# Patient Record
Sex: MALE | Race: WHITE | NOT HISPANIC OR LATINO | Employment: OTHER | URBAN - METROPOLITAN AREA
[De-identification: names, ages, dates, MRNs, and addresses within clinical notes are randomized per-mention and may not be internally consistent; named-entity substitution may affect disease eponyms.]

---

## 2017-09-25 RX ORDER — LOSARTAN POTASSIUM 50 MG/1
TABLET ORAL
Qty: 90 TABLET | Refills: 3 | Status: SHIPPED | OUTPATIENT
Start: 2017-09-25 | End: 2018-10-04 | Stop reason: SDUPTHER

## 2018-07-03 ENCOUNTER — TELEPHONE (OUTPATIENT)
Dept: INTERNAL MEDICINE | Facility: CLINIC | Age: 48
End: 2018-07-03

## 2018-07-03 NOTE — TELEPHONE ENCOUNTER
----- Message from Marianne Cardoza sent at 7/2/2018 11:06 AM CDT -----  Contact: self/[phone]  Pt would like to be reestablished with the doctor, the pt has not been seen by the doctor since 09/03/2015. Pt would like to speak with the nurse in regards to this matter.

## 2018-07-03 NOTE — TELEPHONE ENCOUNTER
----- Message from Asiya Anderson sent at 7/3/2018  3:47 PM CDT -----  Contact: Patient [phone]  Type: Orders Request    What orders/ testing are being requested? EPP    Is there a future appointment scheduled for the patient with PCP? Yes    When? 7/26/18

## 2018-07-03 NOTE — TELEPHONE ENCOUNTER
----- Message from Marianne Cardoza sent at 7/2/2018 11:06 AM CDT -----  Contact: self/[phone]  Pt would like to be reestablished with the doctor, the pt has not been seen by the doctor since 09/03/2015. Pt would like to speak with the nurse in regards to this matter.

## 2018-07-26 ENCOUNTER — OFFICE VISIT (OUTPATIENT)
Dept: INTERNAL MEDICINE | Facility: CLINIC | Age: 48
End: 2018-07-26
Payer: COMMERCIAL

## 2018-07-26 ENCOUNTER — HOSPITAL ENCOUNTER (OUTPATIENT)
Dept: RADIOLOGY | Facility: HOSPITAL | Age: 48
Discharge: HOME | End: 2018-07-26
Attending: INTERNAL MEDICINE
Payer: COMMERCIAL

## 2018-07-26 VITALS
OXYGEN SATURATION: 98 % | SYSTOLIC BLOOD PRESSURE: 126 MMHG | BODY MASS INDEX: 34.03 KG/M2 | DIASTOLIC BLOOD PRESSURE: 80 MMHG | HEIGHT: 74 IN | HEART RATE: 87 BPM | WEIGHT: 265.19 LBS

## 2018-07-26 DIAGNOSIS — D69.6 DECREASED PLATELET COUNT: ICD-10-CM

## 2018-07-26 DIAGNOSIS — I10 ESSENTIAL HYPERTENSION: ICD-10-CM

## 2018-07-26 DIAGNOSIS — Z00.00 PHYSICAL EXAM: Primary | ICD-10-CM

## 2018-07-26 DIAGNOSIS — E78.00 PURE HYPERCHOLESTEROLEMIA: ICD-10-CM

## 2018-07-26 PROCEDURE — 99999 PR PBB SHADOW E&M-EST. PATIENT-LVL IV: CPT | Mod: PBBFAC,,, | Performed by: INTERNAL MEDICINE

## 2018-07-26 PROCEDURE — 3079F DIAST BP 80-89 MM HG: CPT | Mod: CPTII,S$GLB,, | Performed by: INTERNAL MEDICINE

## 2018-07-26 PROCEDURE — 71046 X-RAY EXAM CHEST 2 VIEWS: CPT | Mod: TC

## 2018-07-26 PROCEDURE — 99396 PREV VISIT EST AGE 40-64: CPT | Mod: S$GLB,,, | Performed by: INTERNAL MEDICINE

## 2018-07-26 PROCEDURE — 71046 X-RAY EXAM CHEST 2 VIEWS: CPT | Mod: 26,,, | Performed by: RADIOLOGY

## 2018-07-26 PROCEDURE — 3074F SYST BP LT 130 MM HG: CPT | Mod: CPTII,S$GLB,, | Performed by: INTERNAL MEDICINE

## 2018-07-26 RX ORDER — CELECOXIB 200 MG/1
200 CAPSULE ORAL 2 TIMES DAILY
Status: ON HOLD | COMMUNITY
End: 2020-12-11 | Stop reason: HOSPADM

## 2018-07-26 NOTE — PROGRESS NOTES
"Subjective:      Patient ID: Jim Brown is a 48 y.o. male.    Chief Complaint: Annual Exam    HPI:  HPI   Patient is here for an annual exam: he feels well, he had an episode of fluid on the knee and had a left shoulder problem ( adhesive capsulitis). Dr. Chavez felt he may have had gout.    Rash on lower leg: dermatitis ( prednisone and ketoconazole) ( April to July) Over the body  Bone Spur and on celebrex 200 mg daily    Annual exam: 7/26/2018  Colonoscopy: Father with polyps and Paternal Grandmother with colon cancer.  Optho: Dr. Tatum  Flu: due in the fall  Tetanus: 2014  Shingles:not until age 49  Pneumovax  Prevnar:      Patient Active Problem List   Diagnosis    Hypertension    Foot pain: Dr. Simon    Hyperlipidemia    Decreased platelet count    Screening for colon cancer    BMI 34.0-34.9,adult     No past medical history on file.  Past Surgical History:   Procedure Laterality Date    COLONOSCOPY  8/10/15    TONSILLECTOMY       Family History   Problem Relation Age of Onset    Cancer Paternal Grandmother         colon cancer     Review of Systems   Constitutional: Negative for appetite change, chills, fever and unexpected weight change.   Respiratory: Negative for shortness of breath and wheezing.    Cardiovascular: Negative for chest pain, palpitations and leg swelling.   Gastrointestinal: Negative for abdominal pain, blood in stool, diarrhea, nausea and vomiting.     Objective:     Vitals:    07/26/18 1431   BP: 126/80   Pulse: 87   SpO2: 98%   Weight: 120.3 kg (265 lb 3.4 oz)   Height: 6' 2" (1.88 m)   PainSc: 0-No pain     Body mass index is 34.05 kg/m².  Physical Exam   Constitutional: He is oriented to person, place, and time. He appears well-developed and well-nourished. No distress.   Neck: Carotid bruit is not present. No thyromegaly present.   Cardiovascular: Normal rate, regular rhythm and normal heart sounds.  PMI is not displaced.    Pulmonary/Chest: Effort normal and " breath sounds normal. No respiratory distress.   Abdominal: Soft. Bowel sounds are normal. He exhibits no distension. There is no tenderness.   Musculoskeletal: He exhibits no edema.   Neurological: He is alert and oriented to person, place, and time.     Assessment:     1. Physical exam    2. Essential hypertension    3. Decreased platelet count    4. Pure hypercholesterolemia    5. BMI 34.0-34.9,adult      Plan:   Jim was seen today for annual exam.    Diagnoses and all orders for this visit:    Physical exam: updated and reviewed    Essential hypertension: well controlled on losartan 50  -     CBC auto differential; Future  -     Comprehensive metabolic panel; Future  -     Lipid panel; Future  -     TSH; Future  -     Hemoglobin A1c; Future  -     X-Ray Chest PA And Lateral; Future  -     EKG 12-lead; Future  -     Urinalysis  -     CT Cardiac Scoring; Future    Decreased platelet count: monitor yearly    Pure hypercholesterolemia: monitor    BMI 34.0-34.9,adult: for good health must lost weight      Follow-up in about 1 year (around 7/26/2019) for Annual exam.     Medication List          Accurate as of 7/26/18  3:10 PM. If you have any questions, ask your nurse or doctor.               CONTINUE taking these medications    celecoxib 200 MG capsule  Commonly known as:  CeleBREX     losartan 50 MG tablet  Commonly known as:  COZAAR  TAKE 1 TABLET BY MOUTH EVERY DAY        STOP taking these medications    meloxicam 7.5 MG tablet  Commonly known as:  MOBIC  Stopped by:  Cynthia Myers MD     triamcinolone 55 mcg nasal inhaler  Commonly known as:  NASACORT AQ  Stopped by:  Cynthia Myers MD

## 2018-08-14 ENCOUNTER — HOSPITAL ENCOUNTER (OUTPATIENT)
Dept: RADIOLOGY | Facility: HOSPITAL | Age: 48
Discharge: HOME | End: 2018-08-14
Attending: INTERNAL MEDICINE
Payer: COMMERCIAL

## 2018-08-14 ENCOUNTER — HOSPITAL ENCOUNTER (OUTPATIENT)
Dept: CARDIOLOGY | Facility: CLINIC | Age: 48
Discharge: HOME | End: 2018-08-14
Payer: COMMERCIAL

## 2018-08-14 DIAGNOSIS — I10 ESSENTIAL HYPERTENSION: ICD-10-CM

## 2018-08-14 PROCEDURE — 93000 ELECTROCARDIOGRAM COMPLETE: CPT | Mod: S$GLB,,, | Performed by: INTERNAL MEDICINE

## 2018-08-14 PROCEDURE — 75571 CT HRT W/O DYE W/CA TEST: CPT | Mod: 26,,, | Performed by: RADIOLOGY

## 2018-08-14 PROCEDURE — 75571 CT HRT W/O DYE W/CA TEST: CPT | Mod: TC

## 2018-10-04 RX ORDER — LOSARTAN POTASSIUM 50 MG/1
TABLET ORAL
Qty: 90 TABLET | Refills: 3 | Status: SHIPPED | OUTPATIENT
Start: 2018-10-04 | End: 2019-11-10 | Stop reason: SDUPTHER

## 2019-11-10 RX ORDER — LOSARTAN POTASSIUM 50 MG/1
TABLET ORAL
Qty: 90 TABLET | Refills: 0 | Status: SHIPPED | OUTPATIENT
Start: 2019-11-10 | End: 2020-03-18

## 2020-03-18 RX ORDER — LOSARTAN POTASSIUM 50 MG/1
TABLET ORAL
Qty: 90 TABLET | Refills: 3 | Status: ON HOLD | OUTPATIENT
Start: 2020-03-18 | End: 2020-12-11 | Stop reason: SDUPTHER

## 2020-12-06 ENCOUNTER — NURSE TRIAGE (OUTPATIENT)
Dept: ADMINISTRATIVE | Facility: CLINIC | Age: 50
End: 2020-12-06

## 2020-12-06 ENCOUNTER — PATIENT MESSAGE (OUTPATIENT)
Dept: ADMINISTRATIVE | Facility: OTHER | Age: 50
End: 2020-12-06

## 2020-12-06 ENCOUNTER — OFFICE VISIT (OUTPATIENT)
Dept: URGENT CARE | Facility: CLINIC | Age: 50
End: 2020-12-06
Payer: COMMERCIAL

## 2020-12-06 VITALS
BODY MASS INDEX: 34.01 KG/M2 | HEART RATE: 102 BPM | RESPIRATION RATE: 18 BRPM | TEMPERATURE: 99 F | HEIGHT: 74 IN | SYSTOLIC BLOOD PRESSURE: 143 MMHG | OXYGEN SATURATION: 97 % | WEIGHT: 265 LBS | DIASTOLIC BLOOD PRESSURE: 87 MMHG

## 2020-12-06 DIAGNOSIS — U07.1 COVID-19 VIRUS INFECTION: ICD-10-CM

## 2020-12-06 DIAGNOSIS — R19.7 DIARRHEA, UNSPECIFIED TYPE: ICD-10-CM

## 2020-12-06 DIAGNOSIS — R06.02 SHORTNESS OF BREATH: Primary | ICD-10-CM

## 2020-12-06 DIAGNOSIS — J18.9 PNEUMONIA OF BOTH LUNGS DUE TO INFECTIOUS ORGANISM, UNSPECIFIED PART OF LUNG: ICD-10-CM

## 2020-12-06 DIAGNOSIS — R11.0 NAUSEA: ICD-10-CM

## 2020-12-06 LAB
CTP QC/QA: YES
SARS-COV-2 RDRP RESP QL NAA+PROBE: POSITIVE

## 2020-12-06 PROCEDURE — 99214 PR OFFICE/OUTPT VISIT, EST, LEVL IV, 30-39 MIN: ICD-10-PCS | Mod: 25,S$GLB,, | Performed by: NURSE PRACTITIONER

## 2020-12-06 PROCEDURE — 71046 X-RAY EXAM CHEST 2 VIEWS: CPT | Mod: FY,S$GLB,, | Performed by: RADIOLOGY

## 2020-12-06 PROCEDURE — 71046 XR CHEST PA AND LATERAL: ICD-10-PCS | Mod: FY,S$GLB,, | Performed by: RADIOLOGY

## 2020-12-06 PROCEDURE — 96372 PR INJECTION,THERAP/PROPH/DIAG2ST, IM OR SUBCUT: ICD-10-PCS | Mod: S$GLB,,, | Performed by: NURSE PRACTITIONER

## 2020-12-06 PROCEDURE — 99214 OFFICE O/P EST MOD 30 MIN: CPT | Mod: 25,S$GLB,, | Performed by: NURSE PRACTITIONER

## 2020-12-06 PROCEDURE — 3008F BODY MASS INDEX DOCD: CPT | Mod: CPTII,S$GLB,, | Performed by: NURSE PRACTITIONER

## 2020-12-06 PROCEDURE — U0002 COVID-19 LAB TEST NON-CDC: HCPCS | Mod: QW,S$GLB,, | Performed by: NURSE PRACTITIONER

## 2020-12-06 PROCEDURE — 3008F PR BODY MASS INDEX (BMI) DOCUMENTED: ICD-10-PCS | Mod: CPTII,S$GLB,, | Performed by: NURSE PRACTITIONER

## 2020-12-06 PROCEDURE — 96372 THER/PROPH/DIAG INJ SC/IM: CPT | Mod: S$GLB,,, | Performed by: NURSE PRACTITIONER

## 2020-12-06 PROCEDURE — U0002: ICD-10-PCS | Mod: QW,S$GLB,, | Performed by: NURSE PRACTITIONER

## 2020-12-06 RX ORDER — ALBUTEROL SULFATE 90 UG/1
2 AEROSOL, METERED RESPIRATORY (INHALATION) EVERY 6 HOURS PRN
Qty: 18 G | Refills: 0 | Status: SHIPPED | OUTPATIENT
Start: 2020-12-06 | End: 2022-01-25

## 2020-12-06 RX ORDER — ONDANSETRON 4 MG/1
4 TABLET, FILM COATED ORAL DAILY PRN
Qty: 30 TABLET | Refills: 0 | Status: SHIPPED | OUTPATIENT
Start: 2020-12-06 | End: 2021-12-06

## 2020-12-06 RX ORDER — LIDOCAINE HYDROCHLORIDE 10 MG/ML
1 INJECTION INFILTRATION; PERINEURAL
Status: DISCONTINUED | OUTPATIENT
Start: 2020-12-06 | End: 2020-12-06 | Stop reason: HOSPADM

## 2020-12-06 RX ORDER — DOXYCYCLINE HYCLATE 100 MG
100 TABLET ORAL 2 TIMES DAILY
Qty: 14 TABLET | Refills: 0 | Status: ON HOLD | OUTPATIENT
Start: 2020-12-06 | End: 2020-12-11 | Stop reason: HOSPADM

## 2020-12-06 RX ORDER — CEFTRIAXONE 1 G/1
1 INJECTION, POWDER, FOR SOLUTION INTRAMUSCULAR; INTRAVENOUS
Status: DISCONTINUED | OUTPATIENT
Start: 2020-12-06 | End: 2020-12-06 | Stop reason: HOSPADM

## 2020-12-06 RX ADMIN — CEFTRIAXONE 1 G: 1 INJECTION, POWDER, FOR SOLUTION INTRAMUSCULAR; INTRAVENOUS at 03:12

## 2020-12-06 RX ADMIN — LIDOCAINE HYDROCHLORIDE 1 ML: 10 INJECTION INFILTRATION; PERINEURAL at 04:12

## 2020-12-06 NOTE — PATIENT INSTRUCTIONS
When You Have Pneumonia  You have been diagnosed with pneumonia. This is a serious lung infection. Most cases of pneumonia are caused by bacteria. Pneumonia most often occurs in older adults, young children, and people with chronic health problems.  Home care  · Take your medicine exactly as directed. Dont skip doses. Continue taking your antibiotics as until they are all gone, even if you start to feel better. This will prevent the pneumonia from coming back.  · Drink at least 8 glasses of water daily, unless directed otherwise. This helps to loosen and thin secretions so that you can cough them up.  · Use a cool-mist humidifier in your bedroom. Be sure to clean the humidifier daily.  · Dont use medicines to suppress your cough unless your cough is dry, painful, or interferes with your sleep. Coughing up mucus is normal. You may use an expectorant if your healthcare provider says its okay.  · You can use warm compresses or a heating pad on the lowest setting to relieve chest discomfort. Use several times a day for 15-20 minutes at a time. To prevent injury to your skin, set the temperature to warm, not hot. Dont put the compress or pad directly on your skin. Make certain it has a cover or wrap it in a towel. This is to prevent skin burns.  · Get plenty of rest until your fever, shortness of breath, and chest pain go away.  · Plan to get a flu shot every year. The flu is a common cause of pneumonia. Getting a flu shot every year can help prevent both the flu and pneumonia.  Getting the pneumococcal vaccine  Talk with your healthcare provider about getting the pneumococcalvaccine. Pneumococcal pneumonia is caused by bacteria that spread from person to person. It can cause minor problems, such as ear infections. But it can also turn into life-threatening illnesses of the lungs (pneumonia), the covering of the brain and spinal cord (meningitis), and the blood (bacteremia).  Children under 2 years of age, adults  over age 65, people with certain health conditions, and smokers are at the highest risk of pneumococcal disease. This vaccine can help prevent pneumococcal disease in both adults and children. Some people should not have the vaccine. Make sure to ask your healthcare provider if you should have the vaccine.   Follow-up care  Make a follow-up appointment as directed by our staff.  When to call your healthcare provider  Call your healthcare provider if you have any of the following:  · Fever above 100.4°F (38°C), or as directed by your healthcare provider  · Mucus from the lungs (sputum) thats yellow, green, bloody, or smells bad  · A large amount of sputum  · Vomiting  · Symptoms that get worse  When to call 911  Call 911 right away if you have any of the following:  · Chest pain  · Trouble breathing  · Blue lips or fingernails   Date Last Reviewed: 11/1/2016  © 0405-7097 [employer]. [address] [place] [address]. All rights reserved. This information is not intended as a substitute for professional medical care. Always follow your healthcare professional's instructions.        Your test was POSITIVE for COVID-19 (coronavirus).       Please isolate yourself at home.  You may leave home and/or return to work once the following conditions are met:    If you were not hospitalized and are not severely immunocompromised*:   More than 10 days since symptoms first appeared AND   More than 24 hours fever free without medications AND   Symptoms have improved     If you were hospitalized OR are severely immunocompromised*:   More than 20 days since symptoms first appeared   More than 24 hours fever free without medications   Symptoms have improved    If you had no symptoms but tested positive:   More than 10 days since the date of the first positive test (20 days if severely immunocompromised).   If you develop symptoms, then use the guidelines above.     *Definition of severely  immunocompromised:  - Current chemotherapy for cancer  - Untreated HIV with CD4 count less than 200  - Combined primary immunodeficiency disorder  - Prednisone more than 20 mg per day for more than 14 days  - Post-transplant patients    Additional instructions:   Separate yourself from other people and animals in your home.   Call ahead before visiting your doctor.   Wear a facemask when around others.   Cover your coughs and sneezes.   Wash your hands often with soap and water; hand  can be used, too.   Avoid sharing personal household items.   Wipe down surfaces used daily.   Monitor your symptoms. Seek prompt medical attention if your illness is worsening (e.g., difficulty breathing).    Before seeking care, call your healthcare provider.   If you have a medical emergency and need to call 911, notify the dispatch personnel that you have, or are being evaluated for COVID-19. If possible, put on a facemask before emergency medical services arrive.        Contact Tracing    As one of the next steps, you will receive a call or text from the [place] [facility] ([facility]) COVID-19 Tracing Team. See the contact information below so you know not to ignore the health departments call. It is important that you contact them back immediately so they can help.      Contact Tracer Number:  [phone]  Caller ID for most carriers: [place] Dept [facility]     What is contact tracing?  · Contact tracing is a process that helps identify everyone who has been in close contact with an infected person. Contact tracers let those people know they may have been exposed and guide them on next steps. Confidentiality is important for everyone; no one will be told who may have exposed them to the virus.  · Your involvement is important. The more we know about where and how this virus is spreading, the better chance we have at stopping it from spreading further.  What does exposure mean?  · Exposure means you have been  within 6 feet for more than 15 minutes with a person who has or had COVID-19.  What kind of questions do the contact tracers ask?  · A contact tracer will confirm your basic contact information including name, address, phone number, and next of kin, as well as asking about any symptoms you may have had. Theyll also ask you how you think you may have gotten sick, such as places where you may have been exposed to the virus, and people you were with. Those names will never be shared with anyone outside of that call, and will only be used to help trace and stop the spread of the virus.   I have privacy concerns. How will the state use my information?  · Your privacy about your health is important. All calls are completed using call centers that use the appropriate health privacy protection measures (HIPAA compliance), meaning that your patient information is safe. No one will ever ask you any questions related to immigration status. Your health comes first.   Do I have to participate?  · You do not have to participate, but we strongly encourage you to. Contact tracing can help us catch and control new outbreaks as theyre developing to keep your friends and family safe.   What if I dont hear from anyone?  · If you dont receive a call within 24 hours, you can call the number above right away to inquire about your status. That line is open from 8:00 am - 8:00 p.m., 7 days a week.  Contact tracing saves lives! Together, we have the power to beat this virus and keep our loved ones and neighbors safe.    For more information see CDC link below.      https://www.cdc.gov/coronavirus/2019-ncov/hcp/guidance-prevent-spread.html#precautions        Sources:  [facility], [place] [facility] and \[facility]\""           Sincerely,     Harshad Puckett III, NP

## 2020-12-06 NOTE — PROGRESS NOTES
"Subjective:       Patient ID: Jim Brown is a 50 y.o. male.    Vitals:  height is 6' 2" (1.88 m) and weight is 120.2 kg (265 lb). His oral temperature is 99.2 °F (37.3 °C). His blood pressure is 143/87 (abnormal) and his pulse is 102. His respiration is 18 and oxygen saturation is 97%.     Chief Complaint: URI    This past tested positive for COVID-19.  Patient began having symptoms yesterday and shortness of breath has worsened.  He has taken his pulse ox at home and record results from 89% to 96%.    Cough has been intermittent and dry.    Patient with chills and subjective fever.  No nausea vomiting or diarrhea.  No sudden lack of sense of taste or smell.    URI   This is a new problem. The current episode started in the past 7 days (WEDNESDAY ). The problem has been gradually worsening. The maximum temperature recorded prior to his arrival was 101 - 101.9 F (TODAY ). Associated symptoms include congestion, coughing, diarrhea, headaches, nausea and wheezing. Pertinent negatives include no ear pain, rash, sinus pain, sore throat or vomiting. He has tried acetaminophen (THERAFLU, VITAMIN C, VITAMIN D, AND MULTIIVITAMIN ) for the symptoms. The treatment provided no relief.       Constitution: Positive for chills, sweating, fatigue and fever.   HENT: Positive for congestion. Negative for ear pain, sinus pain, sinus pressure, sore throat and voice change.    Neck: Negative for painful lymph nodes.   Eyes: Negative for eye redness.   Respiratory: Positive for chest tightness, cough, shortness of breath and wheezing. Negative for sputum production, bloody sputum, COPD, stridor and asthma.    Gastrointestinal: Positive for nausea and diarrhea. Negative for vomiting.   Musculoskeletal: Positive for muscle ache.   Skin: Negative for rash.   Allergic/Immunologic: Negative for seasonal allergies and asthma.   Neurological: Positive for headaches.   Hematologic/Lymphatic: Negative for swollen lymph nodes.     "   Objective:      Physical Exam   Constitutional: He is oriented to person, place, and time. He appears well-developed. He is cooperative.  Non-toxic appearance. He appears ill. No distress.   HENT:   Head: Normocephalic and atraumatic.   Ears:   Right Ear: Hearing and external ear normal.   Left Ear: Hearing and external ear normal.   Nose: Nose normal. No mucosal edema, rhinorrhea or nasal deformity. No epistaxis.   Mouth/Throat: Mucous membranes are normal.   Eyes: Conjunctivae and lids are normal. No scleral icterus.   Neck: Trachea normal, full passive range of motion without pain and phonation normal. Neck supple. No neck rigidity. No edema and no erythema present.   Cardiovascular: Normal rate.   Pulmonary/Chest: Effort normal and breath sounds normal. No respiratory distress. He has no decreased breath sounds. He has no wheezes.   Abdominal: Normal appearance.   Musculoskeletal: Normal range of motion.         General: No deformity.   Neurological: He is alert and oriented to person, place, and time. He exhibits normal muscle tone. Coordination normal.   Skin: Skin is warm, dry, intact, not diaphoretic and not pale. Psychiatric: His speech is normal and behavior is normal. Judgment and thought content normal.   Nursing note and vitals reviewed.    X-ray Chest Pa And Lateral    Result Date: 12/6/2020  EXAMINATION: XR CHEST PA AND LATERAL CLINICAL HISTORY: Shortness of breath TECHNIQUE: PA and lateral views of the chest were performed. COMPARISON: 07/26/2018 FINDINGS: Bilateral patchy airspace opacities are seen suspicious for pneumonia.  Follow-up to resolution is recommended.  Cardiomediastinal silhouette and osseous structures are unchanged.     As above Electronically signed by: Santiago Anderson MD Date:    12/06/2020 Time:    15:34    Results for orders placed or performed in visit on 12/06/20   POCT COVID-19 Rapid Screening   Result Value Ref Range    POC Rapid COVID Positive (A) Negative      Acceptable Yes            Assessment:       1. Shortness of breath    2. COVID-19 virus infection    3. Diarrhea, unspecified type    4. Nausea    5. Pneumonia of both lungs due to infectious organism, unspecified part of lung        Plan:       Labs and Xrays ordered at this visit reviewed.     Shortness of breath  -     POCT COVID-19 Rapid Screening  -     X-Ray Chest PA And Lateral; Future; Expected date: 12/06/2020  -     COVID-19 Surveillance Program  -     pulse oximeter (PULSE OXIMETER) device; by Apply Externally route 2 (two) times a day. Use twice daily at 8 AM and 3 PM and record the value in MyChart as directed.  Dispense: 1 each; Refill: 0  -     NURSING COMMUNICATION: Create [facility]  -     albuterol (VENTOLIN HFA) 90 mcg/actuation inhaler; Inhale 2 puffs into the lungs every 6 (six) hours as needed for Wheezing. Rescue  Dispense: 18 g; Refill: 0    COVID-19 virus infection  -     COVID-19 Surveillance Program  -     pulse oximeter (PULSE OXIMETER) device; by Apply Externally route 2 (two) times a day. Use twice daily at 8 AM and 3 PM and record the value in MyChart as directed.  Dispense: 1 each; Refill: 0  -     NURSING COMMUNICATION: Create [facility]  -     albuterol (VENTOLIN HFA) 90 mcg/actuation inhaler; Inhale 2 puffs into the lungs every 6 (six) hours as needed for Wheezing. Rescue  Dispense: 18 g; Refill: 0    Diarrhea, unspecified type  -     X-Ray Chest PA And Lateral; Future; Expected date: 12/06/2020    Nausea  -     ondansetron (ZOFRAN) 4 MG tablet; Take 1 tablet (4 mg total) by mouth daily as needed for Nausea.  Dispense: 30 tablet; Refill: 0    Pneumonia of both lungs due to infectious organism, unspecified part of lung    Other orders  -     cefTRIAXone injection 1 g  -     lidocaine HCL 10 mg/ml (1%) injection 1 mL  -     doxycycline (VIBRA-TABS) 100 MG tablet; Take 1 tablet (100 mg total) by mouth 2 (two) times daily. for 7 days  Dispense: 14 tablet; Refill:  0      Patient Instructions       When You Have Pneumonia  You have been diagnosed with pneumonia. This is a serious lung infection. Most cases of pneumonia are caused by bacteria. Pneumonia most often occurs in older adults, young children, and people with chronic health problems.  Home care  · Take your medicine exactly as directed. Dont skip doses. Continue taking your antibiotics as until they are all gone, even if you start to feel better. This will prevent the pneumonia from coming back.  · Drink at least 8 glasses of water daily, unless directed otherwise. This helps to loosen and thin secretions so that you can cough them up.  · Use a cool-mist humidifier in your bedroom. Be sure to clean the humidifier daily.  · Dont use medicines to suppress your cough unless your cough is dry, painful, or interferes with your sleep. Coughing up mucus is normal. You may use an expectorant if your healthcare provider says its okay.  · You can use warm compresses or a heating pad on the lowest setting to relieve chest discomfort. Use several times a day for 15-20 minutes at a time. To prevent injury to your skin, set the temperature to warm, not hot. Dont put the compress or pad directly on your skin. Make certain it has a cover or wrap it in a towel. This is to prevent skin burns.  · Get plenty of rest until your fever, shortness of breath, and chest pain go away.  · Plan to get a flu shot every year. The flu is a common cause of pneumonia. Getting a flu shot every year can help prevent both the flu and pneumonia.  Getting the pneumococcal vaccine  Talk with your healthcare provider about getting the pneumococcalvaccine. Pneumococcal pneumonia is caused by bacteria that spread from person to person. It can cause minor problems, such as ear infections. But it can also turn into life-threatening illnesses of the lungs (pneumonia), the covering of the brain and spinal cord (meningitis), and the blood  (bacteremia).  Children under 2 years of age, adults over age 65, people with certain health conditions, and smokers are at the highest risk of pneumococcal disease. This vaccine can help prevent pneumococcal disease in both adults and children. Some people should not have the vaccine. Make sure to ask your healthcare provider if you should have the vaccine.   Follow-up care  Make a follow-up appointment as directed by our staff.  When to call your healthcare provider  Call your healthcare provider if you have any of the following:  · Fever above 100.4°F (38°C), or as directed by your healthcare provider  · Mucus from the lungs (sputum) thats yellow, green, bloody, or smells bad  · A large amount of sputum  · Vomiting  · Symptoms that get worse  When to call 911  Call 911 right away if you have any of the following:  · Chest pain  · Trouble breathing  · Blue lips or fingernails   Date Last Reviewed: 11/1/2016 © 2000-2017 [employer] [address] [place] [address]. All rights reserved. This information is not intended as a substitute for professional medical care. Always follow your healthcare professional's instructions.        Your test was POSITIVE for COVID-19 (coronavirus).       Please isolate yourself at home.  You may leave home and/or return to work once the following conditions are met:    If you were not hospitalized and are not severely immunocompromised*:   More than 10 days since symptoms first appeared AND   More than 24 hours fever free without medications AND   Symptoms have improved     If you were hospitalized OR are severely immunocompromised*:   More than 20 days since symptoms first appeared   More than 24 hours fever free without medications   Symptoms have improved    If you had no symptoms but tested positive:   More than 10 days since the date of the first positive test (20 days if severely immunocompromised).   If you develop symptoms, then use the  guidelines above.     *Definition of severely immunocompromised:  - Current chemotherapy for cancer  - Untreated HIV with CD4 count less than 200  - Combined primary immunodeficiency disorder  - Prednisone more than 20 mg per day for more than 14 days  - Post-transplant patients    Additional instructions:   Separate yourself from other people and animals in your home.   Call ahead before visiting your doctor.   Wear a facemask when around others.   Cover your coughs and sneezes.   Wash your hands often with soap and water; hand  can be used, too.   Avoid sharing personal household items.   Wipe down surfaces used daily.   Monitor your symptoms. Seek prompt medical attention if your illness is worsening (e.g., difficulty breathing).    Before seeking care, call your healthcare provider.   If you have a medical emergency and need to call 911, notify the dispatch personnel that you have, or are being evaluated for COVID-19. If possible, put on a facemask before emergency medical services arrive.        Contact Tracing    As one of the next steps, you will receive a call or text from the [place] [facility] ([facility]) COVID-19 Tracing Team. See the contact information below so you know not to ignore the health departments call. It is important that you contact them back immediately so they can help.      Contact Tracer Number:  [phone]  Caller ID for most carriers: [place] Dept [facility]     What is contact tracing?  · Contact tracing is a process that helps identify everyone who has been in close contact with an infected person. Contact tracers let those people know they may have been exposed and guide them on next steps. Confidentiality is important for everyone; no one will be told who may have exposed them to the virus.  · Your involvement is important. The more we know about where and how this virus is spreading, the better chance we have at stopping it from spreading further.  What does  exposure mean?  · Exposure means you have been within 6 feet for more than 15 minutes with a person who has or had COVID-19.  What kind of questions do the contact tracers ask?  · A contact tracer will confirm your basic contact information including name, address, phone number, and next of kin, as well as asking about any symptoms you may have had. Theyll also ask you how you think you may have gotten sick, such as places where you may have been exposed to the virus, and people you were with. Those names will never be shared with anyone outside of that call, and will only be used to help trace and stop the spread of the virus.   I have privacy concerns. How will the state use my information?  · Your privacy about your health is important. All calls are completed using call centers that use the appropriate health privacy protection measures (HIPAA compliance), meaning that your patient information is safe. No one will ever ask you any questions related to immigration status. Your health comes first.   Do I have to participate?  · You do not have to participate, but we strongly encourage you to. Contact tracing can help us catch and control new outbreaks as theyre developing to keep your friends and family safe.   What if I dont hear from anyone?  · If you dont receive a call within 24 hours, you can call the number above right away to inquire about your status. That line is open from 8:00 am - 8:00 p.m., 7 days a week.  Contact tracing saves lives! Together, we have the power to beat this virus and keep our loved ones and neighbors safe.    For more information see CDC link below.      https://www.cdc.gov/coronavirus/2019-ncov/hcp/guidance-prevent-spread.html#precautions        Sources:  [facility], [place] [facility] and [facility]           Sincerely,     Harshad Puckett III, NP

## 2020-12-07 ENCOUNTER — NURSE TRIAGE (OUTPATIENT)
Dept: ADMINISTRATIVE | Facility: CLINIC | Age: 50
End: 2020-12-07

## 2020-12-07 ENCOUNTER — HOSPITAL ENCOUNTER (INPATIENT)
Facility: HOSPITAL | Age: 50
LOS: 4 days | Discharge: HOME | DRG: 177 | End: 2020-12-11
Attending: EMERGENCY MEDICINE | Admitting: INTERNAL MEDICINE
Payer: COMMERCIAL

## 2020-12-07 DIAGNOSIS — U07.1 PNEUMONIA DUE TO COVID-19 VIRUS: Primary | ICD-10-CM

## 2020-12-07 DIAGNOSIS — R09.02 HYPOXIA: ICD-10-CM

## 2020-12-07 DIAGNOSIS — Z20.822 SUSPECTED COVID-19 VIRUS INFECTION: ICD-10-CM

## 2020-12-07 DIAGNOSIS — J12.82 PNEUMONIA DUE TO COVID-19 VIRUS: Primary | ICD-10-CM

## 2020-12-07 LAB
25(OH)D3+25(OH)D2 SERPL-MCNC: 49 NG/ML (ref 30–96)
ALBUMIN SERPL BCP-MCNC: 3.4 G/DL (ref 3.5–5.2)
ALP SERPL-CCNC: 38 U/L (ref 55–135)
ALT SERPL W/O P-5'-P-CCNC: 38 U/L (ref 10–44)
ANION GAP SERPL CALC-SCNC: 12 MMOL/L (ref 8–16)
AST SERPL-CCNC: 37 U/L (ref 10–40)
BASOPHILS # BLD AUTO: 0.01 K/UL (ref 0–0.2)
BASOPHILS NFR BLD: 0.2 % (ref 0–1.9)
BILIRUB SERPL-MCNC: 0.6 MG/DL (ref 0.1–1)
BNP SERPL-MCNC: 14 PG/ML (ref 0–99)
BNP SERPL-MCNC: 14 PG/ML (ref 0–99)
BUN SERPL-MCNC: 11 MG/DL (ref 6–20)
CALCIUM SERPL-MCNC: 8.5 MG/DL (ref 8.7–10.5)
CHLORIDE SERPL-SCNC: 97 MMOL/L (ref 95–110)
CK SERPL-CCNC: 573 U/L (ref 20–200)
CO2 SERPL-SCNC: 25 MMOL/L (ref 23–29)
CREAT SERPL-MCNC: 1 MG/DL (ref 0.5–1.4)
CRP SERPL-MCNC: 141.6 MG/L (ref 0–8.2)
D DIMER PPP IA.FEU-MCNC: 0.26 MG/L FEU
D DIMER PPP IA.FEU-MCNC: 0.26 MG/L FEU
DIFFERENTIAL METHOD: ABNORMAL
EOSINOPHIL # BLD AUTO: 0 K/UL (ref 0–0.5)
EOSINOPHIL NFR BLD: 0 % (ref 0–8)
ERYTHROCYTE [DISTWIDTH] IN BLOOD BY AUTOMATED COUNT: 12 % (ref 11.5–14.5)
ERYTHROCYTE [SEDIMENTATION RATE] IN BLOOD BY WESTERGREN METHOD: 40 MM/HR (ref 0–23)
EST. GFR  (AFRICAN AMERICAN): >60 ML/MIN/1.73 M^2
EST. GFR  (NON AFRICAN AMERICAN): >60 ML/MIN/1.73 M^2
FERRITIN SERPL-MCNC: 1667 NG/ML (ref 20–300)
GLUCOSE SERPL-MCNC: 120 MG/DL (ref 70–110)
HCT VFR BLD AUTO: 39.4 % (ref 40–54)
HGB BLD-MCNC: 12.8 G/DL (ref 14–18)
IMM GRANULOCYTES # BLD AUTO: 0.01 K/UL (ref 0–0.04)
IMM GRANULOCYTES NFR BLD AUTO: 0.2 % (ref 0–0.5)
LACTATE SERPL-SCNC: 1.4 MMOL/L (ref 0.5–2.2)
LDH SERPL L TO P-CCNC: 417 U/L (ref 110–260)
LYMPHOCYTES # BLD AUTO: 0.5 K/UL (ref 1–4.8)
LYMPHOCYTES NFR BLD: 12.2 % (ref 18–48)
MCH RBC QN AUTO: 30.7 PG (ref 27–31)
MCHC RBC AUTO-ENTMCNC: 32.5 G/DL (ref 32–36)
MCV RBC AUTO: 95 FL (ref 82–98)
MONOCYTES # BLD AUTO: 0.2 K/UL (ref 0.3–1)
MONOCYTES NFR BLD: 4.4 % (ref 4–15)
NEUTROPHILS # BLD AUTO: 3.4 K/UL (ref 1.8–7.7)
NEUTROPHILS NFR BLD: 83 % (ref 38–73)
NRBC BLD-RTO: 0 /100 WBC
PLATELET # BLD AUTO: 158 K/UL (ref 150–350)
PMV BLD AUTO: 10.2 FL (ref 9.2–12.9)
POTASSIUM SERPL-SCNC: 3.7 MMOL/L (ref 3.5–5.1)
PROCALCITONIN SERPL IA-MCNC: 0.09 NG/ML
PROCALCITONIN SERPL IA-MCNC: 0.09 NG/ML
PROT SERPL-MCNC: 7.2 G/DL (ref 6–8.4)
RBC # BLD AUTO: 4.17 M/UL (ref 4.6–6.2)
SODIUM SERPL-SCNC: 134 MMOL/L (ref 136–145)
TROPONIN I SERPL DL<=0.01 NG/ML-MCNC: <0.006 NG/ML (ref 0–0.03)
WBC # BLD AUTO: 4.11 K/UL (ref 3.9–12.7)

## 2020-12-07 PROCEDURE — 83036 HEMOGLOBIN GLYCOSYLATED A1C: CPT

## 2020-12-07 PROCEDURE — 80053 COMPREHEN METABOLIC PANEL: CPT

## 2020-12-07 PROCEDURE — 85379 FIBRIN DEGRADATION QUANT: CPT

## 2020-12-07 PROCEDURE — 93005 ELECTROCARDIOGRAM TRACING: CPT

## 2020-12-07 PROCEDURE — 84484 ASSAY OF TROPONIN QUANT: CPT

## 2020-12-07 PROCEDURE — 99285 EMERGENCY DEPT VISIT HI MDM: CPT | Mod: 25

## 2020-12-07 PROCEDURE — 85652 RBC SED RATE AUTOMATED: CPT

## 2020-12-07 PROCEDURE — 99223 PR INITIAL HOSPITAL CARE,LEVL III: ICD-10-PCS | Mod: ,,, | Performed by: INTERNAL MEDICINE

## 2020-12-07 PROCEDURE — 87040 BLOOD CULTURE FOR BACTERIA: CPT

## 2020-12-07 PROCEDURE — 94640 AIRWAY INHALATION TREATMENT: CPT

## 2020-12-07 PROCEDURE — 83615 LACTATE (LD) (LDH) ENZYME: CPT

## 2020-12-07 PROCEDURE — 25000003 PHARM REV CODE 250: Performed by: INTERNAL MEDICINE

## 2020-12-07 PROCEDURE — 93010 ELECTROCARDIOGRAM REPORT: CPT | Mod: ,,, | Performed by: INTERNAL MEDICINE

## 2020-12-07 PROCEDURE — 11000001 HC ACUTE MED/SURG PRIVATE ROOM

## 2020-12-07 PROCEDURE — 63600175 PHARM REV CODE 636 W HCPCS: Performed by: INTERNAL MEDICINE

## 2020-12-07 PROCEDURE — 93010 EKG 12-LEAD: ICD-10-PCS | Mod: ,,, | Performed by: INTERNAL MEDICINE

## 2020-12-07 PROCEDURE — 99285 PR EMERGENCY DEPT VISIT,LEVEL V: ICD-10-PCS | Mod: ,,, | Performed by: EMERGENCY MEDICINE

## 2020-12-07 PROCEDURE — 99285 EMERGENCY DEPT VISIT HI MDM: CPT | Mod: ,,, | Performed by: EMERGENCY MEDICINE

## 2020-12-07 PROCEDURE — 82728 ASSAY OF FERRITIN: CPT

## 2020-12-07 PROCEDURE — 82550 ASSAY OF CK (CPK): CPT

## 2020-12-07 PROCEDURE — 83880 ASSAY OF NATRIURETIC PEPTIDE: CPT

## 2020-12-07 PROCEDURE — 25000242 PHARM REV CODE 250 ALT 637 W/ HCPCS: Performed by: INTERNAL MEDICINE

## 2020-12-07 PROCEDURE — 94761 N-INVAS EAR/PLS OXIMETRY MLT: CPT

## 2020-12-07 PROCEDURE — 84145 PROCALCITONIN (PCT): CPT

## 2020-12-07 PROCEDURE — 83605 ASSAY OF LACTIC ACID: CPT

## 2020-12-07 PROCEDURE — 63600175 PHARM REV CODE 636 W HCPCS: Performed by: EMERGENCY MEDICINE

## 2020-12-07 PROCEDURE — 99223 1ST HOSP IP/OBS HIGH 75: CPT | Mod: ,,, | Performed by: INTERNAL MEDICINE

## 2020-12-07 PROCEDURE — 86140 C-REACTIVE PROTEIN: CPT

## 2020-12-07 PROCEDURE — 63700000 PHARM REV CODE 250 ALT 637 W/O HCPCS: Performed by: INTERNAL MEDICINE

## 2020-12-07 PROCEDURE — 82306 VITAMIN D 25 HYDROXY: CPT

## 2020-12-07 PROCEDURE — 85025 COMPLETE CBC W/AUTO DIFF WBC: CPT

## 2020-12-07 PROCEDURE — 96374 THER/PROPH/DIAG INJ IV PUSH: CPT

## 2020-12-07 RX ORDER — IBUPROFEN 200 MG
24 TABLET ORAL
Status: DISCONTINUED | OUTPATIENT
Start: 2020-12-07 | End: 2020-12-11 | Stop reason: HOSPADM

## 2020-12-07 RX ORDER — ALBUTEROL SULFATE 90 UG/1
2 AEROSOL, METERED RESPIRATORY (INHALATION) EVERY 6 HOURS
Status: DISCONTINUED | OUTPATIENT
Start: 2020-12-07 | End: 2020-12-11 | Stop reason: HOSPADM

## 2020-12-07 RX ORDER — DEXAMETHASONE SODIUM PHOSPHATE 4 MG/ML
6 INJECTION, SOLUTION INTRA-ARTICULAR; INTRALESIONAL; INTRAMUSCULAR; INTRAVENOUS; SOFT TISSUE
Status: COMPLETED | OUTPATIENT
Start: 2020-12-07 | End: 2020-12-07

## 2020-12-07 RX ORDER — BENZONATATE 100 MG/1
200 CAPSULE ORAL 3 TIMES DAILY PRN
Status: DISCONTINUED | OUTPATIENT
Start: 2020-12-07 | End: 2020-12-11 | Stop reason: HOSPADM

## 2020-12-07 RX ORDER — MUPIROCIN 20 MG/G
OINTMENT TOPICAL 2 TIMES DAILY
Status: DISCONTINUED | OUTPATIENT
Start: 2020-12-07 | End: 2020-12-11 | Stop reason: HOSPADM

## 2020-12-07 RX ORDER — CHOLECALCIFEROL (VITAMIN D3) 25 MCG
1000 TABLET ORAL DAILY
Status: DISCONTINUED | OUTPATIENT
Start: 2020-12-08 | End: 2020-12-07

## 2020-12-07 RX ORDER — ASCORBIC ACID 500 MG
500 TABLET ORAL 2 TIMES DAILY
Status: DISCONTINUED | OUTPATIENT
Start: 2020-12-07 | End: 2020-12-11 | Stop reason: HOSPADM

## 2020-12-07 RX ORDER — TALC
6 POWDER (GRAM) TOPICAL NIGHTLY PRN
Status: DISCONTINUED | OUTPATIENT
Start: 2020-12-07 | End: 2020-12-11 | Stop reason: HOSPADM

## 2020-12-07 RX ORDER — ACETAMINOPHEN 325 MG/1
650 TABLET ORAL EVERY 6 HOURS PRN
Status: DISCONTINUED | OUTPATIENT
Start: 2020-12-07 | End: 2020-12-11 | Stop reason: HOSPADM

## 2020-12-07 RX ORDER — GUAIFENESIN/DEXTROMETHORPHAN 100-10MG/5
10 SYRUP ORAL EVERY 4 HOURS PRN
Status: DISCONTINUED | OUTPATIENT
Start: 2020-12-07 | End: 2020-12-11 | Stop reason: HOSPADM

## 2020-12-07 RX ORDER — CEFTRIAXONE 1 G/1
1 INJECTION, POWDER, FOR SOLUTION INTRAMUSCULAR; INTRAVENOUS
Status: COMPLETED | OUTPATIENT
Start: 2020-12-07 | End: 2020-12-10

## 2020-12-07 RX ORDER — ONDANSETRON 8 MG/1
8 TABLET, ORALLY DISINTEGRATING ORAL EVERY 8 HOURS PRN
Status: DISCONTINUED | OUTPATIENT
Start: 2020-12-07 | End: 2020-12-11 | Stop reason: HOSPADM

## 2020-12-07 RX ORDER — LOPERAMIDE HYDROCHLORIDE 2 MG/1
2 CAPSULE ORAL EVERY 6 HOURS PRN
Status: DISCONTINUED | OUTPATIENT
Start: 2020-12-07 | End: 2020-12-11 | Stop reason: HOSPADM

## 2020-12-07 RX ORDER — SODIUM CHLORIDE 0.9 % (FLUSH) 0.9 %
10 SYRINGE (ML) INJECTION
Status: DISCONTINUED | OUTPATIENT
Start: 2020-12-07 | End: 2020-12-11 | Stop reason: HOSPADM

## 2020-12-07 RX ORDER — GLUCAGON 1 MG
1 KIT INJECTION
Status: DISCONTINUED | OUTPATIENT
Start: 2020-12-07 | End: 2020-12-11 | Stop reason: HOSPADM

## 2020-12-07 RX ORDER — ENOXAPARIN SODIUM 100 MG/ML
40 INJECTION SUBCUTANEOUS EVERY 24 HOURS
Status: DISCONTINUED | OUTPATIENT
Start: 2020-12-07 | End: 2020-12-11 | Stop reason: HOSPADM

## 2020-12-07 RX ORDER — IBUPROFEN 200 MG
16 TABLET ORAL
Status: DISCONTINUED | OUTPATIENT
Start: 2020-12-07 | End: 2020-12-11 | Stop reason: HOSPADM

## 2020-12-07 RX ORDER — AZITHROMYCIN 250 MG/1
500 TABLET, FILM COATED ORAL
Status: COMPLETED | OUTPATIENT
Start: 2020-12-07 | End: 2020-12-09

## 2020-12-07 RX ORDER — PROMETHAZINE HYDROCHLORIDE 12.5 MG/1
25 TABLET ORAL EVERY 6 HOURS PRN
Status: DISCONTINUED | OUTPATIENT
Start: 2020-12-07 | End: 2020-12-11 | Stop reason: HOSPADM

## 2020-12-07 RX ADMIN — MUPIROCIN: 20 OINTMENT TOPICAL at 08:12

## 2020-12-07 RX ADMIN — AZITHROMYCIN MONOHYDRATE 500 MG: 250 TABLET ORAL at 04:12

## 2020-12-07 RX ADMIN — OXYCODONE HYDROCHLORIDE AND ACETAMINOPHEN 500 MG: 500 TABLET ORAL at 08:12

## 2020-12-07 RX ADMIN — REMDESIVIR 200 MG: 100 INJECTION, POWDER, LYOPHILIZED, FOR SOLUTION INTRAVENOUS at 06:12

## 2020-12-07 RX ADMIN — DEXAMETHASONE SODIUM PHOSPHATE 6 MG: 4 INJECTION INTRA-ARTICULAR; INTRALESIONAL; INTRAMUSCULAR; INTRAVENOUS; SOFT TISSUE at 03:12

## 2020-12-07 RX ADMIN — ENOXAPARIN SODIUM 40 MG: 40 INJECTION SUBCUTANEOUS at 05:12

## 2020-12-07 RX ADMIN — ACETAMINOPHEN 650 MG: 325 TABLET ORAL at 05:12

## 2020-12-07 RX ADMIN — CEFTRIAXONE 1 G: 1 INJECTION, POWDER, FOR SOLUTION INTRAMUSCULAR; INTRAVENOUS at 04:12

## 2020-12-07 RX ADMIN — ALBUTEROL SULFATE 2 PUFF: 90 AEROSOL, METERED RESPIRATORY (INHALATION) at 07:12

## 2020-12-07 NOTE — TELEPHONE ENCOUNTER
Patient escalated for no response. Pt is currently in ED, no contact made.     Reason for Disposition   Caller has cancelled the call before the first contact    Additional Information   Negative: Caller is angry or rude (e.g., hangs up, verbally abusive, yelling)   Negative: Caller hangs up   Negative: Caller has already spoken with the PCP and has no further questions.   Negative: Caller has already spoken with another triager and has no further questions.   Negative: Caller has already spoken with another triager or PCP AND has further questions AND triager able to answer questions.   Negative: Busy signal.  First attempt to contact caller.  Follow-up call scheduled within 15 minutes.   Negative: No answer.  First attempt to contact caller.  Follow-up call scheduled within 15 minutes.   Negative: Message left on identified voice mail   Negative: Message left on unidentified voice mail.  Phone number verified.   Negative: Message left with person in household.   Negative: Wrong number reached.  Phone number verified.   Negative: Second attempt to contact family AND no contact made.  Phone number verified.   Negative: Cell phone out of range.  Phone number verified.   Negative: Pager number given.  Answering service notified.   Negative: Patient already left for the hospital/clinic.   Negative: Unable to complete triage due to phone connection issues   Negative: NON-URGENT call redirected to PCP's office because it is open    Protocols used: NO CONTACT OR DUPLICATE CONTACT CALL-A-

## 2020-12-07 NOTE — ED TRIAGE NOTES
Jim Brown, a 50 y.o. male presents to the ED w/ complaint of worsening covid19 symptoms including HA, fever of 104 at home, and n/v/d. Denies CP, SOB, numbness, tingling, dizziness.    Triage note:  Chief Complaint   Patient presents with    Covid Positive     Review of patient's allergies indicates:   Allergen Reactions    Lisinopril Other (See Comments)     LOC: The patient is awake, alert, and oriented to place, time, situation. Affect is appropriate.  Speech is appropriate and clear.     APPEARANCE: Patient resting comfortably in no acute distress.  Patient is clean and well groomed.    SKIN: The skin is warm and dry; color consistent with ethnicity.  Patient has normal skin turgor and moist mucus membranes.  Skin intact; no breakdown or bruising noted.     MUSCULOSKELETAL: Patient moving upper and lower extremities without difficulty.  Denies weakness.     RESPIRATORY: Airway is open and patent. Respirations spontaneous, even, easy, and non-labored.  Patient has a normal effort and rate.  No accessory muscle use noted. Denies cough.     CARDIAC:  Normal rhythm and rate noted.  No peripheral edema noted. No complaints of chest pain.      ABDOMEN: Soft and non tender to palpation.  No distention noted.     NEUROLOGIC: Eyes open spontaneously.  Behavior appropriate to situation.  Follows commands; facial expression symmetrical.  Purposeful motor response noted; normal sensation in all extremities.

## 2020-12-07 NOTE — H&P
Hospital Medicine  History and Physical  [facility]      Patient Name: Jim Brown  MRN:  [id number]  Hospital Medicine Team: [facility] MED G Nikunj Walker MD  Date of Admission:  12/7/2020     Length of Stay:  LOS: 0 days     Principal Problem: COVID-19 Virus Infection    Chief complaint    Shortness of breath and fever x5 days    HPI  50-year-old man with past medical history of hypertension in BMI of 30 for  Patient reports to his baseline to approximately 5 days ago.  Reports having fever and worsening shortness of breath.  Reports that shortness of breath has worsened over the past couple of days.  Associated symptoms include decreased appetite, nausea without vomiting cough and body aches.  Reports he has a pulse ox at home and was getting O2 sats in the mid 80s with activity.  Reports that having fever as high it T-max of 104.3    Was seen in urgent care yesterday diagnosed with COVID.  Received 1 dose of Rocephin while he was there.  Was discharged with doxycycline but came to hospital before picking up prescription.  Reports feeling worse today so he came into the emergency room.    In the ER given dexamethasone x1 and supplemental oxygen  Review of Systems    Constitutional: Positive for fever, chills, fatigue, poor appetite   HENT:  Negative for sore throat, negative for trouble swallowing.    Eyes: Negative for photophobia, visual disturbance.   Respiratory: Positive for cough, shortness of breath  Cardiovascular: Negative for chest pain, palpitations, leg swelling.   Gastrointestinal:  Positive for diarrhea. Negative for abdominal pain, constipation, nausea, vomiting.   Endocrine: Negative for cold intolerance, heat intolerance.   Genitourinary: Negative for dysuria, frequency.   Musculoskeletal:  Positive for myalgias  Skin: Negative for rash  Neurological: Negative for dizziness, syncope, light-headedness.   Psychiatric/Behavioral: Negative for confusion, hallucinations,  anxiety    No past medical history on file.  Past Surgical History:   Procedure Laterality Date    COLONOSCOPY  8/10/15    TONSILLECTOMY       Family History   Problem Relation Age of Onset    Cancer Paternal Grandmother         colon cancer     Social History     Socioeconomic History    Marital status:      Spouse name: Not on file    Number of children: Not on file    Years of education: Not on file    Highest education level: Not on file   Occupational History    Not on file   Social Needs    Financial resource strain: Not on file    Food insecurity     Worry: Not on file     Inability: Not on file    Transportation needs     Medical: Not on file     Non-medical: Not on file   Tobacco Use    Smoking status: Never Smoker    Smokeless tobacco: Never Used   Substance and Sexual Activity    Alcohol use: Yes     Comment: socail    Drug use: No    Sexual activity: Not on file   Lifestyle    Physical activity     Days per week: Not on file     Minutes per session: Not on file    Stress: Not on file   Relationships    Social connections     Talks on phone: Not on file     Gets together: Not on file     Attends Latter day service: Not on file     Active member of club or organization: Not on file     Attends meetings of clubs or organizations: Not on file     Relationship status: Not on file   Other Topics Concern    Not on file   Social History Narrative    Not on file       Medications  No current facility-administered medications on file prior to encounter.      Current Outpatient Medications on File Prior to Encounter   Medication Sig Dispense Refill    albuterol (VENTOLIN HFA) 90 mcg/actuation inhaler Inhale 2 puffs into the lungs every 6 (six) hours as needed for Wheezing. Rescue 18 g 0    celecoxib (CELEBREX) 200 MG capsule Take 200 mg by mouth 2 (two) times daily.      doxycycline (VIBRA-TABS) 100 MG tablet Take 1 tablet (100 mg total) by mouth 2 (two) times daily. for 7 days 14  tablet 0    losartan (COZAAR) 50 MG tablet TAKE 1 TABLET BY MOUTH EVERY DAY 90 tablet 3    ondansetron (ZOFRAN) 4 MG tablet Take 1 tablet (4 mg total) by mouth daily as needed for Nausea. 30 tablet 0    pulse oximeter (PULSE OXIMETER) device by Apply Externally route 2 (two) times a day. Use twice daily at 8 AM and 3 PM and record the value in "[employer]hart as directed. 1 each 0       Allergies  Lisinopril    Physical Examination  Temp:  [98.8 °F (37.1 °C)-100 °F (37.8 °C)]   Pulse:  []   Resp:  [18-20]   BP: (124-144)/(66-80)   SpO2:  [93 %-96 %]     Gen:  Ill-appearing, conversant  Head: NC, AT  Eyes: PERRLA, EOMI  Throat: MMM, OP clear  CV: RRR, no M/R/G, no peripheral edema, no JVD  Resp:  No increased work of breathing, clear couple fields crackles in bilateral bases improved with cough, cough nonproductive  GI: Soft, NT, ND, +BS  Ext: MAEW, no c/c/e  Neuro: AAOx3, CN grossly intact, no focal neurologic deficits  Psychiatry: Normal mood, normal affect    Laboratory:  Recent Labs   Lab 12/07/20  1237   WBC 4.11   LYMPH 12.2*  0.5*   HGB 12.8*   HCT 39.4*        Recent Labs   Lab 12/07/20  1237   *   K 3.7   CL 97   CO2 25   BUN 11   CREATININE 1.0   *   CALCIUM 8.5*     Recent Labs   Lab 12/07/20  1237   ALKPHOS 38*   ALT 38   AST 37   ALBUMIN 3.4*   PROT 7.2   BILITOT 0.6      Recent Labs     12/07/20  1237   FERRITIN 1,667*   .6*   *   LACTATE 1.4       All labs within the last 24 hours were reviewed.     Microbiology:  Lab Results   Component Value Date    [id number] Positive (A) 12/06/2020       Microbiology Results (last 7 days)     Procedure Component Value Units Date/Time    Blood culture (site 1) [[id number]]     Order Status: No result Specimen: Blood     Blood culture (site 2) [[id number]]     Order Status: No result Specimen: Blood             Imaging  ECG Results          EKG 12-lead (Final result)  Result time 12/07/20 13:08:21    Final result by Interface, Lab  In seven (12/07/20 13:08:21)                 Narrative:    Test Reason : Z20.828,    Vent. Rate : 103 BPM     Atrial Rate : 103 BPM     P-R Int : 130 ms          QRS Dur : 092 ms      QT Int : 340 ms       P-R-T Axes : 053 028 -04 degrees     QTc Int : 445 ms    Sinus tachycardia  Nonspecific T wave abnormality Now present  Abnormal ECG  When compared with ECG of 14-AUG-2018 08:30,  Vent. rate has increased BY  39 BPM  Confirmed by CHIO MIN MD ([id number]) on 12/7/2020 1:08:07 PM    Referred By: AAAREFERR   SELF           Confirmed By:CHIO MIN MD                              No results found for this or any previous visit.    X-Ray Chest AP Portable  Narrative: EXAMINATION:  XR CHEST AP PORTABLE    CLINICAL HISTORY:  Suspected Covid-19 Virus Infection;    COMPARISON:  Comparison is made to 12/06/2020 at 15:02.  Clinical information of shortness of breath, with known positive COVID-19 status.    FINDINGS:  Heart size and the appearance of the cardiomediastinal silhouette have not changed appreciably since the examination referenced above.  Lung zones continue abnormal, with multifocal pulmonary parenchymal opacities representing patchy airspace consolidation and linear subsegmental atelectasis observed bilaterally, particularly in the peripheral lung zones.  This appearance is highly suggestive of COVID-19 pulmonary involvement.  The lung zones appear essentially stable when compared to the examination referenced above, however, and no significant new areas of airspace consolidation or volume loss have developed since that time.  No pleural fluid.  No pneumothorax.  Impression: Continued abnormal chest radiograph demonstrating findings strongly suggestive of pulmonary involvement with COVID-19, particularly given the patient's known positive status.  However, allowing for differences in projection and a poorer inspiratory depth level on the current examination, there has been no significant detrimental  interval change in the appearance of the chest since 12/06/2020 at 15:02.    Electronically signed by: Jet Raygoza MD  Date:    12/07/2020  Time:    13:21      All imaging within the last 24 hours was reviewed.       Assessment and Plan:    Active Hospital Problems    Diagnosis  POA    *Pneumonia due to COVID-19 virus [U07.1, J12.89]  Yes      Resolved Hospital Problems   No resolved problems to display.     COVID-19 Virus Infection  Viral Pneumonia due to COVID-19  COVID symptoms:  Fever, shortness of breath, cough, body ache and diarrhea  Started ceftriaxone 12/6  Start azithromycin 12/7  Will start remdesivir today  - COVID-19 testing:   - Isolation: Airborne/Droplet. Surgical mask on patient. Notify Infection Control  - Diagnostics: Trend Q48hrs if stable, more frequently if patient decompensating     Laboratory/Study Frequency Result   CBC Admit & Q48h Lymphocyte count 500   CMP Admit & Q48h Na134   Ferritin Admit & Q48h 1667   D-dimer Admit & Q48h Ordered   CRP Admit & Q48h 141.6   LDH Admit & Q48h 417   CPK Admit & Q24h if elevated Five hundred seventy-three   Troponin Admit & Q6h if elevated Orders   BNP Admit Ordered   Vitamin D Admit Ordered   Procalcitonin Admit Order     Lymphopenia, hyponatremia, hyperferritinemia, elevated troponin, elevated d-dimer, age, and medical comorbidities are significant predictors of poor clinical outcome    - [facility]    - Monitoring:   - Telemetry & Continuous Pulse Oximetry if > 3L via NC    - Nutrition:    - Multivitamin PO daily   - Boost supplement   - Vitamin D 1000IU daily if deficient   - Ascorbic acid 500mg PO bid    - Supportive Care:   - acetaminophen 650mg PO Q6hr PRN fever/headache   - loperamide PRN viral diarrhea   - IVF if indicated, restrictive strategy preferred, no maintenance IV if able   - VTE PPx: enoxaparin or heparin SQ unless contraindicated    - Antibiotics:   - ceftriaxone 1g Q24h x 5 days  - azithromycin  500mg IV/PO x3    - Investigational Therapies:  - Remdesivir following discussion of potential risks and benefits?  Started  -    Acute Hypoxemic Respiratory Failure  Start dexamethasone 12/7    - Order RT consult via Respiratory Communication for COVID Protocols  - Order Incentive Spirometer Q4h  - Order Flutter Valve Q4h  - Continuous Pulse Oximetry  - Goal SpO2 92-96%  - Supplemental O2 via LFNC, VentiMask, or HFNC (see Respiratory Support Oxygen Therapies)  - If wheezing, albuterol INH Q6h scheduled & PRN  - Proning Protocol if patient is a candidate (see HM Proning Protocol)   - GCS >13, able to self-prone  - If deterioration, may warrant trial of NIPPV and transfer to [facility] pressure room or immediate ICU consult  - Dexamethasone, will follow glucose t.i.d. with meals a.c. and HS    HTN  Holding home losartan since BP low normal today, may need to restart in AM.    Diet regular  Prophylaxis Lovenox  Cold full    - Full code    VTE High Risk Prophylaxis:   VTE Risk Mitigation (From admission, onward)         Ordered     enoxaparin injection 40 mg  Every 24 hours      12/07/20 1542     IP VTE HIGH RISK PATIENT  Once      12/07/20 1542     Place sequential compression device  Until discontinued      12/07/20 1542                  High Risk Conditions:  Patient has a condition that poses threat to life and bodily function: Severe Respiratory Distress    Nikunj Walker MD  Hospital Medicine  Pager: [phone]  Spectra; [phone]

## 2020-12-07 NOTE — ED PROVIDER NOTES
Encounter Date: 12/7/2020       History     Chief Complaint   Patient presents with    Covid Positive     The patient is a 50-year-old male who was sent by his primary care physician for further workup secondary to COVID-19 infection.  The patient states that he began exhibiting symptoms 5 days ago.  His wife was recently diagnosed with COVID-19.  Since then, he states that his symptoms have been constant and progressively worsening.  He reports a mild headache, fever up to 104, nonproductive cough, and dyspnea on exertion.  He also reports associated malaise and nausea.  He was seen by his primary care physician yesterday.  Overnight, he states that his pulse ox decreased to 89%.  He also had persistent fever.  He called his PCP earlier today and was instructed to come to the emergency department.  Currently, the patient states that his symptoms are mild.        Review of patient's allergies indicates:   Allergen Reactions    Lisinopril Other (See Comments)     No past medical history on file.  Past Surgical History:   Procedure Laterality Date    COLONOSCOPY  8/10/15    TONSILLECTOMY       Family History   Problem Relation Age of Onset    Cancer Paternal Grandmother         colon cancer     Social History     Tobacco Use    Smoking status: Never Smoker    Smokeless tobacco: Never Used   Substance Use Topics    Alcohol use: Yes     Comment: socail    Drug use: No     Review of Systems  General:  Reports fever.  Reports chills.  Reports generalized weakness.  HENT: Denies sore throat.  Denies earache.  Reports rhinorrhea.  Eyes: Denies visual changes.  Denies eye pain.  Denies drainage.  Cardiovascular:  Reports chest pain with coughing.  Reports shortness of breath.  Denies orthopnea.  Denies dyspnea on exertion.  Respiratory:  Reports shortness of breath.  Denies wheezing.  Reports nonproductive coughing.  GI: Denies abdominal pain.  Reports nausea.  Denies vomiting.  Denies diarrhea.  Denies  constipation.  Denies melena.  Denies hematochezia.  : Denies dysuria.  Denies hematuria.  Denies pelvic pain.  Denies swelling.  Skin: Denies rashes.  Denies lesions.  Denies pallor.  Neuro:  Reports mild headache.  Denies head trauma.  Denies numbness.  Denies focal weakness.  Musculoskeletal: Denies neck pain.  Denies back pain.          Physical Exam     Initial Vitals [12/07/20 1119]   BP Pulse Resp Temp SpO2   (!) 144/80 100 18 98.8 °F (37.1 °C) 96 %      MAP       --         Physical Exam  General: No apparent distress.  Well-nourished.  Well-developed.  Alert and oriented x3.  Nontoxic in appearance.  HENT: Moist mucous membranes.  Normocephalic atraumatic.  Oropharynx clear.    Eyes: Pupils equally round and reactive to light.  Extraocular movements intact.  No scleral icterus.  No conjunctival pallor.  Cardiovascular:  Regular rhythm.  Tachycardia noted.  No murmurs, rubs, or gallops.  Brisk capillary fill.  2+ distal pulses.  Respiratory: Clear to auscultation bilaterally.  No wheezes, rales, or rhonchi.  Active coughing noted during my examination.  No respiratory distress.  Abdomen: Soft.  Nontender.  Nondistended.  No guarding.  No rebound.  No masses.  No abdominal bruit auscultated.  Skin: No rashes.  No lesions.  No pallor.  No jaundice.  Neuro: Cranial nerves II through XII grossly intact.  Moving all extremities equally.  No sensory deficits.  Strength 5 out of 5 in all 4 extremities.  Musculoskeletal: Neck supple.        ED Course   Procedures  Labs Reviewed   CBC W/ AUTO DIFFERENTIAL - Abnormal; Notable for the following components:       Result Value    RBC 4.17 (*)     Hemoglobin 12.8 (*)     Hematocrit 39.4 (*)     Lymph # 0.5 (*)     Mono # 0.2 (*)     Gran % 83.0 (*)     Lymph % 12.2 (*)     All other components within normal limits   COMPREHENSIVE METABOLIC PANEL - Abnormal; Notable for the following components:    Sodium 134 (*)     Glucose 120 (*)     Calcium 8.5 (*)     Albumin 3.4  (*)     Alkaline Phosphatase 38 (*)     All other components within normal limits   C-REACTIVE PROTEIN - Abnormal; Notable for the following components:    .6 (*)     All other components within normal limits   FERRITIN - Abnormal; Notable for the following components:    Ferritin 1,667 (*)     All other components within normal limits   LACTATE DEHYDROGENASE - Abnormal; Notable for the following components:     (*)     All other components within normal limits   CK - Abnormal; Notable for the following components:     (*)     All other components within normal limits   LACTIC ACID, PLASMA        ECG Results          EKG 12-lead (Final result)  Result time 12/07/20 13:08:21    Final result by Interface, Lab [facility] (12/07/20 13:08:21)                 Narrative:    Test Reason : Z20.828,    Vent. Rate : 103 BPM     Atrial Rate : 103 BPM     P-R Int : 130 ms          QRS Dur : 092 ms      QT Int : 340 ms       P-R-T Axes : 053 028 -04 degrees     QTc Int : 445 ms    Sinus tachycardia  Nonspecific T wave abnormality Now present  Abnormal ECG  When compared with ECG of 14-AUG-2018 08:30,  Vent. rate has increased BY  39 BPM  Confirmed by CHIO MIN MD ([id number]) on 12/7/2020 1:08:07 PM    Referred By: AAAREFERR   SELF           Confirmed By:CHIO MIN MD                            Imaging Results          X-Ray Chest AP Portable (Final result)  Result time 12/07/20 13:21:32    Final result by Jet Raygoza MD (12/07/20 13:21:32)                 Impression:      Continued abnormal chest radiograph demonstrating findings strongly suggestive of pulmonary involvement with COVID-19, particularly given the patient's known positive status.  However, allowing for differences in projection and a poorer inspiratory depth level on the current examination, there has been no significant detrimental interval change in the appearance of the chest since 12/06/2020 at 15:02.      Electronically signed by: Jet  MD Idalmis  Date:    12/07/2020  Time:    13:21             Narrative:    EXAMINATION:  XR CHEST AP PORTABLE    CLINICAL HISTORY:  Suspected Covid-19 Virus Infection;    COMPARISON:  Comparison is made to 12/06/2020 at 15:02.  Clinical information of shortness of breath, with known positive COVID-19 status.    FINDINGS:  Heart size and the appearance of the cardiomediastinal silhouette have not changed appreciably since the examination referenced above.  Lung zones continue abnormal, with multifocal pulmonary parenchymal opacities representing patchy airspace consolidation and linear subsegmental atelectasis observed bilaterally, particularly in the peripheral lung zones.  This appearance is highly suggestive of COVID-19 pulmonary involvement.  The lung zones appear essentially stable when compared to the examination referenced above, however, and no significant new areas of airspace consolidation or volume loss have developed since that time.  No pleural fluid.  No pneumothorax.                              X-Rays:   Independently Interpreted Readings:   Chest X-Ray: Continued abnormal chest radiograph demonstrating findings strongly suggestive of pulmonary involvement with COVID-19,     Medical Decision Making:   Initial Assessment:   This is an emergent evaluation.  The patient has known COVID-19 pneumonia.  Because of his history of asthma, I believe the patient will meet criteria for bamlanimab.  A workup has been ordered to assess for the need for admission, dexamethasone, and/or remdesevir.  I will reassess.  ED Management:  1:41 p.m.  The patient's laboratory studies and chest x-ray are consistent with COVID.  I will ambulate the patient and repeat vital signs to make a decision on disposition.  If the patient is able to be discharged, he does meet criteria forbamlanivimab secondary to a history of COPD.    2:47 p.m.  The patient's oxygen saturation drops to 88% with very minimal ambulation.  He is also quite  short of breath during that time.  Decadron IV has been ordered.  I will discuss admission with Hospital Medicine.  The patient will be placed on oxygen.    3:25 p.m.  Hospital Medicine has agreed to evaluate and admit this patient.                             Clinical Impression:       ICD-10-CM ICD-9-CM   1. Pneumonia due to COVID-19 virus  U07.1 480.8    J12.89    2. Suspected COVID-19 virus infection  Z20.828 V01.79   3. Hypoxia  R09.02 799.02                      Disposition:   Disposition: Admitted  Condition: Fair     ED Disposition Condition    Admit                             Feliz Reid MD  12/07/20 0569

## 2020-12-08 LAB
APTT BLDCRRT: 31.1 SEC (ref 21–32)
CK SERPL-CCNC: 396 U/L (ref 20–200)
ESTIMATED AVG GLUCOSE: 103 MG/DL (ref 68–131)
HBA1C MFR BLD HPLC: 5.2 % (ref 4–5.6)
INR PPP: 1 (ref 0.8–1.2)
POCT GLUCOSE: 105 MG/DL (ref 70–110)
POCT GLUCOSE: 110 MG/DL (ref 70–110)
POCT GLUCOSE: 130 MG/DL (ref 70–110)
PROTHROMBIN TIME: 11.1 SEC (ref 9–12.5)

## 2020-12-08 PROCEDURE — 63700000 PHARM REV CODE 250 ALT 637 W/O HCPCS: Performed by: INTERNAL MEDICINE

## 2020-12-08 PROCEDURE — 25000242 PHARM REV CODE 250 ALT 637 W/ HCPCS: Performed by: INTERNAL MEDICINE

## 2020-12-08 PROCEDURE — 25000003 PHARM REV CODE 250: Performed by: INTERNAL MEDICINE

## 2020-12-08 PROCEDURE — 27000221 HC OXYGEN, UP TO 24 HOURS

## 2020-12-08 PROCEDURE — 63600175 PHARM REV CODE 636 W HCPCS: Performed by: INTERNAL MEDICINE

## 2020-12-08 PROCEDURE — 94640 AIRWAY INHALATION TREATMENT: CPT

## 2020-12-08 PROCEDURE — 99232 SBSQ HOSP IP/OBS MODERATE 35: CPT | Mod: ,,, | Performed by: INTERNAL MEDICINE

## 2020-12-08 PROCEDURE — 11000001 HC ACUTE MED/SURG PRIVATE ROOM

## 2020-12-08 PROCEDURE — 99232 PR SUBSEQUENT HOSPITAL CARE,LEVL II: ICD-10-PCS | Mod: ,,, | Performed by: INTERNAL MEDICINE

## 2020-12-08 PROCEDURE — 36415 COLL VENOUS BLD VENIPUNCTURE: CPT

## 2020-12-08 PROCEDURE — 94761 N-INVAS EAR/PLS OXIMETRY MLT: CPT

## 2020-12-08 PROCEDURE — 85730 THROMBOPLASTIN TIME PARTIAL: CPT

## 2020-12-08 PROCEDURE — 82550 ASSAY OF CK (CPK): CPT

## 2020-12-08 PROCEDURE — 85610 PROTHROMBIN TIME: CPT

## 2020-12-08 PROCEDURE — 27100098 HC SPACER

## 2020-12-08 RX ORDER — LOSARTAN POTASSIUM 50 MG/1
50 TABLET ORAL DAILY
Status: DISCONTINUED | OUTPATIENT
Start: 2020-12-08 | End: 2020-12-08

## 2020-12-08 RX ORDER — LOSARTAN POTASSIUM 50 MG/1
50 TABLET ORAL DAILY
Status: DISCONTINUED | OUTPATIENT
Start: 2020-12-09 | End: 2020-12-11 | Stop reason: HOSPADM

## 2020-12-08 RX ADMIN — ALBUTEROL SULFATE 2 PUFF: 90 AEROSOL, METERED RESPIRATORY (INHALATION) at 01:12

## 2020-12-08 RX ADMIN — CEFTRIAXONE 1 G: 1 INJECTION, POWDER, FOR SOLUTION INTRAMUSCULAR; INTRAVENOUS at 05:12

## 2020-12-08 RX ADMIN — THERA TABS 1 TABLET: TAB at 08:12

## 2020-12-08 RX ADMIN — IPRATROPIUM BROMIDE 2 PUFF: 17 AEROSOL, METERED RESPIRATORY (INHALATION) at 07:12

## 2020-12-08 RX ADMIN — DEXAMETHASONE 6 MG: 4 TABLET ORAL at 08:12

## 2020-12-08 RX ADMIN — BENZONATATE 200 MG: 100 CAPSULE ORAL at 01:12

## 2020-12-08 RX ADMIN — IPRATROPIUM BROMIDE 2 PUFF: 17 AEROSOL, METERED RESPIRATORY (INHALATION) at 02:12

## 2020-12-08 RX ADMIN — OXYCODONE HYDROCHLORIDE AND ACETAMINOPHEN 500 MG: 500 TABLET ORAL at 08:12

## 2020-12-08 RX ADMIN — MUPIROCIN: 20 OINTMENT TOPICAL at 10:12

## 2020-12-08 RX ADMIN — AZITHROMYCIN MONOHYDRATE 500 MG: 250 TABLET ORAL at 05:12

## 2020-12-08 RX ADMIN — GUAIFENESIN AND DEXTROMETHORPHAN 10 ML: 100; 10 SYRUP ORAL at 10:12

## 2020-12-08 RX ADMIN — ENOXAPARIN SODIUM 40 MG: 40 INJECTION SUBCUTANEOUS at 05:12

## 2020-12-08 RX ADMIN — ALBUTEROL SULFATE 2 PUFF: 90 AEROSOL, METERED RESPIRATORY (INHALATION) at 02:12

## 2020-12-08 RX ADMIN — GUAIFENESIN AND DEXTROMETHORPHAN 10 ML: 100; 10 SYRUP ORAL at 01:12

## 2020-12-08 RX ADMIN — REMDESIVIR 100 MG: 100 INJECTION, POWDER, LYOPHILIZED, FOR SOLUTION INTRAVENOUS at 04:12

## 2020-12-08 RX ADMIN — MUPIROCIN: 20 OINTMENT TOPICAL at 08:12

## 2020-12-08 RX ADMIN — ALBUTEROL SULFATE 2 PUFF: 90 AEROSOL, METERED RESPIRATORY (INHALATION) at 07:12

## 2020-12-08 RX ADMIN — OXYCODONE HYDROCHLORIDE AND ACETAMINOPHEN 500 MG: 500 TABLET ORAL at 10:12

## 2020-12-08 NOTE — TELEPHONE ENCOUNTER
Patient escalated for no response. Patient has been admitted to hospital.    Reason for Disposition   Information only question and nurse able to answer    Additional Information   Negative: Nursing judgment   Negative: Nursing judgment   Negative: Nursing judgment   Negative: Nursing judgment    Protocols used: INFORMATION ONLY CALL - NO TRIAGE-A-OH

## 2020-12-08 NOTE — PLAN OF CARE
Pt remained free from falls or injuries this shift. Pt independent in repositioning. No skin breakdown noticed. Pt rested well through the night.  Had an episode of persistent coughing which dropped O2 sats to 89% so 2L NC applied. Prn cough meds provided relief

## 2020-12-08 NOTE — PROGRESS NOTES
Hospital Medicine  Progress Note  [facility]      Patient Name: Jim Brown  MRN:  [id number]  Hospital Medicine Team: [facility] HOSP MED G Gustavo Griffin MD  Date of Admission:  12/7/2020     Length of Stay:  LOS: 1 day       Principal Problem:  Pneumonia due to COVID-19 virus      HPI:    50-year-old man with past medical history of hypertension in BMI of 30 for  Patient reports to his baseline to approximately 5 days ago.  Reports having fever and worsening shortness of breath.  Reports that shortness of breath has worsened over the past couple of days.  Associated symptoms include decreased appetite, nausea without vomiting cough and body aches.  Reports he has a pulse ox at home and was getting O2 sats in the mid 80s with activity.  Reports that having fever as high it T-max of 104.3     Was seen in urgent care yesterday diagnosed with COVID.  Received 1 dose of Rocephin while he was there.  Was discharged with doxycycline but came to hospital before picking up prescription.  Reports feeling worse today so he came into the emergency room.    Hospital Course:    Jim Brown was admitted to Hospital Medicine for treatment of COVID-19 viral infection and was treated with supportive care following a comprehensive physical, radiographic, and lab evaluation tailored to the current standard of care for COVID19. Please review the admission H&P for details of his initial treatment. Though symptomatically he improved on dexamethasone and remdesivir, he remained dyspneic with exertion as limited as the walk to the bathroom in his room and required oxygen on hospital day 2.    Interval History:       Mr. Brown feels better overall, with more energy than prior, and breathes comfortably at rest, but quickly desaturates with exertion as limited as the walk to the bathroom in his room. He has no new complaints, with no chest pain, and his other symptoms including myalgias and cough have improved.  Fever curve much improved. His diarrhea persists.    Review of Systems:    Respiratory: +cough, dyspnea  Cardiovascular: no chest pain, palpitations, lower extremity edema  GI: no abdominal pain, +diarrhea    Inpatient Medications:    Current Facility-Administered Medications:     acetaminophen tablet 650 mg, 650 mg, Oral, Q6H PRN, Nikunj Walker MD, 650 mg at 12/07/20 1755    albuterol inhaler 2 puff, 2 puff, Inhalation, Q6H, 2 puff at 12/08/20 0700 **AND** MDI Q6H, , , Q6H, Nikunj Walker MD    ascorbic acid (vitamin C) tablet 500 mg, 500 mg, Oral, BID, Nikunj Walker MD, 500 mg at 12/08/20 0828    azithromycin tablet 500 mg, 500 mg, Oral, Q24H, Nikunj Walker MD, 500 mg at 12/07/20 1628    benzonatate capsule 200 mg, 200 mg, Oral, TID PRN, Gustavo Griffin MD, 200 mg at 12/08/20 0150    cefTRIAXone injection 1 g, 1 g, Intravenous, Q24H, Nikunj Walker MD, 1 g at 12/07/20 1638    dexAMETHasone tablet 6 mg, 6 mg, Oral, Daily, Nikunj Walker MD, 6 mg at 12/08/20 0828    dextromethorphan-guaifenesin  mg/5 ml liquid 10 mL, 10 mL, Oral, Q4H PRN, Nikunj Walker MD, 10 mL at 12/08/20 0150    dextrose 50% injection 12.5 g, 12.5 g, Intravenous, PRN, Nikunj Walker MD    dextrose 50% injection 25 g, 25 g, Intravenous, PRN, Nikunj Walker MD    enoxaparin injection 40 mg, 40 mg, Subcutaneous, Q24H, Nikunj Walker MD, 40 mg at 12/07/20 1755    glucagon (human recombinant) injection 1 mg, 1 mg, Intramuscular, PRN, Nikunj Walker MD    glucose chewable tablet 16 g, 16 g, Oral, PRN, Nikunj Walker MD    glucose chewable tablet 24 g, 24 g, Oral, PRN, Nikunj Walker MD    influenza (QUADRIVALENT PF) vaccine 0.5 mL, 0.5 mL, Intramuscular, vaccine x 1 dose, Gustavo Griffin MD    ipratropium inhaler 2 puff, 2 puff, Inhalation, Q6H, 2 puff at 12/08/20 0700 **AND** MDI Q6H, , , Q6H, Nikunj Walker MD    loperamide capsule 2 mg, 2 mg, Oral, Q6H PRN, Nikunj Walker MD     "melatonin tablet 6 mg, 6 mg, Oral, Nightly PRN, Nikunj Walker MD    multivitamin tablet, 1 tablet, Oral, Daily, Nikunj Walker MD, 1 tablet at 12/08/20 0828    mupirocin 2 % ointment, , Nasal, BID, Gustavo Griffin MD    ondansetron disintegrating tablet 8 mg, 8 mg, Oral, Q8H PRN, Nikunj Walker MD    promethazine tablet 25 mg, 25 mg, Oral, Q6H PRN, Nikunj Walker MD    [COMPLETED] remdesivir 200 mg in sodium chloride 0.9% 250 mL infusion, 200 mg, Intravenous, Q24H, 200 mg at 12/07/20 1830 **FOLLOWED BY** remdesivir 100 mg in sodium chloride 0.9% 250 mL infusion, 100 mg, Intravenous, Q24H, Nikunj Walker MD    sodium chloride 0.9% flush 10 mL, 10 mL, Intravenous, PRN, Nikunj Walker MD      Physical Exam:      Intake/Output Summary (Last 24 hours) at 12/8/2020 1357  Last data filed at 12/7/2020 2300  Gross per 24 hour   Intake 360 ml   Output --   Net 360 ml     Wt Readings from Last 3 Encounters:   12/07/20 120.2 kg (264 lb 15.9 oz)   12/06/20 120.2 kg (265 lb)   07/26/18 120.3 kg (265 lb 3.4 oz)       BP (!) 142/84 (BP Location: Right arm, Patient Position: Lying)   Pulse 80   Temp 97.7 °F (36.5 °C) (Oral)   Resp (!) 29   Ht 6' 2" (1.88 m)   Wt 120.2 kg (264 lb 15.9 oz)   SpO2 97%   BMI 34.02 kg/m²     GEN: NAD, conversant  Resp: coarse bilateral breath sounds with bilateral crackles, normal work of breathing on 2L via NC  CV: RRR, no edema  GI: Overweight, soft, NTND  Skin: no rash  Neuro: AAOx3, CN grossly intact, no focal neurologic deficits    Laboratory:  Lab Results   Component Value Date    [id number] Positive (A) 12/06/2020       Recent Labs   Lab 12/07/20  1237   WBC 4.11   LYMPH 12.2*  0.5*   HGB 12.8*   HCT 39.4*        Recent Labs   Lab 12/07/20  1237   *   K 3.7   CL 97   CO2 25   BUN 11   CREATININE 1.0   *   CALCIUM 8.5*     Recent Labs   Lab 12/07/20  1237 12/08/20  0252   ALKPHOS 38*  --    ALT 38  --    AST 37  --    ALBUMIN 3.4*  --    PROT 7.2  " --    BILITOT 0.6  --    INR  --  1.0        Recent Labs     12/07/20  1237 12/07/20  1639 12/08/20  0252   DDIMER  --  0.26  0.26  --    FERRITIN 1,667*  --   --    .6*  --   --    *  --   --    BNP  --  14  14  --    TROPONINI  --  <0.006  --    *  --  396*       All labs within the last 24 hours were reviewed.     Microbiology:  Microbiology Results (last 7 days)     Procedure Component Value Units Date/Time    Blood culture (site 2) [[id number]] Collected: 12/07/20 1638    Order Status: Completed Specimen: Blood from Peripheral, Hand, Right Updated: 12/08/20 0115     Blood Culture, Routine No Growth to date    Narrative:      Site # 2, aerobic only    Blood culture (site 1) [[id number]] Collected: 12/07/20 1638    Order Status: Completed Specimen: Blood from Peripheral, Antecubital, Left Updated: 12/08/20 0115     Blood Culture, Routine No Growth to date    Narrative:      Site # 1, aerobic and anaerobic            Imaging  ECG Results          EKG 12-lead (Final result)  Result time 12/07/20 13:08:21    Final result by Interface, Lab In [facility] (12/07/20 13:08:21)                 Narrative:    Test Reason : Z20.828,    Vent. Rate : 103 BPM     Atrial Rate : 103 BPM     P-R Int : 130 ms          QRS Dur : 092 ms      QT Int : 340 ms       P-R-T Axes : 053 028 -04 degrees     QTc Int : 445 ms    Sinus tachycardia  Nonspecific T wave abnormality Now present  Abnormal ECG  When compared with ECG of 14-AUG-2018 08:30,  Vent. rate has increased BY  39 BPM  Confirmed by CHIO MIN MD ([id number]) on 12/7/2020 1:08:07 PM    Referred By: [email or web address]   SELF           Confirmed By:CHIO MIN MD                              No results found for this or any previous visit.    X-Ray Chest AP Portable  Narrative: EXAMINATION:  XR CHEST AP PORTABLE    CLINICAL HISTORY:  Suspected Covid-19 Virus Infection;    COMPARISON:  Comparison is made to 12/06/2020 at 15:02.  Clinical information of shortness of  breath, with known positive COVID-19 status.    FINDINGS:  Heart size and the appearance of the cardiomediastinal silhouette have not changed appreciably since the examination referenced above.  Lung zones continue abnormal, with multifocal pulmonary parenchymal opacities representing patchy airspace consolidation and linear subsegmental atelectasis observed bilaterally, particularly in the peripheral lung zones.  This appearance is highly suggestive of COVID-19 pulmonary involvement.  The lung zones appear essentially stable when compared to the examination referenced above, however, and no significant new areas of airspace consolidation or volume loss have developed since that time.  No pleural fluid.  No pneumothorax.  Impression: Continued abnormal chest radiograph demonstrating findings strongly suggestive of pulmonary involvement with COVID-19, particularly given the patient's known positive status.  However, allowing for differences in projection and a poorer inspiratory depth level on the current examination, there has been no significant detrimental interval change in the appearance of the chest since 12/06/2020 at 15:02.    Electronically signed by: Jet Raygoza MD  Date:    12/07/2020  Time:    13:21      All imaging within the last 24 hours was reviewed.     Assessment and Plan:    Active Hospital Problems    Diagnosis  POA    *Pneumonia due to COVID-19 virus [U07.1, J12.89]  Yes      Resolved Hospital Problems   No resolved problems to display.       COVID-19 Virus Infection  Viral Pneumonia due to COVID-19    - COVID-19 testing: Positive 12/6  - Isolation: Airborne/Droplet. Surgical mask on patient. Notify Infection Control  - Diagnostics: Trend Q48hrs if stable, more frequently if patient decompensating  - Management: Per [facility] COVID Treatment Protocol    - Telemetry & Pulse Oximetry Q4H    - Nutrition:        - Multivitamin PO daily       - Boost supplement       - Vitamin D level normal       -  Ascorbic acid 500mg PO bid    - Supportive Care:       - acetaminophen 650mg PO Q6hr PRN fever/headache       - loperamide PRN viral diarrhea       - IVF if indicated, restrictive strategy preferred, no maintenance IV if able       - VTE PPx: enoxaparin     - Antibiotics:       - ceftriaxone 1g Q24h x 5 days       - azithromycin 500mg po x3 days     - Remdesivir started 12/7     - Dexamethasone started 12/7     - Convalescent Plasma not indicated    Acute Hypoxemic Respiratory Failure      - Worsened mildly from admission, now on 2L. Suspect he would desaturate further with exertion    - Order RT consult via Respiratory Communication for COVID Protocols    - Incentive Spirometer Q4h, Flutter Valve Q4h    - Continuous Pulse Oximetry, goal SpO2 92-96%    - Supplemental O2 via LFNC, VentiMask, or HFNC (see Respiratory Support Oxygen Therapies)    - If wheezing, albuterol INH Q6h scheduled & PRN    - Proning Protocol if patient is a candidate (see HM Proning Protocol)   - GCS >13, able to self-prone    - If deterioration, may warrant trial of NIPPV in neg pressure room or immediate ICU consult    Elevated CPK    - Improving, no longer trending    Essential hypertension    - BP trending up, resuming home losartan tomorrow    Advance Care Planning  Goals of care, counseling/discussion    - Curative, full code    VTE High Risk Prophylaxis:   VTE Risk Mitigation (From admission, onward)         Ordered     enoxaparin injection 40 mg  Every 24 hours      12/07/20 1542     IP VTE HIGH RISK PATIENT  Once      12/07/20 1542     Place sequential compression device  Until discontinued      12/07/20 1542                Subsequent Inpatient Hospital Care  Level 2 22476 Total visit time was 25 minutes or greater with greater than 50% of time spent in counseling and coordination of care.     High Risk Conditions:  Patient has a condition that poses threat to life and bodily function: COVID19 virus infection with acute hypoxic  respiratory failure    Gustavo Griffin M.D.  Department of Hospital Medicine  [facility]  [phone] (pager)

## 2020-12-08 NOTE — PLAN OF CARE
SW assigned to case today 12/8/20. SW will assist team with DC needs. BRAYDEN in communication with CM.    Kaley Santacruz LMSW   - Case Management

## 2020-12-08 NOTE — PLAN OF CARE
12/7/2020 11:31 AM   Hypoxia [R09.02]  Pneumonia due to COVID-19 virus [U07.1, J12.89]  Suspected COVID-19 virus infection [Z20.828]   Covid positive 12/6/2020    PMHX:  hypertension.      Due to Covid precautions CM contacted patient via his room phone [phone] to complete discharge assessment.     Pt lives with his wife and dependent child in a single story home, no steps.  Equipment Used at Home:  Pulse ox.  Independent functional status, working and still driving.    Not a dialysis patient and is not on COUMADIN.    Contact phone numbers provided.  All questions answered.    When medically stable, anticipate discharge home with family, will need 6 minute walk test at discharge to determine home oxygen needs.    CM to follow for discharge planning needs.  DULCE DickersonN RN  Case Management  [phone]     Reports he has a pulse ox at home    Cynthia Myers MD     Payor: [facility] / Plan: [facility] CHOICE PLUS / Product Type: Commercial /        [id number] - [address] & [address] [address]  Phone: [phone] Fax: [phone]    [facility][id number] [address] & [address] [address]  Phone: [phone] Fax: [phone]       Extended Emergency Contact Information  Primary Emergency Contact: Denice Brown   [facility]  Home Phone: [phone]  Relation: Spouse        12/08/20 1655   Discharge Assessment   Assessment Type Discharge Planning Assessment   Confirmed/corrected address and phone number on facesheet? Yes   Assessment information obtained from? Patient   Expected Length of Stay (days) 1   Communicated expected length of stay with patient/caregiver yes   Prior to hospitilization cognitive status: Alert/Oriented   Prior to hospitalization functional status: Independent    Current cognitive status: Alert/Oriented   Current Functional Status: Independent   Facility Arrived From: NA   Lives With spouse;child(maite), dependent   Able to Return to Prior Arrangements yes   Is patient able to care for self after discharge? Yes   Who are your caregiver(s) and their phone number(s)? Spouse: Denice Brown [phone]   Patient's perception of discharge disposition home or selfcare   Readmission Within the Last 30 Days no previous admission in last 30 days   Patient currently being followed by outpatient case management? No   Patient currently receives any other outside agency services? No   Equipment Currently Used at Home other (see comments)  (Pulse ox)   Do you have any problems affording any of your prescribed medications? TBD   Is the patient taking medications as prescribed? yes   Does the patient have transportation home? Yes   Transportation Anticipated family or friend will provide   Dialysis Name and Scheduled days NA   Does the patient receive services at the Coumadin Clinic? No   Discharge Plan A Home   Discharge Plan B Home;Home with family   DME Needed Upon Discharge  other (see comments)  (TBD)   Patient/Family in Agreement with Plan yes

## 2020-12-08 NOTE — CARE UPDATE
At Mr. Brown's request I called and updated his wife Denice at 1443, going over the care of COVID patients in general and the specific plan for Mr. Brown going forward. All questions answered.    Gustavo Griffin M.D.  Department of Hospital Medicine  [facility]  [phone] (pager)

## 2020-12-08 NOTE — PLAN OF CARE
12/08/20 1702   Post-Acute Status   Post-Acute Authorization Other   Other Status No Post-Acute Service Needs   Discharge Delays None known at this time   Discharge Plan   Discharge Plan A Home   Discharge Plan B Home;Home with family     CM to follow for discharge planning needs.  DULCE DickersonN RN  Case Management  [phone]

## 2020-12-09 LAB
ALBUMIN SERPL BCP-MCNC: 3 G/DL (ref 3.5–5.2)
ALP SERPL-CCNC: 33 U/L (ref 55–135)
ALT SERPL W/O P-5'-P-CCNC: 45 U/L (ref 10–44)
ANION GAP SERPL CALC-SCNC: 9 MMOL/L (ref 8–16)
AST SERPL-CCNC: 37 U/L (ref 10–40)
BASOPHILS # BLD AUTO: 0 K/UL (ref 0–0.2)
BASOPHILS NFR BLD: 0 % (ref 0–1.9)
BILIRUB SERPL-MCNC: 0.3 MG/DL (ref 0.1–1)
BUN SERPL-MCNC: 14 MG/DL (ref 6–20)
CALCIUM SERPL-MCNC: 8.6 MG/DL (ref 8.7–10.5)
CHLORIDE SERPL-SCNC: 103 MMOL/L (ref 95–110)
CO2 SERPL-SCNC: 29 MMOL/L (ref 23–29)
CREAT SERPL-MCNC: 0.8 MG/DL (ref 0.5–1.4)
CRP SERPL-MCNC: 96.9 MG/L (ref 0–8.2)
D DIMER PPP IA.FEU-MCNC: 0.2 MG/L FEU
DIFFERENTIAL METHOD: ABNORMAL
EOSINOPHIL # BLD AUTO: 0 K/UL (ref 0–0.5)
EOSINOPHIL NFR BLD: 0 % (ref 0–8)
ERYTHROCYTE [DISTWIDTH] IN BLOOD BY AUTOMATED COUNT: 11.8 % (ref 11.5–14.5)
EST. GFR  (AFRICAN AMERICAN): >60 ML/MIN/1.73 M^2
EST. GFR  (NON AFRICAN AMERICAN): >60 ML/MIN/1.73 M^2
FERRITIN SERPL-MCNC: 1706 NG/ML (ref 20–300)
GLUCOSE SERPL-MCNC: 109 MG/DL (ref 70–110)
HCT VFR BLD AUTO: 38.2 % (ref 40–54)
HGB BLD-MCNC: 12 G/DL (ref 14–18)
IMM GRANULOCYTES # BLD AUTO: 0.02 K/UL (ref 0–0.04)
IMM GRANULOCYTES NFR BLD AUTO: 0.4 % (ref 0–0.5)
LDH SERPL L TO P-CCNC: 346 U/L (ref 110–260)
LYMPHOCYTES # BLD AUTO: 0.9 K/UL (ref 1–4.8)
LYMPHOCYTES NFR BLD: 17.5 % (ref 18–48)
MCH RBC QN AUTO: 31 PG (ref 27–31)
MCHC RBC AUTO-ENTMCNC: 31.4 G/DL (ref 32–36)
MCV RBC AUTO: 99 FL (ref 82–98)
MONOCYTES # BLD AUTO: 0.5 K/UL (ref 0.3–1)
MONOCYTES NFR BLD: 9.5 % (ref 4–15)
NEUTROPHILS # BLD AUTO: 3.6 K/UL (ref 1.8–7.7)
NEUTROPHILS NFR BLD: 72.6 % (ref 38–73)
NRBC BLD-RTO: 0 /100 WBC
PLATELET # BLD AUTO: 197 K/UL (ref 150–350)
PMV BLD AUTO: 10.5 FL (ref 9.2–12.9)
POCT GLUCOSE: 151 MG/DL (ref 70–110)
POCT GLUCOSE: 162 MG/DL (ref 70–110)
POCT GLUCOSE: 180 MG/DL (ref 70–110)
POCT GLUCOSE: 96 MG/DL (ref 70–110)
POTASSIUM SERPL-SCNC: 4.5 MMOL/L (ref 3.5–5.1)
PROT SERPL-MCNC: 6.6 G/DL (ref 6–8.4)
RBC # BLD AUTO: 3.87 M/UL (ref 4.6–6.2)
SODIUM SERPL-SCNC: 141 MMOL/L (ref 136–145)
WBC # BLD AUTO: 4.97 K/UL (ref 3.9–12.7)

## 2020-12-09 PROCEDURE — 99232 SBSQ HOSP IP/OBS MODERATE 35: CPT | Mod: ,,, | Performed by: INTERNAL MEDICINE

## 2020-12-09 PROCEDURE — 85379 FIBRIN DEGRADATION QUANT: CPT

## 2020-12-09 PROCEDURE — 36415 COLL VENOUS BLD VENIPUNCTURE: CPT

## 2020-12-09 PROCEDURE — 82728 ASSAY OF FERRITIN: CPT

## 2020-12-09 PROCEDURE — 94664 DEMO&/EVAL PT USE INHALER: CPT

## 2020-12-09 PROCEDURE — 85025 COMPLETE CBC W/AUTO DIFF WBC: CPT

## 2020-12-09 PROCEDURE — 27000221 HC OXYGEN, UP TO 24 HOURS

## 2020-12-09 PROCEDURE — 63600175 PHARM REV CODE 636 W HCPCS: Performed by: INTERNAL MEDICINE

## 2020-12-09 PROCEDURE — 25000003 PHARM REV CODE 250: Performed by: INTERNAL MEDICINE

## 2020-12-09 PROCEDURE — 94640 AIRWAY INHALATION TREATMENT: CPT

## 2020-12-09 PROCEDURE — 63700000 PHARM REV CODE 250 ALT 637 W/O HCPCS: Performed by: INTERNAL MEDICINE

## 2020-12-09 PROCEDURE — 99900035 HC TECH TIME PER 15 MIN (STAT)

## 2020-12-09 PROCEDURE — 80053 COMPREHEN METABOLIC PANEL: CPT

## 2020-12-09 PROCEDURE — 86140 C-REACTIVE PROTEIN: CPT

## 2020-12-09 PROCEDURE — 11000001 HC ACUTE MED/SURG PRIVATE ROOM

## 2020-12-09 PROCEDURE — 83615 LACTATE (LD) (LDH) ENZYME: CPT

## 2020-12-09 PROCEDURE — 94761 N-INVAS EAR/PLS OXIMETRY MLT: CPT

## 2020-12-09 PROCEDURE — 99232 PR SUBSEQUENT HOSPITAL CARE,LEVL II: ICD-10-PCS | Mod: ,,, | Performed by: INTERNAL MEDICINE

## 2020-12-09 RX ADMIN — IPRATROPIUM BROMIDE 2 PUFF: 17 AEROSOL, METERED RESPIRATORY (INHALATION) at 07:12

## 2020-12-09 RX ADMIN — ALBUTEROL SULFATE 2 PUFF: 90 AEROSOL, METERED RESPIRATORY (INHALATION) at 07:12

## 2020-12-09 RX ADMIN — ENOXAPARIN SODIUM 40 MG: 40 INJECTION SUBCUTANEOUS at 05:12

## 2020-12-09 RX ADMIN — IPRATROPIUM BROMIDE 2 PUFF: 17 AEROSOL, METERED RESPIRATORY (INHALATION) at 02:12

## 2020-12-09 RX ADMIN — MUPIROCIN: 20 OINTMENT TOPICAL at 08:12

## 2020-12-09 RX ADMIN — LOPERAMIDE HYDROCHLORIDE 2 MG: 2 CAPSULE ORAL at 08:12

## 2020-12-09 RX ADMIN — BENZONATATE 200 MG: 100 CAPSULE ORAL at 08:12

## 2020-12-09 RX ADMIN — BENZONATATE 200 MG: 100 CAPSULE ORAL at 11:12

## 2020-12-09 RX ADMIN — ALBUTEROL SULFATE 2 PUFF: 90 AEROSOL, METERED RESPIRATORY (INHALATION) at 02:12

## 2020-12-09 RX ADMIN — OXYCODONE HYDROCHLORIDE AND ACETAMINOPHEN 500 MG: 500 TABLET ORAL at 08:12

## 2020-12-09 RX ADMIN — IPRATROPIUM BROMIDE 2 PUFF: 17 AEROSOL, METERED RESPIRATORY (INHALATION) at 08:12

## 2020-12-09 RX ADMIN — CEFTRIAXONE 1 G: 1 INJECTION, POWDER, FOR SOLUTION INTRAMUSCULAR; INTRAVENOUS at 05:12

## 2020-12-09 RX ADMIN — THERA TABS 1 TABLET: TAB at 08:12

## 2020-12-09 RX ADMIN — DEXAMETHASONE 6 MG: 4 TABLET ORAL at 08:12

## 2020-12-09 RX ADMIN — ALBUTEROL SULFATE 2 PUFF: 90 AEROSOL, METERED RESPIRATORY (INHALATION) at 08:12

## 2020-12-09 RX ADMIN — REMDESIVIR 100 MG: 100 INJECTION, POWDER, LYOPHILIZED, FOR SOLUTION INTRAVENOUS at 05:12

## 2020-12-09 RX ADMIN — AZITHROMYCIN MONOHYDRATE 500 MG: 250 TABLET ORAL at 05:12

## 2020-12-09 RX ADMIN — LOSARTAN POTASSIUM 50 MG: 50 TABLET, FILM COATED ORAL at 08:12

## 2020-12-09 RX ADMIN — GUAIFENESIN AND DEXTROMETHORPHAN 10 ML: 100; 10 SYRUP ORAL at 09:12

## 2020-12-09 NOTE — RESEARCH
Date Consent signed: 12/8/2020  Sponsor:  [facility]    Study Title/IRB Number: [id number]    Principle Investigator: Dr Derrick Clement    Present for Discussion: Mr Brown, CRC Mirta An, CRC Fiona Wolf (witness)     Is LAR Consenting for Subject: no    Prior to the Informed Consent (IC) being signed, or any study protocol required data collection, testing, procedure, or intervention being performed, the following was done and/or discussed:   Patient was given a copy of the IC for review    Purpose of the study and qualifications to participate    Study design, Follow up schedule, and tests or procedures done at each visit   Confidentiality and HIPAA Authorization for Release of Medical Records for the research trial/ subject's rights/research related injury   Risk, Benefits, Alternative Treatments, Compensation and Costs   Participation in the research trial is voluntary and patient may withdraw at anytime   Contact information for study related questions    Patient verbalizes understanding of the above: Yes  Contact information for CRC and PI given to patient: Yes  Patient able to adequately summarize: the purpose of the study, the risks associated with the study, and all procedures, testing, and follow-ups associated with the study: Yes    Mr Brown signed the informed consent form for the [facility] research study.  Each page of the consent form was reviewed with him and all questions answered satisfactorily. He signed the consent form and received a copy of same. The original consent was scanned into electronic medical records (EPIC) and filed into the subject's research study binder.     This study randomizes patients between therapeutic dose heparin (enoxaparin preferred) and usual care (including prophylactic doses).  Dr Griffin, the attending, agrees that the patient is a good candidate for the study. He was randomized to usual care, so his enoxaparin dose doesn't need to change.      Please contact [phone] Mirta An with questions. Thanks!

## 2020-12-09 NOTE — PLAN OF CARE
SW assigned to case today 12/9/20. SW will assist team with DC needs. BRAYDEN in communication with CM.    Kaley Santacruz LMSW   - Case Management

## 2020-12-09 NOTE — PROGRESS NOTES
Hospital Medicine  Progress Note  [facility]      Patient Name: Jim Brown  MRN:  [id number]  Hospital Medicine Team: [facility] [place] HOSP MED G Gustavo Griffin MD  Date of Admission:  12/7/2020     Length of Stay:  LOS: 2 days       Principal Problem:  Pneumonia due to COVID-19 virus      HPI:    50-year-old man with past medical history of hypertension in BMI of 30 for  Patient reports to his baseline to approximately 5 days ago.  Reports having fever and worsening shortness of breath.  Reports that shortness of breath has worsened over the past couple of days.  Associated symptoms include decreased appetite, nausea without vomiting cough and body aches.  Reports he has a pulse ox at home and was getting O2 sats in the mid 80s with activity.  Reports that having fever as high it T-max of 104.3     Was seen in urgent care yesterday diagnosed with COVID.  Received 1 dose of Rocephin while he was there.  Was discharged with doxycycline but came to hospital before picking up prescription.  Reports feeling worse today so he came into the emergency room.    Hospital Course:    Jim Brown was admitted to Hospital Medicine for treatment of COVID-19 viral infection and was treated with supportive care following a comprehensive physical, radiographic, and lab evaluation tailored to the current standard of care for COVID19. Please review the admission H&P for details of his initial treatment. Though symptomatically he improved on dexamethasone and remdesivir, he remained dyspneic with exertion as limited as the walk to the bathroom in his room and required oxygen on hospital day 2. This was maintained as his exertional capacity slowly increased    Interval History:       Mr. Brown continues to improve in general, sitting in his recliner this morning. He has no new complaints, just cough which has improved and exertional dyspnea which persists. Diarrhea improved. No chest pain or edema. COVID-related  inflammatory markers mixed but generally trending down.    Review of Systems:    Respiratory: +cough, dyspnea  Cardiovascular: no chest pain, palpitations, lower extremity edema  GI: no abdominal pain, +diarrhea (improved)    Inpatient Medications:    Current Facility-Administered Medications:     acetaminophen tablet 650 mg, 650 mg, Oral, Q6H PRN, Nikunj Walker MD, 650 mg at 12/07/20 1755    albuterol inhaler 2 puff, 2 puff, Inhalation, Q6H, 2 puff at 12/09/20 0854 **AND** MDI Q6H, , , Q6H, Nikunj Walker MD    ascorbic acid (vitamin C) tablet 500 mg, 500 mg, Oral, BID, Nikunj Walker MD, 500 mg at 12/09/20 0822    azithromycin tablet 500 mg, 500 mg, Oral, Q24H, Nikunj Walker MD, 500 mg at 12/08/20 1719    benzonatate capsule 200 mg, 200 mg, Oral, TID PRN, Gustavo Griffin MD, 200 mg at 12/09/20 0823    cefTRIAXone injection 1 g, 1 g, Intravenous, Q24H, Nikunj Walker MD, 1 g at 12/08/20 1720    dexAMETHasone tablet 6 mg, 6 mg, Oral, Daily, Nikunj Walker MD, 6 mg at 12/09/20 0822    dextromethorphan-guaifenesin  mg/5 ml liquid 10 mL, 10 mL, Oral, Q4H PRN, Nikunj Walker MD, 10 mL at 12/09/20 0901    dextrose 50% injection 12.5 g, 12.5 g, Intravenous, PRN, Nikunj Walker MD    dextrose 50% injection 25 g, 25 g, Intravenous, PRN, Nikunj Walker MD    enoxaparin injection 40 mg, 40 mg, Subcutaneous, Q24H, Nikunj Walker MD, 40 mg at 12/08/20 1719    glucagon (human recombinant) injection 1 mg, 1 mg, Intramuscular, PRN, Nikunj Walker MD    glucose chewable tablet 16 g, 16 g, Oral, PRN, Nikunj Walker MD    glucose chewable tablet 24 g, 24 g, Oral, PRN, Nikunj Walker MD    influenza (QUADRIVALENT PF) vaccine 0.5 mL, 0.5 mL, Intramuscular, vaccine x 1 dose, Gutsavo Griffin MD    ipratropium inhaler 2 puff, 2 puff, Inhalation, Q6H, 2 puff at 12/09/20 0854 **AND** MDI Q6H, , , Q6H, Nikunj Walker MD    loperamide capsule 2 mg, 2 mg, Oral, Q6H PRN, Nikunj CELIS  "MD Denise, 2 mg at 12/09/20 0822    losartan tablet 50 mg, 50 mg, Oral, Daily, Gustavo Griffin MD, 50 mg at 12/09/20 0829    melatonin tablet 6 mg, 6 mg, Oral, Nightly PRN, Nikunj Walker MD    multivitamin tablet, 1 tablet, Oral, Daily, Nikunj Walker MD, 1 tablet at 12/09/20 0822    mupirocin 2 % ointment, , Nasal, BID, Gustavo Griffin MD    ondansetron disintegrating tablet 8 mg, 8 mg, Oral, Q8H PRN, Nikunj Walker MD    promethazine tablet 25 mg, 25 mg, Oral, Q6H PRN, Nikunj Walker MD    [COMPLETED] remdesivir 200 mg in sodium chloride 0.9% 250 mL infusion, 200 mg, Intravenous, Q24H, 200 mg at 12/07/20 1830 **FOLLOWED BY** remdesivir 100 mg in sodium chloride 0.9% 250 mL infusion, 100 mg, Intravenous, Q24H, Nikunj Walker MD, Last Rate: 250 mL/hr at 12/08/20 1600, 100 mg at 12/08/20 1600    sodium chloride 0.9% flush 10 mL, 10 mL, Intravenous, PRN, Nikunj Wakler MD      Physical Exam:      Intake/Output Summary (Last 24 hours) at 12/9/2020 1241  Last data filed at 12/9/2020 0745  Gross per 24 hour   Intake 950 ml   Output --   Net 950 ml     Wt Readings from Last 3 Encounters:   12/07/20 120.2 kg (264 lb 15.9 oz)   12/06/20 120.2 kg (265 lb)   07/26/18 120.3 kg (265 lb 3.4 oz)       /73 (BP Location: Right arm, Patient Position: Sitting)   Pulse 82   Temp 98 °F (36.7 °C) (Oral)   Resp (!) 23   Ht 6' 2" (1.88 m)   Wt 120.2 kg (264 lb 15.9 oz)   SpO2 (!) 93%   BMI 34.02 kg/m²     GEN: NAD, conversant  Resp: coarse bilateral breath sounds with bilateral crackles, normal work of breathing on 2L via NC  CV: RRR, no edema  GI: Overweight, soft, NTND  Neuro: AAOx3, CN grossly intact, no focal neurologic deficits    Laboratory:  Lab Results   Component Value Date    [id number] Positive (A) 12/06/2020       Recent Labs   Lab 12/07/20  1237 12/09/20  0303   WBC 4.11 4.97   LYMPH 12.2*  0.5* 17.5*  0.9*   HGB 12.8* 12.0*   HCT 39.4* 38.2*    197     Recent Labs   Lab " 12/07/20  1237 12/09/20  0303   * 141   K 3.7 4.5   CL 97 103   CO2 25 29   BUN 11 14   CREATININE 1.0 0.8   * 109   CALCIUM 8.5* 8.6*     Recent Labs   Lab 12/07/20  1237 12/08/20  0252 12/09/20  0303   ALKPHOS 38*  --  33*   ALT 38  --  45*   AST 37  --  37   ALBUMIN 3.4*  --  3.0*   PROT 7.2  --  6.6   BILITOT 0.6  --  0.3   INR  --  1.0  --         Recent Labs     12/07/20  1237 12/07/20  1639 12/08/20  0252 12/09/20  0303   DDIMER  --  0.26  0.26  --  0.20   FERRITIN 1,667*  --   --  1,706*   .6*  --   --  96.9*   *  --   --  346*   BNP  --  14  14  --   --    TROPONINI  --  <0.006  --   --    *  --  396*  --        All labs within the last 24 hours were reviewed.     Microbiology:  Microbiology Results (last 7 days)     Procedure Component Value Units Date/Time    Blood culture (site 1) [[id number]] Collected: 12/07/20 1638    Order Status: Completed Specimen: Blood from Peripheral, Antecubital, Left Updated: 12/08/20 1812     Blood Culture, Routine No Growth to date      No Growth to date    Narrative:      Site # 1, aerobic and anaerobic    Blood culture (site 2) [[id number]] Collected: 12/07/20 1638    Order Status: Completed Specimen: Blood from Peripheral, Hand, Right Updated: 12/08/20 1812     Blood Culture, Routine No Growth to date      No Growth to date    Narrative:      Site # 2, aerobic only            Imaging  ECG Results          EKG 12-lead (Final result)  Result time 12/07/20 13:08:21    Final result by Interface, Lab In [facility] (12/07/20 13:08:21)                 Narrative:    Test Reason : Z20.828,    Vent. Rate : 103 BPM     Atrial Rate : 103 BPM     P-R Int : 130 ms          QRS Dur : 092 ms      QT Int : 340 ms       P-R-T Axes : 053 028 -04 degrees     QTc Int : 445 ms    Sinus tachycardia  Nonspecific T wave abnormality Now present  Abnormal ECG  When compared with ECG of 14-AUG-2018 08:30,  Vent. rate has increased BY  39 BPM  Confirmed by PAKO MARISCAL,  CHIO ([id number]) on 12/7/2020 1:08:07 PM    Referred By: MALKA   SELF           Confirmed By:CHIO MIN MD                              No results found for this or any previous visit.    X-Ray Chest AP Portable  Narrative: EXAMINATION:  XR CHEST AP PORTABLE    CLINICAL HISTORY:  Suspected Covid-19 Virus Infection;    COMPARISON:  Comparison is made to 12/06/2020 at 15:02.  Clinical information of shortness of breath, with known positive COVID-19 status.    FINDINGS:  Heart size and the appearance of the cardiomediastinal silhouette have not changed appreciably since the examination referenced above.  Lung zones continue abnormal, with multifocal pulmonary parenchymal opacities representing patchy airspace consolidation and linear subsegmental atelectasis observed bilaterally, particularly in the peripheral lung zones.  This appearance is highly suggestive of COVID-19 pulmonary involvement.  The lung zones appear essentially stable when compared to the examination referenced above, however, and no significant new areas of airspace consolidation or volume loss have developed since that time.  No pleural fluid.  No pneumothorax.  Impression: Continued abnormal chest radiograph demonstrating findings strongly suggestive of pulmonary involvement with COVID-19, particularly given the patient's known positive status.  However, allowing for differences in projection and a poorer inspiratory depth level on the current examination, there has been no significant detrimental interval change in the appearance of the chest since 12/06/2020 at 15:02.    Electronically signed by: Jet Raygoza MD  Date:    12/07/2020  Time:    13:21      All imaging within the last 24 hours was reviewed.     Assessment and Plan:    Active Hospital Problems    Diagnosis  POA    *Pneumonia due to COVID-19 virus [U07.1, J12.89]  Yes      Resolved Hospital Problems   No resolved problems to display.       COVID-19 Virus Infection  Viral Pneumonia due  to COVID-19    - COVID-19 testing: Positive 12/6  - Isolation: Airborne/Droplet. Surgical mask on patient. Notify Infection Control  - Diagnostics: Trend Q48hrs if stable, more frequently if patient decompensating  - Management: Per [facility] COVID Treatment Protocol    - Telemetry & Pulse Oximetry Q4H    - Nutrition:        - Multivitamin PO daily       - Boost supplement       - Vitamin D level normal       - Ascorbic acid 500mg PO bid    - Supportive Care:       - acetaminophen 650mg PO Q6hr PRN fever/headache       - loperamide PRN viral diarrhea       - IVF if indicated, restrictive strategy preferred, no maintenance IV if able       - VTE PPx: enoxaparin     - Antibiotics:       - ceftriaxone 1g Q24h x 5 days       - azithromycin 500mg po x3 days (finishes today)     - Remdesivir started 12/7     - Dexamethasone started 12/7     - Convalescent Plasma not indicated    Acute Hypoxemic Respiratory Failure      - Stable    - Order RT consult via Respiratory Communication for COVID Protocols    - Incentive Spirometer Q4h, Flutter Valve Q4h    - Continuous Pulse Oximetry, goal SpO2 92-96%    - Supplemental O2 via LFNC, VentiMask, or HFNC (see Respiratory Support Oxygen Therapies)    - If wheezing, albuterol INH Q6h scheduled & PRN    - Proning Protocol if patient is a candidate (see  Proning Protocol)   - GCS >13, able to self-prone    - If deterioration, may warrant trial of NIPPV in neg pressure room or immediate ICU consult    Elevated CPK    - Improving, no longer trending    Essential hypertension    - Continue home losartan    Advance Care Planning  Goals of care, counseling/discussion    - Curative, full code    VTE High Risk Prophylaxis:   VTE Risk Mitigation (From admission, onward)         Ordered     enoxaparin injection 40 mg  Every 24 hours      12/07/20 1542     IP VTE HIGH RISK PATIENT  Once      12/07/20 1542     Place sequential compression device  Until discontinued      12/07/20 1542                 Subsequent Inpatient Hospital Care  Level 2 [id number] Total visit time was 25 minutes or greater with greater than 50% of time spent in counseling and coordination of care.     High Risk Conditions:  Patient has a condition that poses threat to life and bodily function: COVID19 virus infection with acute hypoxic respiratory failure    Gustavo Griffin M.D.  Department of Hospital Medicine  [facility]  [phone] (pager)

## 2020-12-10 LAB
ALBUMIN SERPL BCP-MCNC: 2.9 G/DL (ref 3.5–5.2)
ALP SERPL-CCNC: 34 U/L (ref 55–135)
ALT SERPL W/O P-5'-P-CCNC: 60 U/L (ref 10–44)
ANION GAP SERPL CALC-SCNC: 11 MMOL/L (ref 8–16)
AST SERPL-CCNC: 39 U/L (ref 10–40)
BILIRUB SERPL-MCNC: 0.4 MG/DL (ref 0.1–1)
BUN SERPL-MCNC: 15 MG/DL (ref 6–20)
CALCIUM SERPL-MCNC: 8.4 MG/DL (ref 8.7–10.5)
CHLORIDE SERPL-SCNC: 104 MMOL/L (ref 95–110)
CO2 SERPL-SCNC: 27 MMOL/L (ref 23–29)
CREAT SERPL-MCNC: 0.8 MG/DL (ref 0.5–1.4)
EST. GFR  (AFRICAN AMERICAN): >60 ML/MIN/1.73 M^2
EST. GFR  (NON AFRICAN AMERICAN): >60 ML/MIN/1.73 M^2
GLUCOSE SERPL-MCNC: 107 MG/DL (ref 70–110)
POCT GLUCOSE: 103 MG/DL (ref 70–110)
POCT GLUCOSE: 113 MG/DL (ref 70–110)
POCT GLUCOSE: 135 MG/DL (ref 70–110)
POCT GLUCOSE: 143 MG/DL (ref 70–110)
POCT GLUCOSE: 81 MG/DL (ref 70–110)
POTASSIUM SERPL-SCNC: 4.5 MMOL/L (ref 3.5–5.1)
PROT SERPL-MCNC: 6.4 G/DL (ref 6–8.4)
SODIUM SERPL-SCNC: 142 MMOL/L (ref 136–145)

## 2020-12-10 PROCEDURE — 80053 COMPREHEN METABOLIC PANEL: CPT

## 2020-12-10 PROCEDURE — 94640 AIRWAY INHALATION TREATMENT: CPT

## 2020-12-10 PROCEDURE — 11000001 HC ACUTE MED/SURG PRIVATE ROOM

## 2020-12-10 PROCEDURE — 63600175 PHARM REV CODE 636 W HCPCS: Performed by: INTERNAL MEDICINE

## 2020-12-10 PROCEDURE — 99232 SBSQ HOSP IP/OBS MODERATE 35: CPT | Mod: ,,, | Performed by: HOSPITALIST

## 2020-12-10 PROCEDURE — 36415 COLL VENOUS BLD VENIPUNCTURE: CPT

## 2020-12-10 PROCEDURE — 99900035 HC TECH TIME PER 15 MIN (STAT)

## 2020-12-10 PROCEDURE — 94761 N-INVAS EAR/PLS OXIMETRY MLT: CPT

## 2020-12-10 PROCEDURE — 99232 PR SUBSEQUENT HOSPITAL CARE,LEVL II: ICD-10-PCS | Mod: ,,, | Performed by: HOSPITALIST

## 2020-12-10 PROCEDURE — 27000221 HC OXYGEN, UP TO 24 HOURS

## 2020-12-10 PROCEDURE — 25000003 PHARM REV CODE 250: Performed by: INTERNAL MEDICINE

## 2020-12-10 RX ADMIN — DEXAMETHASONE 6 MG: 4 TABLET ORAL at 09:12

## 2020-12-10 RX ADMIN — OXYCODONE HYDROCHLORIDE AND ACETAMINOPHEN 500 MG: 500 TABLET ORAL at 09:12

## 2020-12-10 RX ADMIN — IPRATROPIUM BROMIDE 2 PUFF: 17 AEROSOL, METERED RESPIRATORY (INHALATION) at 09:12

## 2020-12-10 RX ADMIN — ENOXAPARIN SODIUM 40 MG: 40 INJECTION SUBCUTANEOUS at 05:12

## 2020-12-10 RX ADMIN — ALBUTEROL SULFATE 2 PUFF: 90 AEROSOL, METERED RESPIRATORY (INHALATION) at 09:12

## 2020-12-10 RX ADMIN — OXYCODONE HYDROCHLORIDE AND ACETAMINOPHEN 500 MG: 500 TABLET ORAL at 08:12

## 2020-12-10 RX ADMIN — REMDESIVIR 100 MG: 100 INJECTION, POWDER, LYOPHILIZED, FOR SOLUTION INTRAVENOUS at 05:12

## 2020-12-10 RX ADMIN — ALBUTEROL SULFATE 2 PUFF: 90 AEROSOL, METERED RESPIRATORY (INHALATION) at 01:12

## 2020-12-10 RX ADMIN — IPRATROPIUM BROMIDE 2 PUFF: 17 AEROSOL, METERED RESPIRATORY (INHALATION) at 07:12

## 2020-12-10 RX ADMIN — MUPIROCIN: 20 OINTMENT TOPICAL at 09:12

## 2020-12-10 RX ADMIN — BENZONATATE 200 MG: 100 CAPSULE ORAL at 09:12

## 2020-12-10 RX ADMIN — IPRATROPIUM BROMIDE 2 PUFF: 17 AEROSOL, METERED RESPIRATORY (INHALATION) at 01:12

## 2020-12-10 RX ADMIN — MUPIROCIN: 20 OINTMENT TOPICAL at 08:12

## 2020-12-10 RX ADMIN — ALBUTEROL SULFATE 2 PUFF: 90 AEROSOL, METERED RESPIRATORY (INHALATION) at 07:12

## 2020-12-10 RX ADMIN — THERA TABS 1 TABLET: TAB at 09:12

## 2020-12-10 RX ADMIN — LOSARTAN POTASSIUM 50 MG: 50 TABLET, FILM COATED ORAL at 09:12

## 2020-12-10 RX ADMIN — CEFTRIAXONE 1 G: 1 INJECTION, POWDER, FOR SOLUTION INTRAMUSCULAR; INTRAVENOUS at 05:12

## 2020-12-10 NOTE — CONSULTS
[facility] consulted for  Jim Davilaoskar to be followed through telemedicine modalities.    The patient is currently unable to be transferred due to census constraints.    Please re-consult.    Vida Spicer

## 2020-12-10 NOTE — PLAN OF CARE
Pt aaox4. Initially on 2L O2 satting 90% at rest. Now satting 95% on room air. Pt up in chair for meals. Consistently using IS with frequent position changes. Endorses SOB on ambulation. Denies CP, dizziness, fever, chills. BS WNL. Will continue to monitor.     Problem: Fall Injury Risk  Goal: Absence of Fall and Fall-Related Injury  Outcome: Ongoing, Progressing     Problem: Adult Inpatient Plan of Care  Goal: Plan of Care Review  Outcome: Ongoing, Progressing  Goal: Patient-Specific Goal (Individualization)  Outcome: Ongoing, Progressing  Goal: Absence of Hospital-Acquired Illness or Injury  Outcome: Ongoing, Progressing  Goal: Optimal Comfort and Wellbeing  Outcome: Ongoing, Progressing  Goal: Readiness for Transition of Care  Outcome: Ongoing, Progressing  Goal: Rounds/Family Conference  Outcome: Ongoing, Progressing     Problem: Fluid Imbalance (Pneumonia)  Goal: Fluid Balance  Outcome: Ongoing, Progressing     Problem: Infection (Pneumonia)  Goal: Resolution of Infection Signs/Symptoms  Outcome: Ongoing, Progressing     Problem: Respiratory Compromise (Pneumonia)  Goal: Effective Oxygenation and Ventilation  Outcome: Ongoing, Progressing

## 2020-12-10 NOTE — PLAN OF CARE
SW assigned to case today 12/10/20. SW will assist team with DC needs. BRAYDEN in communication with CM.    Kaley Santacruz LMSW   - Case Management

## 2020-12-10 NOTE — PLAN OF CARE
Pt aaox4. Currently on 2L NC. Attempted to wean pt but he drops to 87% when sitting in chair on room air. Pt continues to endorse SOB. Frequent IS use, frequent position changes. VSS. Will continue to monitor and reassess weaning off O2.     Problem: Fall Injury Risk  Goal: Absence of Fall and Fall-Related Injury  Outcome: Ongoing, Progressing     Problem: Adult Inpatient Plan of Care  Goal: Plan of Care Review  Outcome: Ongoing, Progressing  Goal: Patient-Specific Goal (Individualization)  Outcome: Ongoing, Progressing  Goal: Absence of Hospital-Acquired Illness or Injury  Outcome: Ongoing, Progressing  Goal: Optimal Comfort and Wellbeing  Outcome: Ongoing, Progressing  Goal: Readiness for Transition of Care  Outcome: Ongoing, Progressing  Goal: Rounds/Family Conference  Outcome: Ongoing, Progressing     Problem: Fluid Imbalance (Pneumonia)  Goal: Fluid Balance  Outcome: Ongoing, Progressing     Problem: Infection (Pneumonia)  Goal: Resolution of Infection Signs/Symptoms  Outcome: Ongoing, Progressing     Problem: Respiratory Compromise (Pneumonia)  Goal: Effective Oxygenation and Ventilation  Outcome: Ongoing, Progressing

## 2020-12-10 NOTE — PROGRESS NOTES
Hospital Medicine  Progress Note  [facility]      Patient Name: Jim Brown  MRN:  [id number]  Hospital Medicine [place]: [facility] AKANKSHA Tam MD  Date of Admission:  12/7/2020     Length of Stay:  LOS: 3 days       Principal Problem:  Pneumonia due to COVID-19 virus      HPI:    50-year-old man with past medical history of hypertension in BMI of 30 for  Patient reports to his baseline to approximately 5 days ago.  Reports having fever and worsening shortness of breath.  Reports that shortness of breath has worsened over the past couple of days.  Associated symptoms include decreased appetite, nausea without vomiting cough and body aches.  Reports he has a pulse ox at home and was getting O2 sats in the mid 80s with activity.  Reports that having fever as high it T-max of 104.3     Was seen in urgent care yesterday diagnosed with COVID.  Received 1 dose of Rocephin while he was there.  Was discharged with doxycycline but came to hospital before picking up prescription.  Reports feeling worse today so he came into the emergency room.    Hospital Course:    Jim Brown was admitted to Hospital Medicine for treatment of COVID-19 viral infection and was treated with supportive care following a comprehensive physical, radiographic, and lab evaluation tailored to the current standard of care for COVID19. Please review the admission H&P for details of his initial treatment. Though symptomatically he improved on dexamethasone and remdesivir, he remained dyspneic with exertion as limited as the walk to the bathroom in his room and required oxygen on hospital day 2. This was maintained as his exertional capacity slowly increased    Interval History:       Reports stable dyspnea associated with nonproductive cough worse with use of incentive spirometer and ambulation. Stable on 2 L NC, desats to 70% when ambulating to the bathroom.     Review of Systems:    Respiratory: +cough,  dyspnea  Cardiovascular: no chest pain, palpitations, lower extremity edema  GI: no abdominal pain, +diarrhea (improved)    Inpatient Medications:    Current Facility-Administered Medications:     acetaminophen tablet 650 mg, 650 mg, Oral, Q6H PRN, Nikunj Walker MD, 650 mg at 12/07/20 1755    albuterol inhaler 2 puff, 2 puff, Inhalation, Q6H, 2 puff at 12/10/20 1324 **AND** MDI Q6H, , , Q6H, Nikunj Walker MD    ascorbic acid (vitamin C) tablet 500 mg, 500 mg, Oral, BID, Nikunj Walker MD, 500 mg at 12/10/20 0911    benzonatate capsule 200 mg, 200 mg, Oral, TID PRN, Gustavo Griffin MD, 200 mg at 12/10/20 0911    cefTRIAXone injection 1 g, 1 g, Intravenous, Q24H, Nikunj Walker MD, 1 g at 12/09/20 1713    dexAMETHasone tablet 6 mg, 6 mg, Oral, Daily, Nikunj Walker MD, 6 mg at 12/10/20 0925    dextromethorphan-guaifenesin  mg/5 ml liquid 10 mL, 10 mL, Oral, Q4H PRN, Nikunj Walker MD, 10 mL at 12/09/20 0901    dextrose 50% injection 12.5 g, 12.5 g, Intravenous, PRN, Nikunj Walker MD    dextrose 50% injection 25 g, 25 g, Intravenous, PRN, Nikunj Walker MD    enoxaparin injection 40 mg, 40 mg, Subcutaneous, Q24H, Nikunj Walker MD, 40 mg at 12/09/20 1711    glucagon (human recombinant) injection 1 mg, 1 mg, Intramuscular, PRN, Nikunj Walker MD    glucose chewable tablet 16 g, 16 g, Oral, PRN, Nikunj Walker MD    glucose chewable tablet 24 g, 24 g, Oral, PRN, Nikunj Walker MD    influenza (QUADRIVALENT PF) vaccine 0.5 mL, 0.5 mL, Intramuscular, vaccine x 1 dose, Gustavo Griffin MD    ipratropium inhaler 2 puff, 2 puff, Inhalation, Q6H, 2 puff at 12/10/20 1324 **AND** MDI Q6H, , , Q6H, Nikunj Walker MD    loperamide capsule 2 mg, 2 mg, Oral, Q6H PRN, Nikunj Walker MD, Stopped at 12/10/20 0911    losartan tablet 50 mg, 50 mg, Oral, Daily, Gustavo Griffin MD, 50 mg at 12/10/20 0912    melatonin tablet 6 mg, 6 mg, Oral, Nightly PRN, Nikunj Walker MD     "multivitamin tablet, 1 tablet, Oral, Daily, Nikunj Walker MD, 1 tablet at 12/10/20 0911    mupirocin 2 % ointment, , Nasal, BID, Gustavo Griffin MD    ondansetron disintegrating tablet 8 mg, 8 mg, Oral, Q8H PRN, Nikunj Walker MD    promethazine tablet 25 mg, 25 mg, Oral, Q6H PRN, Nikunj Walker MD    [COMPLETED] remdesivir 200 mg in sodium chloride 0.9% 250 mL infusion, 200 mg, Intravenous, Q24H, 200 mg at 12/07/20 1830 **FOLLOWED BY** remdesivir 100 mg in sodium chloride 0.9% 250 mL infusion, 100 mg, Intravenous, Q24H, Nikunj Walker MD, Last Rate: 250 mL/hr at 12/09/20 1745, 100 mg at 12/09/20 1745    sodium chloride 0.9% flush 10 mL, 10 mL, Intravenous, PRN, Nikunj Walker MD      Physical Exam:      Intake/Output Summary (Last 24 hours) at 12/10/2020 1438  Last data filed at 12/10/2020 1200  Gross per 24 hour   Intake 425 ml   Output --   Net 425 ml     Wt Readings from Last 3 Encounters:   12/07/20 120.2 kg (264 lb 15.9 oz)   12/06/20 120.2 kg (265 lb)   07/26/18 120.3 kg (265 lb 3.4 oz)       /72 (BP Location: Right arm, Patient Position: Sitting)   Pulse 85   Temp 98.5 °F (36.9 °C) (Oral)   Resp (!) 24   Ht 6' 2" (1.88 m)   Wt 120.2 kg (264 lb 15.9 oz)   SpO2 (!) 92%   BMI 34.02 kg/m²     GEN: NAD, conversant  Resp: coarse bilateral breath sounds with bilateral crackles, normal work of breathing on 2L via NC  CV: RRR, no edema  GI: Overweight, soft, NTND  Neuro: AAOx3, CN grossly intact, no focal neurologic deficits    Laboratory:  Lab Results   Component Value Date    [id number] Positive (A) 12/06/2020       Recent Labs   Lab 12/07/20  1237 12/09/20  0303   WBC 4.11 4.97   LYMPH 12.2*  0.5* 17.5*  0.9*   HGB 12.8* 12.0*   HCT 39.4* 38.2*    197     Recent Labs   Lab 12/07/20  1237 12/09/20  0303 12/10/20  0319   * 141 142   K 3.7 4.5 4.5   CL 97 103 104   CO2 25 29 27   BUN 11 14 15   CREATININE 1.0 0.8 0.8   * 109 107   CALCIUM 8.5* 8.6* 8.4* "     Recent Labs   Lab 12/07/20  1237 12/08/20  0252 12/09/20  0303 12/10/20  0319   ALKPHOS 38*  --  33* 34*   ALT 38  --  45* 60*   AST 37  --  37 39   ALBUMIN 3.4*  --  3.0* 2.9*   PROT 7.2  --  6.6 6.4   BILITOT 0.6  --  0.3 0.4   INR  --  1.0  --   --         Recent Labs     12/07/20  1639 12/08/20  0252 12/09/20  0303   DDIMER 0.26  0.26  --  0.20   FERRITIN  --   --  1,706*   CRP  --   --  96.9*   LDH  --   --  346*   BNP 14  14  --   --    TROPONINI <0.006  --   --    CPK  --  396*  --        All labs within the last 24 hours were reviewed.     Microbiology:  Microbiology Results (last 7 days)     Procedure Component Value Units Date/Time    Blood culture (site 1) [[id number]] Collected: 12/07/20 1638    Order Status: Completed Specimen: Blood from Peripheral, Antecubital, Left Updated: 12/09/20 1812     Blood Culture, Routine No Growth to date      No Growth to date      No Growth to date    Narrative:      Site # 1, aerobic and anaerobic    Blood culture (site 2) [[id number]] Collected: 12/07/20 1638    Order Status: Completed Specimen: Blood from Peripheral, Hand, Right Updated: 12/09/20 1812     Blood Culture, Routine No Growth to date      No Growth to date      No Growth to date    Narrative:      Site # 2, aerobic only            Imaging  ECG Results          EKG 12-lead (Final result)  Result time 12/07/20 13:08:21    Final result by Interface, Lab In [facility] (12/07/20 13:08:21)                 Narrative:    Test Reason : Z20.828,    Vent. Rate : 103 BPM     Atrial Rate : 103 BPM     P-R Int : 130 ms          QRS Dur : 092 ms      QT Int : 340 ms       P-R-T Axes : 053 028 -04 degrees     QTc Int : 445 ms    Sinus tachycardia  Nonspecific T wave abnormality Now present  Abnormal ECG  When compared with ECG of 14-AUG-2018 08:30,  Vent. rate has increased BY  39 BPM  Confirmed by CHIO MIN MD ([id number]) on 12/7/2020 1:08:07 PM    Referred By: MALKA   SELF           Confirmed By:CHIO MIN MD                               No results found for this or any previous visit.    X-Ray Chest AP Portable  Narrative: EXAMINATION:  XR CHEST AP PORTABLE    CLINICAL HISTORY:  Suspected Covid-19 Virus Infection;    COMPARISON:  Comparison is made to 12/06/2020 at 15:02.  Clinical information of shortness of breath, with known positive COVID-19 status.    FINDINGS:  Heart size and the appearance of the cardiomediastinal silhouette have not changed appreciably since the examination referenced above.  Lung zones continue abnormal, with multifocal pulmonary parenchymal opacities representing patchy airspace consolidation and linear subsegmental atelectasis observed bilaterally, particularly in the peripheral lung zones.  This appearance is highly suggestive of COVID-19 pulmonary involvement.  The lung zones appear essentially stable when compared to the examination referenced above, however, and no significant new areas of airspace consolidation or volume loss have developed since that time.  No pleural fluid.  No pneumothorax.  Impression: Continued abnormal chest radiograph demonstrating findings strongly suggestive of pulmonary involvement with COVID-19, particularly given the patient's known positive status.  However, allowing for differences in projection and a poorer inspiratory depth level on the current examination, there has been no significant detrimental interval change in the appearance of the chest since 12/06/2020 at 15:02.    Electronically signed by: Jet Raygoza MD  Date:    12/07/2020  Time:    13:21      All imaging within the last 24 hours was reviewed.     Assessment and Plan:    Active Hospital Problems    Diagnosis  POA    *Pneumonia due to COVID-19 virus [U07.1, J12.89]  Yes      Resolved Hospital Problems   No resolved problems to display.       COVID-19 Virus Infection  Viral Pneumonia due to COVID-19    - COVID-19 testing: Positive 12/6  - Isolation: Airborne/Droplet. Surgical mask on patient.  Notify Infection Control  - Diagnostics: Trend Q48hrs if stable, more frequently if patient decompensating  - Management: Per [facility] COVID Treatment Protocol    - Telemetry & Pulse Oximetry Q4H    - Nutrition:        - Multivitamin PO daily       - Boost supplement       - Vitamin D level normal       - Ascorbic acid 500mg PO bid    - Supportive Care:       - acetaminophen 650mg PO Q6hr PRN fever/headache       - loperamide PRN viral diarrhea       - IVF if indicated, restrictive strategy preferred, no maintenance IV if able       - VTE PPx: enoxaparin     - Antibiotics:       - ceftriaxone 1g Q24h x 5 days       - azithromycin 500mg po x3 days (finishes today)     - Remdesivir started 12/7     - Dexamethasone started 12/7     - Convalescent Plasma not indicated  - ferritin stable, crp downtrending, ddimer wnl    Acute Hypoxemic Respiratory Failure      - Stable    - Order RT consult via Respiratory Communication for COVID Protocols    - Incentive Spirometer Q4h, Flutter Valve Q4h    - Continuous Pulse Oximetry, goal SpO2 92-96%    - Supplemental O2 via LFNC, VentiMask, or HFNC (see Respiratory Support Oxygen Therapies)    - If wheezing, albuterol INH Q6h scheduled & PRN    - Proning Protocol if patient is a candidate (see  Proning Protocol)   - GCS >13, able to self-prone    - If deterioration, may warrant trial of NIPPV in neg pressure room or immediate ICU consult    Elevated CPK    - Improving, no longer trending    Essential hypertension    - Continue home losartan    Advance Care Planning  Goals of care, counseling/discussion    - Curative, full code    VTE High Risk Prophylaxis:   VTE Risk Mitigation (From admission, onward)         Ordered     enoxaparin injection 40 mg  Every 24 hours      12/07/20 1542     IP VTE HIGH RISK PATIENT  Once      12/07/20 1542     Place sequential compression device  Until discontinued      12/07/20 1542                Subsequent Inpatient Hospital Care  Level 2 [id number] Total  visit time was 25 minutes or greater with greater than 50% of time spent in counseling and coordination of care.     High Risk Conditions:  Patient has a condition that poses threat to life and bodily function: COVID19 virus infection with acute hypoxic respiratory failure      Roberta Tam MD  [facility] [place] Medicine Staff  [facility]

## 2020-12-11 ENCOUNTER — TELEPHONE (OUTPATIENT)
Dept: INTERNAL MEDICINE | Facility: CLINIC | Age: 50
End: 2020-12-11

## 2020-12-11 VITALS
RESPIRATION RATE: 21 BRPM | HEART RATE: 77 BPM | HEIGHT: 74 IN | OXYGEN SATURATION: 94 % | DIASTOLIC BLOOD PRESSURE: 90 MMHG | WEIGHT: 265 LBS | TEMPERATURE: 98 F | SYSTOLIC BLOOD PRESSURE: 149 MMHG | BODY MASS INDEX: 34.01 KG/M2

## 2020-12-11 LAB
ALBUMIN SERPL BCP-MCNC: 3 G/DL (ref 3.5–5.2)
ALP SERPL-CCNC: 36 U/L (ref 55–135)
ALT SERPL W/O P-5'-P-CCNC: 60 U/L (ref 10–44)
ANION GAP SERPL CALC-SCNC: 14 MMOL/L (ref 8–16)
AST SERPL-CCNC: 33 U/L (ref 10–40)
BASOPHILS # BLD AUTO: 0 K/UL (ref 0–0.2)
BASOPHILS NFR BLD: 0 % (ref 0–1.9)
BILIRUB SERPL-MCNC: 0.4 MG/DL (ref 0.1–1)
BUN SERPL-MCNC: 15 MG/DL (ref 6–20)
CALCIUM SERPL-MCNC: 8.6 MG/DL (ref 8.7–10.5)
CHLORIDE SERPL-SCNC: 104 MMOL/L (ref 95–110)
CO2 SERPL-SCNC: 22 MMOL/L (ref 23–29)
CREAT SERPL-MCNC: 0.8 MG/DL (ref 0.5–1.4)
CRP SERPL-MCNC: 41.9 MG/L (ref 0–8.2)
D DIMER PPP IA.FEU-MCNC: <0.19 MG/L FEU
DIFFERENTIAL METHOD: ABNORMAL
EOSINOPHIL # BLD AUTO: 0 K/UL (ref 0–0.5)
EOSINOPHIL NFR BLD: 0 % (ref 0–8)
ERYTHROCYTE [DISTWIDTH] IN BLOOD BY AUTOMATED COUNT: 11.7 % (ref 11.5–14.5)
EST. GFR  (AFRICAN AMERICAN): >60 ML/MIN/1.73 M^2
EST. GFR  (NON AFRICAN AMERICAN): >60 ML/MIN/1.73 M^2
FERRITIN SERPL-MCNC: 1139 NG/ML (ref 20–300)
GLUCOSE SERPL-MCNC: 113 MG/DL (ref 70–110)
HCT VFR BLD AUTO: 38.3 % (ref 40–54)
HGB BLD-MCNC: 12.1 G/DL (ref 14–18)
IMM GRANULOCYTES # BLD AUTO: 0.07 K/UL (ref 0–0.04)
IMM GRANULOCYTES NFR BLD AUTO: 1.2 % (ref 0–0.5)
LDH SERPL L TO P-CCNC: 384 U/L (ref 110–260)
LYMPHOCYTES # BLD AUTO: 1.1 K/UL (ref 1–4.8)
LYMPHOCYTES NFR BLD: 19.2 % (ref 18–48)
MCH RBC QN AUTO: 30.6 PG (ref 27–31)
MCHC RBC AUTO-ENTMCNC: 31.6 G/DL (ref 32–36)
MCV RBC AUTO: 97 FL (ref 82–98)
MONOCYTES # BLD AUTO: 0.6 K/UL (ref 0.3–1)
MONOCYTES NFR BLD: 9.6 % (ref 4–15)
NEUTROPHILS # BLD AUTO: 4 K/UL (ref 1.8–7.7)
NEUTROPHILS NFR BLD: 70 % (ref 38–73)
NRBC BLD-RTO: 0 /100 WBC
PLATELET # BLD AUTO: 233 K/UL (ref 150–350)
PMV BLD AUTO: 9.7 FL (ref 9.2–12.9)
POCT GLUCOSE: 97 MG/DL (ref 70–110)
POTASSIUM SERPL-SCNC: 4.8 MMOL/L (ref 3.5–5.1)
PROT SERPL-MCNC: 6.5 G/DL (ref 6–8.4)
RBC # BLD AUTO: 3.96 M/UL (ref 4.6–6.2)
SODIUM SERPL-SCNC: 140 MMOL/L (ref 136–145)
WBC # BLD AUTO: 5.72 K/UL (ref 3.9–12.7)

## 2020-12-11 PROCEDURE — 25000003 PHARM REV CODE 250: Performed by: INTERNAL MEDICINE

## 2020-12-11 PROCEDURE — 36415 COLL VENOUS BLD VENIPUNCTURE: CPT

## 2020-12-11 PROCEDURE — 86140 C-REACTIVE PROTEIN: CPT

## 2020-12-11 PROCEDURE — U0003 INFECTIOUS AGENT DETECTION BY NUCLEIC ACID (DNA OR RNA); SEVERE ACUTE RESPIRATORY SYNDROME CORONAVIRUS 2 (SARS-COV-2) (CORONAVIRUS DISEASE [COVID-19]), AMPLIFIED PROBE TECHNIQUE, MAKING USE OF HIGH THROUGHPUT TECHNOLOGIES AS DESCRIBED BY CMS-2020-01-R: HCPCS

## 2020-12-11 PROCEDURE — 82728 ASSAY OF FERRITIN: CPT

## 2020-12-11 PROCEDURE — 99239 HOSP IP/OBS DSCHRG MGMT >30: CPT | Mod: ,,, | Performed by: HOSPITALIST

## 2020-12-11 PROCEDURE — 83615 LACTATE (LD) (LDH) ENZYME: CPT

## 2020-12-11 PROCEDURE — 85025 COMPLETE CBC W/AUTO DIFF WBC: CPT

## 2020-12-11 PROCEDURE — 63600175 PHARM REV CODE 636 W HCPCS: Performed by: INTERNAL MEDICINE

## 2020-12-11 PROCEDURE — 94640 AIRWAY INHALATION TREATMENT: CPT

## 2020-12-11 PROCEDURE — 99900035 HC TECH TIME PER 15 MIN (STAT)

## 2020-12-11 PROCEDURE — 85379 FIBRIN DEGRADATION QUANT: CPT

## 2020-12-11 PROCEDURE — 99239 PR HOSPITAL DISCHARGE DAY,>30 MIN: ICD-10-PCS | Mod: ,,, | Performed by: HOSPITALIST

## 2020-12-11 PROCEDURE — 80053 COMPREHEN METABOLIC PANEL: CPT

## 2020-12-11 PROCEDURE — 94761 N-INVAS EAR/PLS OXIMETRY MLT: CPT

## 2020-12-11 RX ORDER — GUAIFENESIN/DEXTROMETHORPHAN 100-10MG/5
10 SYRUP ORAL EVERY 4 HOURS PRN
Refills: 0 | COMMUNITY
Start: 2020-12-11 | End: 2020-12-21

## 2020-12-11 RX ORDER — LOSARTAN POTASSIUM 50 MG/1
50 TABLET ORAL DAILY
Qty: 90 TABLET | Refills: 3
Start: 2020-12-11 | End: 2021-01-25 | Stop reason: SDUPTHER

## 2020-12-11 RX ORDER — BENZONATATE 200 MG/1
200 CAPSULE ORAL 3 TIMES DAILY PRN
Qty: 30 CAPSULE | Refills: 0 | Status: SHIPPED | OUTPATIENT
Start: 2020-12-11 | End: 2020-12-21

## 2020-12-11 RX ORDER — ASCORBIC ACID 500 MG
500 TABLET ORAL 2 TIMES DAILY
COMMUNITY
Start: 2020-12-11

## 2020-12-11 RX ADMIN — REMDESIVIR 100 MG: 100 INJECTION, POWDER, LYOPHILIZED, FOR SOLUTION INTRAVENOUS at 04:12

## 2020-12-11 RX ADMIN — MUPIROCIN: 20 OINTMENT TOPICAL at 08:12

## 2020-12-11 RX ADMIN — ALBUTEROL SULFATE 2 PUFF: 90 AEROSOL, METERED RESPIRATORY (INHALATION) at 07:12

## 2020-12-11 RX ADMIN — ALBUTEROL SULFATE 2 PUFF: 90 AEROSOL, METERED RESPIRATORY (INHALATION) at 02:12

## 2020-12-11 RX ADMIN — OXYCODONE HYDROCHLORIDE AND ACETAMINOPHEN 500 MG: 500 TABLET ORAL at 08:12

## 2020-12-11 RX ADMIN — LOSARTAN POTASSIUM 50 MG: 50 TABLET, FILM COATED ORAL at 08:12

## 2020-12-11 RX ADMIN — ALBUTEROL SULFATE 2 PUFF: 90 AEROSOL, METERED RESPIRATORY (INHALATION) at 01:12

## 2020-12-11 RX ADMIN — IPRATROPIUM BROMIDE 2 PUFF: 17 AEROSOL, METERED RESPIRATORY (INHALATION) at 01:12

## 2020-12-11 RX ADMIN — IPRATROPIUM BROMIDE 2 PUFF: 17 AEROSOL, METERED RESPIRATORY (INHALATION) at 02:12

## 2020-12-11 RX ADMIN — IPRATROPIUM BROMIDE 2 PUFF: 17 AEROSOL, METERED RESPIRATORY (INHALATION) at 07:12

## 2020-12-11 RX ADMIN — THERA TABS 1 TABLET: TAB at 08:12

## 2020-12-11 RX ADMIN — DEXAMETHASONE 6 MG: 4 TABLET ORAL at 08:12

## 2020-12-11 NOTE — PLAN OF CARE
No Social Service needs identified at this time.  SW will continue to follow case status.     12/11/20 1129   Post-Acute Status   Post-Acute Authorization Other   Other Status No Post-Acute Service Needs     Claudia Rocha LMSW  PRN-  [facility]  Ext. [phone]

## 2020-12-11 NOTE — DISCHARGE INSTRUCTIONS
Please isolate yourself at home.  You may leave home and/or return to work once the following conditions are met:    If you were not hospitalized and are not severely immunocompromised*:   More than 10 days since symptoms first appeared AND   More than 24 hours fever free without medications AND   Symptoms have improved     If you were hospitalized OR are severely immunocompromised*:   More than 20 days since symptoms first appeared   More than 24 hours fever free without medications   Symptoms have improved    If you had no symptoms but tested positive:   More than 10 days since the date of the first positive test (20 days if severely immunocompromised).   If you develop symptoms, then use the guidelines above.     *Definition of severely immunocompromised:  - Current chemotherapy for cancer  - Untreated HIV with CD4 count less than 200  - Combined primary immunodeficiency disorder  - Prednisone more than 20 mg per day for more than 14 days  - Post-transplant patients    Additional instructions:   Separate yourself from other people and animals in your home.   Call ahead before visiting your doctor.   Wear a facemask when around others.   Cover your coughs and sneezes.   Wash your hands often with soap and water; hand  can be used, too.   Avoid sharing personal household items.   Wipe down surfaces used daily.   Monitor your symptoms. Seek prompt medical attention if your illness is worsening (e.g., difficulty breathing).    Before seeking care, call your healthcare provider.   If you have a medical emergency and need to call 911, notify the dispatch personnel that you have, or are being evaluated for COVID-19. If possible, put on a facemask before emergency medical services arrive.    [facility]  Preventing the Spread of Coronavirus Disease 2019 in Homes and Residential Communities      Prevention steps for people with confirmed or suspected COVID-19  (including persons under investigation) who do not need to be hospitalized and people with confirmed COVID-19 who were hospitalized and determined to be medically stable to go home.    Your healthcare provider and public health staff will evaluate whether you can be cared for at home.   Stay home except to get medical care.   Separate yourself from other people and animals in your home   Call ahead before visiting your doctor.   Wear a facemask.   Cover your coughs and sneezes.   Clean your hands often.   Avoid sharing personal household items.   Clean all high-touch surfaces every day.   Monitor your symptoms. Seek prompt medical attention if your illness is worsening (e.g., difficulty breathing). Before seeking care, call your healthcare provider.   If you have a medical emergency and need to call 911, notify the dispatch personnel that you   have, or are being evaluated for COVID-19. If possible, put on a facemask before emergency   medical services arrive.   Discontinuing home isolation. Call your provider about guidance to discontinue home isolation.    Recommended precautions for household members, intimate partners, and caregivers in a nonhealthcare setting of a patient with symptomatic laboratory-confirmed COVID-19 or a patient under investigation.  Household members, intimate partners, and caregivers in a nonhealthcare setting may have close  contact with a person with symptomatic, laboratory-confirmed COVID-19 or a person under  investigation. Close contacts should monitor their health; they should call their healthcare provider right  away if they develop symptoms suggestive of COVID-19 (e.g., fever, cough, shortness of breath).    Close contacts should also follow these recommendations:   Make sure that you understand and can help the patient follow their healthcare provider's instructions for medication(s) and care. You should help the patient with basic needs in the home and provide  support for getting groceries, prescriptions, and other personal needs.   Monitor the patient's symptoms. If the patient is getting sicker, call his or her healthcare provider and tell them that the patient has laboratory-confirmed COVID-19. This will help the healthcare provider's office take steps to keep other people in the office or waiting room from getting infected. Ask the healthcare provider to call the local or [facility] health department for additional guidance. If the patient has a medical emergency and you need to call 911, notify the dispatch personnel that the patient has, or is being evaluated for COVID-19.   Household members should stay in another room or be  from the patient as much as possible. Household members should use a separate bedroom and bathroom, if available.   Prohibit visitors who do not have an essential need to be in the home.   Household members should care for any pets in the home. Do not handle pets or other animals while sick.   Make sure that shared spaces in the home have good air flow, such as by an air conditioner or an opened window, weather permitting.   Perform hand hygiene frequently. Wash your hands often with soap and water for at least 20 seconds or use an alcohol-based hand  that contains 60 to 95% alcohol, covering all surfaces of your hands and rubbing them together until they feel dry. Soap and water should be used preferentially if hands are visibly dirty.   Avoid touching your eyes, nose, and mouth with unwashed hands.   The patient should wear a facemask when you are around other people. If the patient is not able to wear a facemask (for example, because it causes trouble breathing), you, as the caregiver should wear a mask when you are in the same room as the patient.   Wear a disposable facemask and gloves when you touch or have contact with the patient's blood, stool, or body fluids, such as saliva, sputum, nasal mucus, vomit,  urine.  o Throw out disposable facemasks and gloves after using them. Do not reuse.  o When removing personal protective equipment, first remove and dispose of gloves. Then, immediately clean your hands with soap and water or alcohol-based hand . Next, remove and dispose of facemask, and immediately clean your hands again with soap and water or alcohol-based hand .   Avoid sharing household items with the patient. You should not share dishes, drinking glasses, cups, eating utensils, towels, bedding, or other items. After the patient uses these items, you should wash them thoroughly (see below Wash laundry thoroughly).   Clean all high-touch surfaces, such as counters, tabletops, doorknobs, bathroom fixtures, toilets, phones, keyboards, tablets, and bedside tables, every day. Also, clean any surfaces that may have blood, stool, or body fluids on them.   Use a household cleaning spray or wipe, according to the label instructions. Labels contain instructions for safe and effective use of the cleaning product including precautions you should take when applying the product, such as wearing gloves and making sure you have good ventilation during use of the product.   Wash laundry thoroughly.  o Immediately remove and wash clothes or bedding that have blood, stool, or body fluids on them.  o Wear disposable gloves while handling soiled items and keep soiled items away from your body. Clean your hands (with soap and water or an alcohol-based hand ) immediately after removing your gloves.  o Read and follow directions on labels of laundry or clothing items and detergent. In general, using a normal laundry detergent according to washing machine instructions and dry thoroughly using the warmest temperatures recommended on the clothing label.   Place all used disposable gloves, facemasks, and other contaminated items in a lined container before disposing of them with other household waste.  Clean your hands (with soap and water or an alcohol-based hand ) immediately after handling these items. Soap and water should be used preferentially if hands are visibly dirty.   Discuss any additional questions with your state or local health department or healthcare provider. Check available hours when contacting your local health department.      -------------------------------------------------------------------------------------------------------------------------------------------        Instructions for Home Care of Patients and Caretakers with Coronavirus Disease 2019     Patients will be given one mask to take home with them if having symptoms of fever, cough, shortness of breath, and within 6 feet of others.   Limit visitors to the home.  Older persons and those that have chronic medical conditions such as diabetes, lung and heart disease are at increased risk for illness.    If possible, patients should use a separate bedroom while recovering. Caregivers and household members should avoid prolonged contact with the patient which means to stay 6 feet away and avoid contact with cough droplets.  When close contact is necessary, wash your hands before and immediately after contact.    Perform hand hygiene frequently. Wash your hands often with soap and water for at least 20 seconds or use an alcohol-based hand , covering all surfaces of your hands and rubbing them together until they feel dry.    Avoid touching your eyes, nose, and mouth with unwashed hands.   Avoid sharing household items with the patient. You should not share dishes, drinking glasses, cups, eating utensils, towels, bedding, or other items. After the patient uses these items, you should wash them thoroughly.   Wash laundry thoroughly.   o Immediately remove and wash clothes or bedding that have blood, stool, or body fluids on them.   Clean all high-touch surfaces, such as counters, tabletops, doorknobs,  bathroom fixtures, toilets, phones, keyboards, tablets, and bedside tables, every day.   o Use a household cleaning spray or wipe, according to the label instructions. Labels contain instructions for safe and effective use of the cleaning product including precautions you should take when applying the product, such as wearing gloves and making sure you have good ventilation during use of the product.    For more information see CDC link below.      https://www.cdc.gov/coronavirus/2019-ncov/hcp/guidance-prevent-spread.html#precautions

## 2020-12-11 NOTE — PROGRESS NOTES
Home Oxygen Evaluation    Date Performed: 2020    1) Patient's Home O2 Sat on room air, while at rest: 93%        If O2 sats on room air at rest are 88% or below, patient qualifies. No additional testing needed. Document N/A in steps 2 and 3. If 89% or above, complete steps 2.      2) Patient's O2 Sat on room air while exercisin%        If O2 sats on room air while exercising remain 89% or above patient does not qualify, no further testing needed Document N/A in step 3. If O2 sats on room air while exercising are 88% or below, continue to step 3.      3) Patient's O2 Sat while exercising on O2: 90% at 1 LPM         (Must show improvement from #2 for patients to qualify)    If O2 sats improve on oxygen, patient qualifies for portable oxygen. If not, the patient does not qualify.

## 2020-12-11 NOTE — PLAN OF CARE
POC reviewed with pt who verbalized understanding. AAOx4. VSS on 1L NC. Remains free of falls and injury. Up independently to bathroom. BG monitored at bedtime with no coverage needed. No complaints overnight. Resting between care. No acute events. No distress noted. Will continue to monitor.

## 2020-12-11 NOTE — DISCHARGE SUMMARY
Discharge Summary  Hospital Medicine  [facility]      Attending Physician on Discharge: Roberta Tam MD  [place] Medicine [place]: [facility]  Date of Admission:  12/7/2020     Date of Discharge:      Active Hospital Problems    Diagnosis  POA    *Pneumonia due to COVID-19 virus [U07.1, J12.89]  Yes      Resolved Hospital Problems   No resolved problems to display.          History of Present Illness:  50-year-old man with past medical history of hypertension in BMI of 30 for  Patient reports to his baseline to approximately 5 days ago.  Reports having fever and worsening shortness of breath.  Reports that shortness of breath has worsened over the past couple of days.  Associated symptoms include decreased appetite, nausea without vomiting cough and body aches.  Reports he has a pulse ox at home and was getting O2 sats in the mid 80s with activity.  Reports that having fever as high it T-max of 104.3     Was seen in urgent care yesterday diagnosed with COVID.  Received 1 dose of Rocephin while he was there.  Was discharged with doxycycline but came to hospital before picking up prescription.  Reports feeling worse today so he came into the emergency room.    Hospital Course:     Jim Brown was admitted to [facility] for treatment of suspected COVID-19 viral infection and was treated with supportive care following a comprehensive physical, radiographic, and lab evaluation tailored to the current standard of care for COVID19. Please review the admission H&P and the studies listed below for details. He improved with supportive care, dexamethasone, and remdesivir and was found to be suitable for discharge home with oxygen (1 L with exertion only). Inflammatory markers are improving. He was advised to monitor his blood pressure daily and to hold his home losartan if SBP < 120.     On the day of discharge, isolation precautions were reviewed at length verbally. The patient was  also provided with written isolation guidelines modified from the [place] Department [facility] and [facility]als as well as the CDC, as part of discharge paperwork. An updated phone number was obtained, which will be used to contact the patient when results are available. He was enrolled in the COVID surveillance program.     Laboratory Values:  Lab Results   Component Value Date    [id number] Positive (A) 12/06/2020       Recent Labs   Lab 12/07/20  1237 12/09/20  0303 12/11/20 0256   WBC 4.11 4.97 5.72   LYMPH 12.2*  0.5* 17.5*  0.9* 19.2  1.1   HGB 12.8* 12.0* 12.1*   HCT 39.4* 38.2* 38.3*    197 233     Recent Labs   Lab 12/09/20  0303 12/10/20  0319 12/11/20  0256    142 140   K 4.5 4.5 4.8    104 104   CO2 29 27 22*   BUN 14 15 15   CREATININE 0.8 0.8 0.8    107 113*   CALCIUM 8.6* 8.4* 8.6*     Recent Labs   Lab 12/08/20  0252 12/09/20  0303 12/10/20  0319 12/11/20  0256   ALKPHOS  --  33* 34* 36*   ALT  --  45* 60* 60*   AST  --  37 39 33   ALBUMIN  --  3.0* 2.9* 3.0*   PROT  --  6.6 6.4 6.5   BILITOT  --  0.3 0.4 0.4   INR 1.0  --   --   --         Recent Labs     12/09/20 0303 12/11/20 0256   DDIMER 0.20 <0.19   FERRITIN 1,706* 1,139*   CRP 96.9* 41.9*   * 384*         Microbiology:  Microbiology Results (last 7 days)     Procedure Component Value Units Date/Time    Blood culture (site 2) [[id number]] Collected: 12/07/20 1638    Order Status: Completed Specimen: Blood from Peripheral, Hand, Right Updated: 12/10/20 1812     Blood Culture, Routine No Growth to date      No Growth to date      No Growth to date      No Growth to date    Narrative:      Site # 2, aerobic only    Blood culture (site 1) [[id number]] Collected: 12/07/20 1638    Order Status: Completed Specimen: Blood from Peripheral, Antecubital, Left Updated: 12/10/20 1812     Blood Culture, Routine No Growth to date      No Growth to date      No Growth to date      No Growth to date    Narrative:      Site  # 1, aerobic and anaerobic          Imaging:  Imaging Results          X-Ray Chest AP Portable (Final result)  Result time 12/07/20 13:21:32    Final result by Jet Raygoza MD (12/07/20 13:21:32)                 Impression:      Continued abnormal chest radiograph demonstrating findings strongly suggestive of pulmonary involvement with COVID-19, particularly given the patient's known positive status.  However, allowing for differences in projection and a poorer inspiratory depth level on the current examination, there has been no significant detrimental interval change in the appearance of the chest since 12/06/2020 at 15:02.      Electronically signed by: Jet Raygoza MD  Date:    12/07/2020  Time:    13:21             Narrative:    EXAMINATION:  XR CHEST AP PORTABLE    CLINICAL HISTORY:  Suspected Covid-19 Virus Infection;    COMPARISON:  Comparison is made to 12/06/2020 at 15:02.  Clinical information of shortness of breath, with known positive COVID-19 status.    FINDINGS:  Heart size and the appearance of the cardiomediastinal silhouette have not changed appreciably since the examination referenced above.  Lung zones continue abnormal, with multifocal pulmonary parenchymal opacities representing patchy airspace consolidation and linear subsegmental atelectasis observed bilaterally, particularly in the peripheral lung zones.  This appearance is highly suggestive of COVID-19 pulmonary involvement.  The lung zones appear essentially stable when compared to the examination referenced above, however, and no significant new areas of airspace consolidation or volume loss have developed since that time.  No pleural fluid.  No pneumothorax.                                Cardiac:  ECG Results          EKG 12-lead (Final result)  Result time 12/07/20 13:08:21    Final result by Interface, Lab In [facility] (12/07/20 13:08:21)                 Narrative:    Test Reason : Z20.828,    Vent. Rate : 103 BPM     Atrial Rate : 103  BPM     P-R Int : 130 ms          QRS Dur : 092 ms      QT Int : 340 ms       P-R-T Axes : 053 028 -04 degrees     QTc Int : 445 ms    Sinus tachycardia  Nonspecific T wave abnormality Now present  Abnormal ECG  When compared with ECG of 14-AUG-2018 08:30,  Vent. rate has increased BY  39 BPM  Confirmed by CHIO MIN MD ([id number]) on 12/7/2020 1:08:07 PM    Referred By: [email or web address]   SELF           Confirmed By:CHIO MIN MD                              No results found for this or any previous visit.      Procedures:   * No surgery found *        Current Discharge Medication List      START taking these medications    Details   ascorbic acid, vitamin C, (VITAMIN C) 500 MG tablet Take 1 tablet (500 mg total) by mouth 2 (two) times daily.  Qty:        benzonatate (TESSALON) 200 MG capsule Take 1 capsule (200 mg total) by mouth 3 (three) times daily as needed for Cough.  Qty: 30 capsule, Refills: 0      dextromethorphan-guaifenesin  mg/5 ml (ROBITUSSIN-DM)  mg/5 mL liquid Take 10 mLs by mouth every 4 (four) hours as needed.  Qty:  , Refills: 0      multivitamin Tab Take 1 tablet by mouth once daily.  Qty: 30 tablet, Refills: 11         CONTINUE these medications which have CHANGED    Details   losartan (COZAAR) 50 MG tablet Take 1 tablet (50 mg total) by mouth once daily. HOLD FOR BLOOD PRESSURE LESS THAN 120  Qty: 90 tablet, Refills: 3    Comments: .         CONTINUE these medications which have NOT CHANGED    Details   albuterol (VENTOLIN HFA) 90 mcg/actuation inhaler Inhale 2 puffs into the lungs every 6 (six) hours as needed for Wheezing. Rescue  Qty: 18 g, Refills: 0    Associated Diagnoses: Shortness of breath; COVID-19 virus infection      pulse oximeter (PULSE OXIMETER) device by Apply Externally route 2 (two) times a day. Use twice daily at 8 AM and 3 PM and record the value in [employer] as directed.  Qty: 1 each, Refills: 0    Comments: This is a NO CHARGE item.  Please override price to zero.   DO NOT PRINT.  NORMAL MODE e-PRESCRIBE ONLY.  Associated Diagnoses: Shortness of breath; COVID-19 virus infection      ondansetron (ZOFRAN) 4 MG tablet Take 1 tablet (4 mg total) by mouth daily as needed for Nausea.  Qty: 30 tablet, Refills: 0    Associated Diagnoses: Nausea         STOP taking these medications       celecoxib (CELEBREX) 200 MG capsule Comments:   Reason for Stopping:         doxycycline (VIBRA-TABS) 100 MG tablet Comments:   Reason for Stopping:                 Discharge Diet:cardiac diet with Normal Fluid intake of 1500 - 2000 mL per day    Activity: activity as tolerated    Discharge Condition: Good    Disposition: Home or Self Care    Follow up:    Follow-up Information     Cynthia Myers MD. Schedule an appointment as soon as possible for a visit on 12/16/2020.    Specialty: Internal Medicine  Why: Hospital Follow Up - appointmnet Wed Dec 16 at 10:00am  Contact information:  [address]  [facility] [address]  [phone]                   Tests pending at the time of discharge:  Repeat COVID test, per request of patient's wife.        Time spent  on the discharge of the patient including review of hospital course with the patient. reviewing discharge medications and arranging follow-up care: 32 mins    Discharge examination: Patient was seen and examined on the date of discharge and determined to be suitable for discharge.        Roberta Tam MD  Hospital Medicine Staff  [facility]

## 2020-12-12 ENCOUNTER — PATIENT MESSAGE (OUTPATIENT)
Dept: ADMINISTRATIVE | Facility: OTHER | Age: 50
End: 2020-12-12

## 2020-12-12 ENCOUNTER — NURSE TRIAGE (OUTPATIENT)
Dept: ADMINISTRATIVE | Facility: CLINIC | Age: 50
End: 2020-12-12

## 2020-12-12 ENCOUNTER — TELEPHONE (OUTPATIENT)
Dept: INTERNAL MEDICINE | Facility: CLINIC | Age: 50
End: 2020-12-12

## 2020-12-12 LAB
BACTERIA BLD CULT: NORMAL
BACTERIA BLD CULT: NORMAL

## 2020-12-12 NOTE — TELEPHONE ENCOUNTER
Please change his December 16th appt to virtual  Make sure he checks in 24 hours ahead in case of any problems.  Please make sure he is checking his BP, temp and pulse ox daily.  Schedule an office visit in about 3 weeks.

## 2020-12-12 NOTE — PLAN OF CARE
Pt aox4. Plan for Dc home with O2. Currently on room air. Desats with ambulation. Covid swabbed prior to DC. Verbalized understanding of rx, follow-up, supplies. Demonstrated how to use O2 machine. Plan for DC after remdesivir admin. VSS.       Problem: Fall Injury Risk  Goal: Absence of Fall and Fall-Related Injury  Outcome: Ongoing, Progressing     Problem: Adult Inpatient Plan of Care  Goal: Plan of Care Review  Outcome: Ongoing, Progressing  Goal: Patient-Specific Goal (Individualization)  Outcome: Ongoing, Progressing  Goal: Absence of Hospital-Acquired Illness or Injury  Outcome: Ongoing, Progressing  Goal: Optimal Comfort and Wellbeing  Outcome: Ongoing, Progressing  Goal: Readiness for Transition of Care  Outcome: Ongoing, Progressing  Goal: Rounds/Family Conference  Outcome: Ongoing, Progressing     Problem: Fluid Imbalance (Pneumonia)  Goal: Fluid Balance  Outcome: Ongoing, Progressing     Problem: Infection (Pneumonia)  Goal: Resolution of Infection Signs/Symptoms  Outcome: Ongoing, Progressing     Problem: Respiratory Compromise (Pneumonia)  Goal: Effective Oxygenation and Ventilation  Outcome: Ongoing, Progressing

## 2020-12-12 NOTE — TELEPHONE ENCOUNTER
"Pt escalated for O2 93%, on retest  O2 95%.  Pt denies any fever symptoms. Pt rates SOB at rest 1/5. Pt states 'I'm fine." No further action at this time.    Reason for Disposition   Information only question and nurse able to answer    Additional Information   Negative: Nursing judgment   Negative: Nursing judgment   Negative: Nursing judgment   Negative: Nursing judgment    Protocols used: INFORMATION ONLY CALL - NO TRIAGE-A-OH      "

## 2020-12-13 ENCOUNTER — PATIENT MESSAGE (OUTPATIENT)
Dept: ADMINISTRATIVE | Facility: OTHER | Age: 50
End: 2020-12-13

## 2020-12-13 ENCOUNTER — NURSE TRIAGE (OUTPATIENT)
Dept: ADMINISTRATIVE | Facility: CLINIC | Age: 50
End: 2020-12-13

## 2020-12-13 NOTE — TELEPHONE ENCOUNTER
"1055 Call placed to [phone]: COVID-19 surveillance program patient; Patient initially escalated due to SpO2 94% and answering "yes" to ,  however patient did not answer follow-up call; 1st attempt to call no answer; no contact; verified phone number; LM on VM to call for further assistance [phone] or call 911 or go to nearest ER in an emergency situation.    1058 2nd attempt: call placed to [phone] (EC: Denice Davilaoskar); patient currently in shower and asked that we call back in 10 minutes.    1111 3rd attempt: call placed to [phone]: COVID-19 surveillance program patient; patient's identity verified with name and ; took new vitals during phone call new vital signs and symptoms (see below) did not trigger a second escalation and patient states that he has not run a temp higher than 99 since yesterday's entry therefore, no further triage is needed at this time; patient expressed frustration with surveillance program because we call when he is ok and stated that we are "doing to much"; offered symptoms monitoring program and explained that we would only call if he answers the text for a callback; patient request to be switched to symptoms monitoring program; advised to continue monitoring his VS so he can be aware of his status; Advised to call back if further assistance is needed; with chest pains, 93% SpO2 or lower, or difficulty breathing to call 911 or go to the nearest ED, wear a mask and call ahead to alert them of COVID-19 status Patient verbalizes understanding and agrees to follow care advice given.    Sp02: 96  Pulse: 108 (patient had just taken a bath)  Temperature: 99.2  SOB at rest (0-5): 1  SOB with activity (0-5): 1  Worsening symptoms: no  Fever symptoms: no    Task removed, [id number] ordered, and encounter routed to ELDA Myers MD, GM Tam MC, and Renuka Ramirez with update.    Reason for Disposition   Health Information question, no triage required and triager able to answer " question    Protocols used: INFORMATION ONLY CALL - NO TRIAGE-A-AH

## 2020-12-14 LAB — SARS-COV-2 RNA RESP QL NAA+PROBE: DETECTED

## 2020-12-15 NOTE — PLAN OF CARE
Patient discharged home with family.  Home oxygen set up completed - (O)HME.  CM scheduled post hospital follow up appointment with Dr. ELDA Myers.    Future Appointments   Date Time Provider Department [facility]   12/16/2020 10:00 AM Cynthia Myers MD [facility]      CM to follow for discharge planning needs.  DULCE DickersonN RN  Case Management  [id number]        12/15/20 1602   Final Note   Assessment Type Final Discharge Note   Anticipated Discharge Disposition Home   Hospital Follow Up  Appt(s) scheduled? Yes   Discharge plans and expectations educations in teach back method with documentation complete? Yes  (provided by bedside nurse)   Post-Acute Status   Post-Acute Authorization Other;HME   HME Status Set-up Complete   Other Status No Post-Acute Service Needs   Discharge Delays None known at this time

## 2020-12-16 ENCOUNTER — OFFICE VISIT (OUTPATIENT)
Dept: INTERNAL MEDICINE | Facility: CLINIC | Age: 50
End: 2020-12-16
Payer: COMMERCIAL

## 2020-12-16 DIAGNOSIS — U07.1 COVID-19: ICD-10-CM

## 2020-12-16 DIAGNOSIS — Z09 HOSPITAL DISCHARGE FOLLOW-UP: Primary | ICD-10-CM

## 2020-12-16 PROCEDURE — 99214 OFFICE O/P EST MOD 30 MIN: CPT | Mod: 95,,, | Performed by: INTERNAL MEDICINE

## 2020-12-16 PROCEDURE — 99214 PR OFFICE/OUTPT VISIT, EST, LEVL IV, 30-39 MIN: ICD-10-PCS | Mod: 95,,, | Performed by: INTERNAL MEDICINE

## 2020-12-17 NOTE — PROGRESS NOTES
Subjective:      Patient ID: Jim Brown is a 50 y.o. male.    Chief Complaint: Follow-up    HPI:  HPI   The patient location is: hospital follow up  The chief complaint leading to consultation is: Covid    Visit type: audiovisual    Face to Face time with patient:   20 minutes of total time spent on the encounter, which includes face to face time and non-face to face time preparing to see the patient (eg, review of tests), Obtaining and/or reviewing separately obtained history, Documenting clinical information in the electronic or other health record, Independently interpreting results (not separately reported) and communicating results to the patient/family/caregiver, or Care coordination (not separately reported).         Each patient to whom he or she provides medical services by telemedicine is:  (1) informed of the relationship between the physician and patient and the respective role of any other health care provider with respect to management of the patient; and (2) notified that he or she may decline to receive medical services by telemedicine and may withdraw from such care at any time.    Notes:  Patient's hospitalization was reviewed.  He was sent home with 2 L of oxygen and is currently using it at bedtime but not during the day.  The patient states that when his oxygen is on his pulse ox is approximately 97%.  Without oxygen a goes down to approximately 93 %.  He continues with a cough and has used his inhaler.  He has no history of asthma or lung disease.  He does have test salon [facility] and should use over-the-counter Robitussin but has not been.    He does not have a blood pressure monitor but has continued on his losartan 50 mg    He is attempting to get out an at least do a short walk.  It is now 10 days post his diagnosis      Patient Active Problem List   Diagnosis    Hypertension    Foot pain: Dr. Simon    Hyperlipidemia    Decreased platelet count    Screening for colon cancer    BMI  34.0-34.9,adult    Pneumonia due to COVID-19 virus     No past medical history on file.  Past Surgical History:   Procedure Laterality Date    COLONOSCOPY  8/10/15    TONSILLECTOMY       Family History   Problem Relation Age of Onset    Cancer Paternal Grandmother         colon cancer     Review of Systems   See above no abdominal pain nausea vomiting diarrhea  Objective:   There were no vitals filed for this visit.  There is no height or weight on file to calculate BMI.  Physical Exam   Patient appears somewhat weaker than normal.  He is clear of thought  Assessment:     1. Hospital discharge follow-up    2. COVID-19      Plan:   Jim was seen today for follow-up.    Diagnoses and all orders for this visit:    Hospital discharge follow-up    COVID-19     I have discussed with the patient that I would like him to get a blood pressure monitor and be able to monitor his blood pressure so I made it just does medication.  At this time I have suggested that he use his oxygen 24 hr a day at least the next several days until he is more comfortable.  He should use medications to suppress the cough.  If he wishes to take a walk he does not need to take the oxygen.  He states that he is eating well.  He still feels quite fatigued    I have asked him to call me on Monday to report how he is doing    Problem List Items Addressed This Visit     None      Visit Diagnoses     Hospital discharge follow-up    -  Primary    COVID-19            No orders of the defined types were placed in this encounter.    No follow-ups on file.     Medication List          Accurate as of December 16, 2020  7:58 PM. If you have any questions, ask your nurse or doctor.            CONTINUE taking these medications    albuterol 90 mcg/actuation inhaler  Commonly known as: VENTOLIN HFA  Inhale 2 puffs into the lungs every 6 (six) hours as needed for Wheezing. Rescue     ascorbic acid (vitamin C) 500 MG tablet  Commonly known as: VITAMIN C  Take 1  tablet (500 mg total) by mouth 2 (two) times daily.     benzonatate 200 MG capsule  Commonly known as: TESSALON  Take 1 capsule (200 mg total) by mouth 3 (three) times daily as needed for Cough.     dextromethorphan-guaifenesin  mg/5 ml  mg/5 mL liquid  Commonly known as: ROBITUSSIN-DM  Take 10 mLs by mouth every 4 (four) hours as needed.     losartan 50 MG tablet  Commonly known as: COZAAR  Take 1 tablet (50 mg total) by mouth once daily. HOLD FOR BLOOD PRESSURE LESS THAN 120     ondansetron 4 MG tablet  Commonly known as: ZOFRAN  Take 1 tablet (4 mg total) by mouth daily as needed for Nausea.     pulse oximeter device  Commonly known as: pulse oximeter  by Apply Externally route 2 (two) times a day. Use twice daily at 8 AM and 3 PM and record the value in [employer] as directed.     THERA-TABS tablet  Generic drug: multivitamin  Take 1 tablet by mouth once daily.

## 2020-12-30 ENCOUNTER — LAB VISIT (OUTPATIENT)
Dept: LAB | Facility: HOSPITAL | Age: 50
End: 2020-12-30
Attending: INTERNAL MEDICINE
Payer: COMMERCIAL

## 2020-12-30 ENCOUNTER — OFFICE VISIT (OUTPATIENT)
Dept: INTERNAL MEDICINE | Facility: CLINIC | Age: 50
End: 2020-12-30
Payer: COMMERCIAL

## 2020-12-30 VITALS
OXYGEN SATURATION: 98 % | DIASTOLIC BLOOD PRESSURE: 80 MMHG | WEIGHT: 260.81 LBS | HEIGHT: 74 IN | HEART RATE: 88 BPM | SYSTOLIC BLOOD PRESSURE: 122 MMHG | BODY MASS INDEX: 33.47 KG/M2

## 2020-12-30 DIAGNOSIS — Z86.16 HISTORY OF 2019 NOVEL CORONAVIRUS DISEASE (COVID-19): Primary | ICD-10-CM

## 2020-12-30 DIAGNOSIS — Z86.16 HISTORY OF 2019 NOVEL CORONAVIRUS DISEASE (COVID-19): ICD-10-CM

## 2020-12-30 LAB
ALBUMIN SERPL BCP-MCNC: 3.9 G/DL (ref 3.5–5.2)
ALP SERPL-CCNC: 42 U/L (ref 55–135)
ALT SERPL W/O P-5'-P-CCNC: 35 U/L (ref 10–44)
ANION GAP SERPL CALC-SCNC: 11 MMOL/L (ref 8–16)
AST SERPL-CCNC: 21 U/L (ref 10–40)
BASOPHILS # BLD AUTO: 0.01 K/UL (ref 0–0.2)
BASOPHILS NFR BLD: 0.2 % (ref 0–1.9)
BILIRUB SERPL-MCNC: 0.3 MG/DL (ref 0.1–1)
BUN SERPL-MCNC: 14 MG/DL (ref 6–20)
CALCIUM SERPL-MCNC: 9.5 MG/DL (ref 8.7–10.5)
CHLORIDE SERPL-SCNC: 105 MMOL/L (ref 95–110)
CO2 SERPL-SCNC: 24 MMOL/L (ref 23–29)
CREAT SERPL-MCNC: 0.9 MG/DL (ref 0.5–1.4)
CRP SERPL-MCNC: 2.7 MG/L (ref 0–8.2)
DIFFERENTIAL METHOD: ABNORMAL
EOSINOPHIL # BLD AUTO: 0.2 K/UL (ref 0–0.5)
EOSINOPHIL NFR BLD: 3.2 % (ref 0–8)
ERYTHROCYTE [DISTWIDTH] IN BLOOD BY AUTOMATED COUNT: 11.9 % (ref 11.5–14.5)
EST. GFR  (AFRICAN AMERICAN): >60 ML/MIN/1.73 M^2
EST. GFR  (NON AFRICAN AMERICAN): >60 ML/MIN/1.73 M^2
FERRITIN SERPL-MCNC: 557 NG/ML (ref 20–300)
GLUCOSE SERPL-MCNC: 96 MG/DL (ref 70–110)
HCT VFR BLD AUTO: 42.2 % (ref 40–54)
HGB BLD-MCNC: 13.2 G/DL (ref 14–18)
IMM GRANULOCYTES # BLD AUTO: 0.03 K/UL (ref 0–0.04)
IMM GRANULOCYTES NFR BLD AUTO: 0.5 % (ref 0–0.5)
LDH SERPL L TO P-CCNC: 213 U/L (ref 110–260)
LYMPHOCYTES # BLD AUTO: 1.8 K/UL (ref 1–4.8)
LYMPHOCYTES NFR BLD: 29.1 % (ref 18–48)
MCH RBC QN AUTO: 30.5 PG (ref 27–31)
MCHC RBC AUTO-ENTMCNC: 31.3 G/DL (ref 32–36)
MCV RBC AUTO: 98 FL (ref 82–98)
MONOCYTES # BLD AUTO: 0.6 K/UL (ref 0.3–1)
MONOCYTES NFR BLD: 9.1 % (ref 4–15)
NEUTROPHILS # BLD AUTO: 3.5 K/UL (ref 1.8–7.7)
NEUTROPHILS NFR BLD: 57.9 % (ref 38–73)
NRBC BLD-RTO: 0 /100 WBC
PLATELET # BLD AUTO: 120 K/UL (ref 150–350)
PMV BLD AUTO: 12.7 FL (ref 9.2–12.9)
POTASSIUM SERPL-SCNC: 4.4 MMOL/L (ref 3.5–5.1)
PROT SERPL-MCNC: 7.4 G/DL (ref 6–8.4)
RBC # BLD AUTO: 4.33 M/UL (ref 4.6–6.2)
SODIUM SERPL-SCNC: 140 MMOL/L (ref 136–145)
WBC # BLD AUTO: 6.02 K/UL (ref 3.9–12.7)

## 2020-12-30 PROCEDURE — 3079F DIAST BP 80-89 MM HG: CPT | Mod: CPTII,S$GLB,, | Performed by: INTERNAL MEDICINE

## 2020-12-30 PROCEDURE — 3079F PR MOST RECENT DIASTOLIC BLOOD PRESSURE 80-89 MM HG: ICD-10-PCS | Mod: CPTII,S$GLB,, | Performed by: INTERNAL MEDICINE

## 2020-12-30 PROCEDURE — 82728 ASSAY OF FERRITIN: CPT

## 2020-12-30 PROCEDURE — 3074F PR MOST RECENT SYSTOLIC BLOOD PRESSURE < 130 MM HG: ICD-10-PCS | Mod: CPTII,S$GLB,, | Performed by: INTERNAL MEDICINE

## 2020-12-30 PROCEDURE — 1126F PR PAIN SEVERITY QUANTIFIED, NO PAIN PRESENT: ICD-10-PCS | Mod: S$GLB,,, | Performed by: INTERNAL MEDICINE

## 2020-12-30 PROCEDURE — 1126F AMNT PAIN NOTED NONE PRSNT: CPT | Mod: S$GLB,,, | Performed by: INTERNAL MEDICINE

## 2020-12-30 PROCEDURE — 99214 OFFICE O/P EST MOD 30 MIN: CPT | Mod: S$GLB,,, | Performed by: INTERNAL MEDICINE

## 2020-12-30 PROCEDURE — 86140 C-REACTIVE PROTEIN: CPT

## 2020-12-30 PROCEDURE — 83615 LACTATE (LD) (LDH) ENZYME: CPT

## 2020-12-30 PROCEDURE — 99999 PR PBB SHADOW E&M-EST. PATIENT-LVL III: CPT | Mod: PBBFAC,,, | Performed by: INTERNAL MEDICINE

## 2020-12-30 PROCEDURE — 36415 COLL VENOUS BLD VENIPUNCTURE: CPT

## 2020-12-30 PROCEDURE — 99999 PR PBB SHADOW E&M-EST. PATIENT-LVL III: ICD-10-PCS | Mod: PBBFAC,,, | Performed by: INTERNAL MEDICINE

## 2020-12-30 PROCEDURE — 3074F SYST BP LT 130 MM HG: CPT | Mod: CPTII,S$GLB,, | Performed by: INTERNAL MEDICINE

## 2020-12-30 PROCEDURE — 85025 COMPLETE CBC W/AUTO DIFF WBC: CPT

## 2020-12-30 PROCEDURE — 80053 COMPREHEN METABOLIC PANEL: CPT

## 2020-12-30 PROCEDURE — 3008F PR BODY MASS INDEX (BMI) DOCUMENTED: ICD-10-PCS | Mod: CPTII,S$GLB,, | Performed by: INTERNAL MEDICINE

## 2020-12-30 PROCEDURE — 99214 PR OFFICE/OUTPT VISIT, EST, LEVL IV, 30-39 MIN: ICD-10-PCS | Mod: S$GLB,,, | Performed by: INTERNAL MEDICINE

## 2020-12-30 PROCEDURE — 3008F BODY MASS INDEX DOCD: CPT | Mod: CPTII,S$GLB,, | Performed by: INTERNAL MEDICINE

## 2020-12-30 NOTE — PROGRESS NOTES
"Subjective:      Patient ID: Jim Brown is a 50 y.o. male.    Chief Complaint: Follow-up    HPI:  HPI   Discharged from hospital on 12/11/2020:  Having been admitted for COVID-19 with decreasing pulse ox.  He received both REM de severe and methotrexate.  He has lost weight but is eating.  He has been able to return to work.  His stamina is not what it was prior to the COVID but it is improving.  We did discussed general health issues.  His markers at discharge was still quite high.  If they have gone down the patient wishes to know whether he can stop the oxygen which she is paying out of pocket for.  Patient Active Problem List   Diagnosis    Hypertension    Foot pain: Dr. Simon    Hyperlipidemia    Decreased platelet count    Screening for colon cancer    BMI 34.0-34.9,adult    Pneumonia due to COVID-19 virus     No past medical history on file.  Past Surgical History:   Procedure Laterality Date    COLONOSCOPY  8/10/15    TONSILLECTOMY       Family History   Problem Relation Age of Onset    Cancer Paternal Grandmother         colon cancer     Review of Systems   Constitutional: Positive for fatigue. Negative for appetite change, chills, fever and unexpected weight change.   Respiratory: Positive for shortness of breath. Negative for wheezing.    Cardiovascular: Negative for chest pain, palpitations and leg swelling.   Gastrointestinal: Negative for abdominal pain, blood in stool, diarrhea, nausea and vomiting.     Objective:     Vitals:    12/30/20 1144   BP: 122/80   Pulse: 88   SpO2: 98%   Weight: 118.3 kg (260 lb 12.9 oz)   Height: 6' 2" (1.88 m)   PainSc: 0-No pain     Body mass index is 33.49 kg/m².  Physical Exam  Constitutional:       General: He is not in acute distress.     Appearance: He is well-developed.   Neck:      Thyroid: No thyromegaly.      Vascular: No carotid bruit.   Cardiovascular:      Rate and Rhythm: Normal rate and regular rhythm.      Chest Wall: PMI is not displaced. "      Heart sounds: Normal heart sounds.   Pulmonary:      Effort: Pulmonary effort is normal. No respiratory distress.      Breath sounds: Normal breath sounds.   Abdominal:      General: Bowel sounds are normal. There is no distension.      Palpations: Abdomen is soft.      Tenderness: There is no abdominal tenderness.   Neurological:      Mental Status: He is alert and oriented to person, place, and time.       Assessment:     1. History of 2019 novel coronavirus disease (COVID-19)      Plan:   Jim was seen today for follow-up.    Diagnoses and all orders for this visit:    History of 2019 novel coronavirus disease (COVID-19)  -     Ferritin; Future  -     Lactate Dehydrogenase; Future  -     C-Reactive Protein; Future  -     CBC Auto Differential; Future  -     Comprehensive Metabolic Panel; Future      Will check markers follow-up in 1 month and if doing well patient may return the oxygen  Problem List Items Addressed This Visit     None      Visit Diagnoses     History of 2019 novel coronavirus disease (COVID-19)    -  Primary    Relevant Orders    Ferritin    Lactate Dehydrogenase    C-Reactive Protein    CBC Auto Differential    Comprehensive Metabolic Panel        Orders Placed This Encounter   Procedures    Ferritin     Standing Status:   Future     Standing Expiration Date:   2/28/2022    Lactate Dehydrogenase     Standing Status:   Future     Standing Expiration Date:   2/28/2022    C-Reactive Protein     Standing Status:   Future     Standing Expiration Date:   12/30/2021    CBC Auto Differential     Standing Status:   Future     Standing Expiration Date:   2/28/2021    Comprehensive Metabolic Panel     Standing Status:   Future     Standing Expiration Date:   2/28/2021     No follow-ups on file.     Medication List          Accurate as of December 30, 2020 12:22 PM. If you have any questions, ask your nurse or doctor.            CONTINUE taking these medications    albuterol 90 mcg/actuation  inhaler  Commonly known as: VENTOLIN HFA  Inhale 2 puffs into the lungs every 6 (six) hours as needed for Wheezing. Rescue     ascorbic acid (vitamin C) 500 MG tablet  Commonly known as: VITAMIN C  Take 1 tablet (500 mg total) by mouth 2 (two) times daily.     losartan 50 MG tablet  Commonly known as: COZAAR  Take 1 tablet (50 mg total) by mouth once daily. HOLD FOR BLOOD PRESSURE LESS THAN 120     ondansetron 4 MG tablet  Commonly known as: ZOFRAN  Take 1 tablet (4 mg total) by mouth daily as needed for Nausea.     pulse oximeter device  Commonly known as: pulse oximeter  by Apply Externally route 2 (two) times a day. Use twice daily at 8 AM and 3 PM and record the value in [employer] as directed.     THERA-TABS tablet  Generic drug: multivitamin  Take 1 tablet by mouth once daily.

## 2021-01-05 ENCOUNTER — RESEARCH ENCOUNTER (OUTPATIENT)
Dept: RESEARCH | Facility: HOSPITAL | Age: 51
End: 2021-01-05

## 2021-01-23 ENCOUNTER — PATIENT MESSAGE (OUTPATIENT)
Dept: INTERNAL MEDICINE | Facility: CLINIC | Age: 51
End: 2021-01-23

## 2021-01-25 RX ORDER — LOSARTAN POTASSIUM 50 MG/1
50 TABLET ORAL DAILY
Qty: 90 TABLET | Refills: 3
Start: 2021-01-25 | End: 2021-01-27 | Stop reason: SDUPTHER

## 2021-02-01 ENCOUNTER — HOSPITAL ENCOUNTER (OUTPATIENT)
Dept: RADIOLOGY | Facility: HOSPITAL | Age: 51
Discharge: HOME | End: 2021-02-01
Attending: INTERNAL MEDICINE
Payer: COMMERCIAL

## 2021-02-01 ENCOUNTER — OFFICE VISIT (OUTPATIENT)
Dept: INTERNAL MEDICINE | Facility: CLINIC | Age: 51
End: 2021-02-01
Payer: COMMERCIAL

## 2021-02-01 VITALS
DIASTOLIC BLOOD PRESSURE: 82 MMHG | OXYGEN SATURATION: 97 % | BODY MASS INDEX: 34.4 KG/M2 | SYSTOLIC BLOOD PRESSURE: 130 MMHG | HEIGHT: 74 IN | WEIGHT: 268.06 LBS | HEART RATE: 75 BPM

## 2021-02-01 DIAGNOSIS — R53.83 FATIGUE, UNSPECIFIED TYPE: ICD-10-CM

## 2021-02-01 DIAGNOSIS — Z86.16 HISTORY OF COVID-19: Primary | ICD-10-CM

## 2021-02-01 DIAGNOSIS — D69.6 DECREASED PLATELET COUNT: ICD-10-CM

## 2021-02-01 DIAGNOSIS — I10 ESSENTIAL HYPERTENSION: ICD-10-CM

## 2021-02-01 DIAGNOSIS — Z86.16 HISTORY OF COVID-19: ICD-10-CM

## 2021-02-01 PROCEDURE — 3075F SYST BP GE 130 - 139MM HG: CPT | Mod: CPTII,S$GLB,, | Performed by: INTERNAL MEDICINE

## 2021-02-01 PROCEDURE — 71046 X-RAY EXAM CHEST 2 VIEWS: CPT | Mod: 26,,, | Performed by: RADIOLOGY

## 2021-02-01 PROCEDURE — 99999 PR PBB SHADOW E&M-EST. PATIENT-LVL III: ICD-10-PCS | Mod: PBBFAC,,, | Performed by: INTERNAL MEDICINE

## 2021-02-01 PROCEDURE — 99214 PR OFFICE/OUTPT VISIT, EST, LEVL IV, 30-39 MIN: ICD-10-PCS | Mod: S$GLB,,, | Performed by: INTERNAL MEDICINE

## 2021-02-01 PROCEDURE — 1126F AMNT PAIN NOTED NONE PRSNT: CPT | Mod: S$GLB,,, | Performed by: INTERNAL MEDICINE

## 2021-02-01 PROCEDURE — 3008F BODY MASS INDEX DOCD: CPT | Mod: CPTII,S$GLB,, | Performed by: INTERNAL MEDICINE

## 2021-02-01 PROCEDURE — 3008F PR BODY MASS INDEX (BMI) DOCUMENTED: ICD-10-PCS | Mod: CPTII,S$GLB,, | Performed by: INTERNAL MEDICINE

## 2021-02-01 PROCEDURE — 99214 OFFICE O/P EST MOD 30 MIN: CPT | Mod: S$GLB,,, | Performed by: INTERNAL MEDICINE

## 2021-02-01 PROCEDURE — 1126F PR PAIN SEVERITY QUANTIFIED, NO PAIN PRESENT: ICD-10-PCS | Mod: S$GLB,,, | Performed by: INTERNAL MEDICINE

## 2021-02-01 PROCEDURE — 3075F PR MOST RECENT SYSTOLIC BLOOD PRESS GE 130-139MM HG: ICD-10-PCS | Mod: CPTII,S$GLB,, | Performed by: INTERNAL MEDICINE

## 2021-02-01 PROCEDURE — 99999 PR PBB SHADOW E&M-EST. PATIENT-LVL III: CPT | Mod: PBBFAC,,, | Performed by: INTERNAL MEDICINE

## 2021-02-01 PROCEDURE — 3079F DIAST BP 80-89 MM HG: CPT | Mod: CPTII,S$GLB,, | Performed by: INTERNAL MEDICINE

## 2021-02-01 PROCEDURE — 71046 X-RAY EXAM CHEST 2 VIEWS: CPT | Mod: TC

## 2021-02-01 PROCEDURE — 71046 XR CHEST PA AND LATERAL: ICD-10-PCS | Mod: 26,,, | Performed by: RADIOLOGY

## 2021-02-01 PROCEDURE — 3079F PR MOST RECENT DIASTOLIC BLOOD PRESSURE 80-89 MM HG: ICD-10-PCS | Mod: CPTII,S$GLB,, | Performed by: INTERNAL MEDICINE

## 2021-02-01 RX ORDER — LOSARTAN POTASSIUM 50 MG/1
50 TABLET ORAL DAILY
Qty: 90 TABLET | Refills: 3 | Status: SHIPPED | OUTPATIENT
Start: 2021-02-01 | End: 2022-01-26

## 2021-02-04 ENCOUNTER — HOSPITAL ENCOUNTER (OUTPATIENT)
Dept: CARDIOLOGY | Facility: HOSPITAL | Age: 51
Discharge: HOME | End: 2021-02-04
Attending: INTERNAL MEDICINE
Payer: COMMERCIAL

## 2021-02-04 VITALS
HEART RATE: 71 BPM | DIASTOLIC BLOOD PRESSURE: 90 MMHG | WEIGHT: 268 LBS | SYSTOLIC BLOOD PRESSURE: 142 MMHG | HEIGHT: 74 IN | BODY MASS INDEX: 34.39 KG/M2

## 2021-02-04 DIAGNOSIS — I10 ESSENTIAL HYPERTENSION: ICD-10-CM

## 2021-02-04 DIAGNOSIS — R53.83 FATIGUE, UNSPECIFIED TYPE: ICD-10-CM

## 2021-02-04 DIAGNOSIS — Z86.16 HISTORY OF COVID-19: ICD-10-CM

## 2021-02-04 LAB
ASCENDING AORTA: 3.86 CM
AV INDEX (PROSTH): 0.93
AV MEAN GRADIENT: 5 MMHG
AV PEAK GRADIENT: 9 MMHG
AV VALVE AREA: 4.37 CM2
AV VELOCITY RATIO: 0.82
BSA FOR ECHO PROCEDURE: 2.52 M2
CV ECHO LV RWT: 0.31 CM
DOP CALC AO PEAK VEL: 1.48 M/S
DOP CALC AO VTI: 28.1 CM
DOP CALC LVOT AREA: 4.7 CM2
DOP CALC LVOT DIAMETER: 2.45 CM
DOP CALC LVOT PEAK VEL: 1.21 M/S
DOP CALC LVOT STROKE VOLUME: 122.89 CM3
DOP CALCLVOT PEAK VEL VTI: 26.08 CM
E WAVE DECELERATION TIME: 217.7 MSEC
E/A RATIO: 1.19
E/E' RATIO: 6.82 M/S
ECHO LV POSTERIOR WALL: 0.83 CM (ref 0.6–1.1)
FRACTIONAL SHORTENING: 38 % (ref 28–44)
INTERVENTRICULAR SEPTUM: 0.88 CM (ref 0.6–1.1)
IVRT: 91.34 MSEC
LA MAJOR: 5.64 CM
LA MINOR: 5.54 CM
LA WIDTH: 4.65 CM
LEFT ATRIUM SIZE: 3.98 CM
LEFT ATRIUM VOLUME INDEX MOD: 33.4 ML/M2
LEFT ATRIUM VOLUME INDEX: 35.7 ML/M2
LEFT ATRIUM VOLUME MOD: 82.06 CM3
LEFT ATRIUM VOLUME: 87.93 CM3
LEFT INTERNAL DIMENSION IN SYSTOLE: 3.34 CM (ref 2.1–4)
LEFT VENTRICLE DIASTOLIC VOLUME INDEX: 56.44 ML/M2
LEFT VENTRICLE DIASTOLIC VOLUME: 138.84 ML
LEFT VENTRICLE MASS INDEX: 68 G/M2
LEFT VENTRICLE SYSTOLIC VOLUME INDEX: 18.5 ML/M2
LEFT VENTRICLE SYSTOLIC VOLUME: 45.51 ML
LEFT VENTRICULAR INTERNAL DIMENSION IN DIASTOLE: 5.36 CM (ref 3.5–6)
LEFT VENTRICULAR MASS: 166.5 G
LV LATERAL E/E' RATIO: 5 M/S
LV SEPTAL E/E' RATIO: 10.71 M/S
MV PEAK A VEL: 0.63 M/S
MV PEAK E VEL: 0.75 M/S
PISA TR MAX VEL: 2.6 M/S
PULM VEIN S/D RATIO: 1.48
PV PEAK D VEL: 0.5 M/S
PV PEAK S VEL: 0.74 M/S
RA MAJOR: 5.8 CM
RA PRESSURE: 3 MMHG
RA WIDTH: 3.66 CM
RIGHT VENTRICULAR END-DIASTOLIC DIMENSION: 3.62 CM
SINUS: 4.2 CM
STJ: 3.54 CM
TDI LATERAL: 0.15 M/S
TDI SEPTAL: 0.07 M/S
TDI: 0.11 M/S
TR MAX PG: 27 MMHG
TRICUSPID ANNULAR PLANE SYSTOLIC EXCURSION: 2.14 CM
TV REST PULMONARY ARTERY PRESSURE: 30 MMHG

## 2021-02-04 PROCEDURE — 93306 TTE W/DOPPLER COMPLETE: CPT

## 2021-02-04 PROCEDURE — 93306 TTE W/DOPPLER COMPLETE: CPT | Mod: 26,,, | Performed by: INTERNAL MEDICINE

## 2021-02-04 PROCEDURE — 93306 ECHO (CUPID ONLY): ICD-10-PCS | Mod: 26,,, | Performed by: INTERNAL MEDICINE

## 2021-02-10 ENCOUNTER — TELEPHONE (OUTPATIENT)
Dept: INTERNAL MEDICINE | Facility: CLINIC | Age: 51
End: 2021-02-10

## 2021-02-10 DIAGNOSIS — I71.22 ANEURYSM OF AORTIC ARCH: Primary | ICD-10-CM

## 2021-02-11 ENCOUNTER — PATIENT MESSAGE (OUTPATIENT)
Dept: INTERNAL MEDICINE | Facility: CLINIC | Age: 51
End: 2021-02-11

## 2021-02-11 ENCOUNTER — HOSPITAL ENCOUNTER (OUTPATIENT)
Dept: RADIOLOGY | Facility: HOSPITAL | Age: 51
Discharge: HOME | End: 2021-02-11
Attending: INTERNAL MEDICINE
Payer: COMMERCIAL

## 2021-02-11 DIAGNOSIS — I71.22 ANEURYSM OF AORTIC ARCH: ICD-10-CM

## 2021-02-11 PROCEDURE — 71250 CT THORAX DX C-: CPT | Mod: TC

## 2021-02-11 PROCEDURE — 71250 CT THORAX DX C-: CPT | Mod: 26,,, | Performed by: RADIOLOGY

## 2021-02-11 PROCEDURE — 71250 CT CHEST WITHOUT CONTRAST: ICD-10-PCS | Mod: 26,,, | Performed by: RADIOLOGY

## 2021-02-19 ENCOUNTER — TELEPHONE (OUTPATIENT)
Dept: INTERNAL MEDICINE | Facility: CLINIC | Age: 51
End: 2021-02-19

## 2021-02-26 ENCOUNTER — TELEPHONE (OUTPATIENT)
Dept: INTERNAL MEDICINE | Facility: CLINIC | Age: 51
End: 2021-02-26

## 2021-03-03 ENCOUNTER — TELEPHONE (OUTPATIENT)
Dept: RESEARCH | Facility: HOSPITAL | Age: 51
End: 2021-03-03

## 2021-03-15 ENCOUNTER — RESEARCH ENCOUNTER (OUTPATIENT)
Dept: RESEARCH | Facility: HOSPITAL | Age: 51
End: 2021-03-15

## 2021-04-16 ENCOUNTER — PATIENT MESSAGE (OUTPATIENT)
Dept: RESEARCH | Facility: HOSPITAL | Age: 51
End: 2021-04-16

## 2021-10-13 ENCOUNTER — TELEPHONE (OUTPATIENT)
Dept: INTERNAL MEDICINE | Facility: CLINIC | Age: 51
End: 2021-10-13

## 2021-10-13 DIAGNOSIS — Z00.00 WELLNESS EXAMINATION: Primary | ICD-10-CM

## 2021-10-13 DIAGNOSIS — Z12.11 COLON CANCER SCREENING: Primary | ICD-10-CM

## 2021-10-21 DIAGNOSIS — Z12.11 COLON CANCER SCREENING: Primary | ICD-10-CM

## 2021-10-21 DIAGNOSIS — Z01.818 PRE-OP TESTING: ICD-10-CM

## 2021-10-21 RX ORDER — SODIUM, POTASSIUM,MAG SULFATES 17.5-3.13G
1 SOLUTION, RECONSTITUTED, ORAL ORAL DAILY
Qty: 1 KIT | Refills: 0 | Status: SHIPPED | OUTPATIENT
Start: 2021-10-21 | End: 2021-10-23

## 2021-10-29 ENCOUNTER — LAB VISIT (OUTPATIENT)
Dept: LAB | Facility: HOSPITAL | Age: 51
End: 2021-10-29
Attending: INTERNAL MEDICINE
Payer: COMMERCIAL

## 2021-10-29 DIAGNOSIS — Z00.00 WELLNESS EXAMINATION: ICD-10-CM

## 2021-10-29 LAB
ALBUMIN SERPL BCP-MCNC: 4.1 G/DL (ref 3.5–5.2)
ALP SERPL-CCNC: 35 U/L (ref 55–135)
ALT SERPL W/O P-5'-P-CCNC: 21 U/L (ref 10–44)
ANION GAP SERPL CALC-SCNC: 5 MMOL/L (ref 8–16)
AST SERPL-CCNC: 20 U/L (ref 10–40)
BASOPHILS # BLD AUTO: 0.06 K/UL (ref 0–0.2)
BASOPHILS NFR BLD: 0.8 % (ref 0–1.9)
BILIRUB SERPL-MCNC: 0.8 MG/DL (ref 0.1–1)
BUN SERPL-MCNC: 17 MG/DL (ref 6–20)
CALCIUM SERPL-MCNC: 9.9 MG/DL (ref 8.7–10.5)
CHLORIDE SERPL-SCNC: 103 MMOL/L (ref 95–110)
CHOLEST SERPL-MCNC: 220 MG/DL (ref 120–199)
CHOLEST/HDLC SERPL: 3.7 {RATIO} (ref 2–5)
CO2 SERPL-SCNC: 30 MMOL/L (ref 23–29)
COMPLEXED PSA SERPL-MCNC: 2.5 NG/ML (ref 0–4)
CREAT SERPL-MCNC: 1 MG/DL (ref 0.5–1.4)
DIFFERENTIAL METHOD: ABNORMAL
EOSINOPHIL # BLD AUTO: 0.4 K/UL (ref 0–0.5)
EOSINOPHIL NFR BLD: 5.5 % (ref 0–8)
ERYTHROCYTE [DISTWIDTH] IN BLOOD BY AUTOMATED COUNT: 11.8 % (ref 11.5–14.5)
EST. GFR  (AFRICAN AMERICAN): >60 ML/MIN/1.73 M^2
EST. GFR  (NON AFRICAN AMERICAN): >60 ML/MIN/1.73 M^2
GLUCOSE SERPL-MCNC: 107 MG/DL (ref 70–110)
HCT VFR BLD AUTO: 44.4 % (ref 40–54)
HDLC SERPL-MCNC: 59 MG/DL (ref 40–75)
HDLC SERPL: 26.8 % (ref 20–50)
HGB BLD-MCNC: 14.4 G/DL (ref 14–18)
IMM GRANULOCYTES # BLD AUTO: 0.04 K/UL (ref 0–0.04)
IMM GRANULOCYTES NFR BLD AUTO: 0.6 % (ref 0–0.5)
LDLC SERPL CALC-MCNC: 129.8 MG/DL (ref 63–159)
LYMPHOCYTES # BLD AUTO: 1.8 K/UL (ref 1–4.8)
LYMPHOCYTES NFR BLD: 26.1 % (ref 18–48)
MCH RBC QN AUTO: 31.3 PG (ref 27–31)
MCHC RBC AUTO-ENTMCNC: 32.4 G/DL (ref 32–36)
MCV RBC AUTO: 97 FL (ref 82–98)
MONOCYTES # BLD AUTO: 0.6 K/UL (ref 0.3–1)
MONOCYTES NFR BLD: 7.9 % (ref 4–15)
NEUTROPHILS # BLD AUTO: 4.2 K/UL (ref 1.8–7.7)
NEUTROPHILS NFR BLD: 59.1 % (ref 38–73)
NONHDLC SERPL-MCNC: 161 MG/DL
NRBC BLD-RTO: 0 /100 WBC
PLATELET # BLD AUTO: 148 K/UL (ref 150–450)
PMV BLD AUTO: 10.9 FL (ref 9.2–12.9)
POTASSIUM SERPL-SCNC: 4.6 MMOL/L (ref 3.5–5.1)
PROT SERPL-MCNC: 7.4 G/DL (ref 6–8.4)
RBC # BLD AUTO: 4.6 M/UL (ref 4.6–6.2)
SODIUM SERPL-SCNC: 138 MMOL/L (ref 136–145)
TRIGL SERPL-MCNC: 156 MG/DL (ref 30–150)
WBC # BLD AUTO: 7.06 K/UL (ref 3.9–12.7)

## 2021-10-29 PROCEDURE — 80053 COMPREHEN METABOLIC PANEL: CPT | Performed by: INTERNAL MEDICINE

## 2021-10-29 PROCEDURE — 85025 COMPLETE CBC W/AUTO DIFF WBC: CPT | Performed by: INTERNAL MEDICINE

## 2021-10-29 PROCEDURE — 36415 COLL VENOUS BLD VENIPUNCTURE: CPT | Performed by: INTERNAL MEDICINE

## 2021-10-29 PROCEDURE — 84153 ASSAY OF PSA TOTAL: CPT | Performed by: INTERNAL MEDICINE

## 2021-10-29 PROCEDURE — 80061 LIPID PANEL: CPT | Performed by: INTERNAL MEDICINE

## 2021-11-02 DIAGNOSIS — Z12.11 COLON CANCER SCREENING: Primary | ICD-10-CM

## 2021-11-02 RX ORDER — POLYETHYLENE GLYCOL 3350, SODIUM SULFATE ANHYDROUS, SODIUM BICARBONATE, SODIUM CHLORIDE, POTASSIUM CHLORIDE 236; 22.74; 6.74; 5.86; 2.97 G/4L; G/4L; G/4L; G/4L; G/4L
4 POWDER, FOR SOLUTION ORAL ONCE
Qty: 4000 ML | Refills: 0 | Status: SHIPPED | OUTPATIENT
Start: 2021-11-02 | End: 2021-11-02

## 2021-11-04 ENCOUNTER — OFFICE VISIT (OUTPATIENT)
Dept: INTERNAL MEDICINE | Facility: CLINIC | Age: 51
End: 2021-11-04
Payer: COMMERCIAL

## 2021-11-04 ENCOUNTER — LAB VISIT (OUTPATIENT)
Dept: LAB | Facility: HOSPITAL | Age: 51
End: 2021-11-04
Attending: INTERNAL MEDICINE
Payer: COMMERCIAL

## 2021-11-04 VITALS
OXYGEN SATURATION: 99 % | DIASTOLIC BLOOD PRESSURE: 80 MMHG | HEART RATE: 77 BPM | SYSTOLIC BLOOD PRESSURE: 120 MMHG | WEIGHT: 269.81 LBS | BODY MASS INDEX: 34.63 KG/M2 | HEIGHT: 74 IN

## 2021-11-04 DIAGNOSIS — Z00.00 WELLNESS EXAMINATION: Primary | ICD-10-CM

## 2021-11-04 DIAGNOSIS — Z00.00 WELLNESS EXAMINATION: ICD-10-CM

## 2021-11-04 DIAGNOSIS — R39.11 URINARY HESITANCY: ICD-10-CM

## 2021-11-04 DIAGNOSIS — R39.14 FEELING OF INCOMPLETE BLADDER EMPTYING: ICD-10-CM

## 2021-11-04 DIAGNOSIS — R35.0 FREQUENCY OF URINATION: ICD-10-CM

## 2021-11-04 LAB
ABO + RH BLD: NORMAL
SARS-COV-2 IGG SERPL IA-ACNC: 733.2 AU/ML
SARS-COV-2 IGG SERPL QL IA: POSITIVE

## 2021-11-04 PROCEDURE — 3074F SYST BP LT 130 MM HG: CPT | Mod: CPTII,S$GLB,, | Performed by: INTERNAL MEDICINE

## 2021-11-04 PROCEDURE — 3079F PR MOST RECENT DIASTOLIC BLOOD PRESSURE 80-89 MM HG: ICD-10-PCS | Mod: CPTII,S$GLB,, | Performed by: INTERNAL MEDICINE

## 2021-11-04 PROCEDURE — 3008F BODY MASS INDEX DOCD: CPT | Mod: CPTII,S$GLB,, | Performed by: INTERNAL MEDICINE

## 2021-11-04 PROCEDURE — 99396 PR PREVENTIVE VISIT,EST,40-64: ICD-10-PCS | Mod: S$GLB,,, | Performed by: INTERNAL MEDICINE

## 2021-11-04 PROCEDURE — 99999 PR PBB SHADOW E&M-EST. PATIENT-LVL IV: CPT | Mod: PBBFAC,,, | Performed by: INTERNAL MEDICINE

## 2021-11-04 PROCEDURE — 3008F PR BODY MASS INDEX (BMI) DOCUMENTED: ICD-10-PCS | Mod: CPTII,S$GLB,, | Performed by: INTERNAL MEDICINE

## 2021-11-04 PROCEDURE — 4010F PR ACE/ARB THEARPY RXD/TAKEN: ICD-10-PCS | Mod: CPTII,S$GLB,, | Performed by: INTERNAL MEDICINE

## 2021-11-04 PROCEDURE — 1159F PR MEDICATION LIST DOCUMENTED IN MEDICAL RECORD: ICD-10-PCS | Mod: CPTII,S$GLB,, | Performed by: INTERNAL MEDICINE

## 2021-11-04 PROCEDURE — 3074F PR MOST RECENT SYSTOLIC BLOOD PRESSURE < 130 MM HG: ICD-10-PCS | Mod: CPTII,S$GLB,, | Performed by: INTERNAL MEDICINE

## 2021-11-04 PROCEDURE — 86900 BLOOD TYPING SEROLOGIC ABO: CPT | Performed by: INTERNAL MEDICINE

## 2021-11-04 PROCEDURE — 99999 PR PBB SHADOW E&M-EST. PATIENT-LVL IV: ICD-10-PCS | Mod: PBBFAC,,, | Performed by: INTERNAL MEDICINE

## 2021-11-04 PROCEDURE — 36415 COLL VENOUS BLD VENIPUNCTURE: CPT | Performed by: INTERNAL MEDICINE

## 2021-11-04 PROCEDURE — 3079F DIAST BP 80-89 MM HG: CPT | Mod: CPTII,S$GLB,, | Performed by: INTERNAL MEDICINE

## 2021-11-04 PROCEDURE — 4010F ACE/ARB THERAPY RXD/TAKEN: CPT | Mod: CPTII,S$GLB,, | Performed by: INTERNAL MEDICINE

## 2021-11-04 PROCEDURE — 1159F MED LIST DOCD IN RCRD: CPT | Mod: CPTII,S$GLB,, | Performed by: INTERNAL MEDICINE

## 2021-11-04 PROCEDURE — 86769 SARS-COV-2 COVID-19 ANTIBODY: CPT | Performed by: INTERNAL MEDICINE

## 2021-11-04 PROCEDURE — 99396 PREV VISIT EST AGE 40-64: CPT | Mod: S$GLB,,, | Performed by: INTERNAL MEDICINE

## 2021-11-17 ENCOUNTER — OFFICE VISIT (OUTPATIENT)
Dept: UROLOGY | Facility: CLINIC | Age: 51
End: 2021-11-17
Payer: COMMERCIAL

## 2021-11-17 VITALS
HEART RATE: 84 BPM | HEIGHT: 74 IN | SYSTOLIC BLOOD PRESSURE: 134 MMHG | WEIGHT: 271.63 LBS | BODY MASS INDEX: 34.86 KG/M2 | DIASTOLIC BLOOD PRESSURE: 89 MMHG

## 2021-11-17 DIAGNOSIS — R39.11 URINARY HESITANCY: ICD-10-CM

## 2021-11-17 DIAGNOSIS — R35.0 FREQUENCY OF URINATION: ICD-10-CM

## 2021-11-17 DIAGNOSIS — N13.8 BPH WITH URINARY OBSTRUCTION: Primary | ICD-10-CM

## 2021-11-17 DIAGNOSIS — N40.1 BPH WITH URINARY OBSTRUCTION: Primary | ICD-10-CM

## 2021-11-17 DIAGNOSIS — N52.9 ERECTILE DYSFUNCTION, UNSPECIFIED ERECTILE DYSFUNCTION TYPE: ICD-10-CM

## 2021-11-17 DIAGNOSIS — R39.14 FEELING OF INCOMPLETE BLADDER EMPTYING: ICD-10-CM

## 2021-11-17 PROCEDURE — 99999 PR PBB SHADOW E&M-EST. PATIENT-LVL IV: ICD-10-PCS | Mod: PBBFAC,,, | Performed by: UROLOGY

## 2021-11-17 PROCEDURE — 3075F PR MOST RECENT SYSTOLIC BLOOD PRESS GE 130-139MM HG: ICD-10-PCS | Mod: CPTII,S$GLB,, | Performed by: UROLOGY

## 2021-11-17 PROCEDURE — 3008F BODY MASS INDEX DOCD: CPT | Mod: CPTII,S$GLB,, | Performed by: UROLOGY

## 2021-11-17 PROCEDURE — 1159F PR MEDICATION LIST DOCUMENTED IN MEDICAL RECORD: ICD-10-PCS | Mod: CPTII,S$GLB,, | Performed by: UROLOGY

## 2021-11-17 PROCEDURE — 3079F DIAST BP 80-89 MM HG: CPT | Mod: CPTII,S$GLB,, | Performed by: UROLOGY

## 2021-11-17 PROCEDURE — 1160F PR REVIEW ALL MEDS BY PRESCRIBER/CLIN PHARMACIST DOCUMENTED: ICD-10-PCS | Mod: CPTII,S$GLB,, | Performed by: UROLOGY

## 2021-11-17 PROCEDURE — 4010F ACE/ARB THERAPY RXD/TAKEN: CPT | Mod: CPTII,S$GLB,, | Performed by: UROLOGY

## 2021-11-17 PROCEDURE — 3075F SYST BP GE 130 - 139MM HG: CPT | Mod: CPTII,S$GLB,, | Performed by: UROLOGY

## 2021-11-17 PROCEDURE — 3079F PR MOST RECENT DIASTOLIC BLOOD PRESSURE 80-89 MM HG: ICD-10-PCS | Mod: CPTII,S$GLB,, | Performed by: UROLOGY

## 2021-11-17 PROCEDURE — 1159F MED LIST DOCD IN RCRD: CPT | Mod: CPTII,S$GLB,, | Performed by: UROLOGY

## 2021-11-17 PROCEDURE — 4010F PR ACE/ARB THEARPY RXD/TAKEN: ICD-10-PCS | Mod: CPTII,S$GLB,, | Performed by: UROLOGY

## 2021-11-17 PROCEDURE — 99999 PR PBB SHADOW E&M-EST. PATIENT-LVL IV: CPT | Mod: PBBFAC,,, | Performed by: UROLOGY

## 2021-11-17 PROCEDURE — 99203 PR OFFICE/OUTPT VISIT, NEW, LEVL III, 30-44 MIN: ICD-10-PCS | Mod: S$GLB,,, | Performed by: UROLOGY

## 2021-11-17 PROCEDURE — 99203 OFFICE O/P NEW LOW 30 MIN: CPT | Mod: S$GLB,,, | Performed by: UROLOGY

## 2021-11-17 PROCEDURE — 3008F PR BODY MASS INDEX (BMI) DOCUMENTED: ICD-10-PCS | Mod: CPTII,S$GLB,, | Performed by: UROLOGY

## 2021-11-17 PROCEDURE — 1160F RVW MEDS BY RX/DR IN RCRD: CPT | Mod: CPTII,S$GLB,, | Performed by: UROLOGY

## 2021-11-17 RX ORDER — SILDENAFIL 100 MG/1
100 TABLET, FILM COATED ORAL DAILY PRN
Qty: 15 TABLET | Refills: 11 | Status: SHIPPED | OUTPATIENT
Start: 2021-11-17 | End: 2022-11-17

## 2021-11-17 RX ORDER — TAMSULOSIN HYDROCHLORIDE 0.4 MG/1
0.4 CAPSULE ORAL DAILY
Qty: 30 CAPSULE | Refills: 12 | Status: SHIPPED | OUTPATIENT
Start: 2021-11-17 | End: 2022-11-28

## 2021-11-20 ENCOUNTER — LAB VISIT (OUTPATIENT)
Dept: SPORTS MEDICINE | Facility: CLINIC | Age: 51
End: 2021-11-20
Payer: COMMERCIAL

## 2021-11-20 DIAGNOSIS — Z01.818 PRE-OP TESTING: ICD-10-CM

## 2021-11-20 PROCEDURE — U0003 INFECTIOUS AGENT DETECTION BY NUCLEIC ACID (DNA OR RNA); SEVERE ACUTE RESPIRATORY SYNDROME CORONAVIRUS 2 (SARS-COV-2) (CORONAVIRUS DISEASE [COVID-19]), AMPLIFIED PROBE TECHNIQUE, MAKING USE OF HIGH THROUGHPUT TECHNOLOGIES AS DESCRIBED BY CMS-2020-01-R: HCPCS | Performed by: CLINICAL NURSE SPECIALIST

## 2021-11-20 PROCEDURE — U0005 INFEC AGEN DETEC AMPLI PROBE: HCPCS | Performed by: CLINICAL NURSE SPECIALIST

## 2021-11-21 LAB
SARS-COV-2 RNA RESP QL NAA+PROBE: NOT DETECTED
SARS-COV-2- CYCLE NUMBER: NORMAL

## 2021-11-23 ENCOUNTER — ANESTHESIA EVENT (OUTPATIENT)
Dept: ENDOSCOPY | Facility: HOSPITAL | Age: 51
End: 2021-11-23
Payer: COMMERCIAL

## 2021-11-23 ENCOUNTER — ANESTHESIA (OUTPATIENT)
Dept: ENDOSCOPY | Facility: HOSPITAL | Age: 51
End: 2021-11-23
Payer: COMMERCIAL

## 2021-11-23 ENCOUNTER — HOSPITAL ENCOUNTER (OUTPATIENT)
Facility: HOSPITAL | Age: 51
Discharge: HOME | End: 2021-11-23
Attending: COLON & RECTAL SURGERY | Admitting: COLON & RECTAL SURGERY
Payer: COMMERCIAL

## 2021-11-23 VITALS
SYSTOLIC BLOOD PRESSURE: 130 MMHG | HEART RATE: 64 BPM | WEIGHT: 263 LBS | BODY MASS INDEX: 33.75 KG/M2 | TEMPERATURE: 98 F | DIASTOLIC BLOOD PRESSURE: 82 MMHG | OXYGEN SATURATION: 100 % | HEIGHT: 74 IN | RESPIRATION RATE: 18 BRPM

## 2021-11-23 DIAGNOSIS — Z12.11 SCREENING FOR COLON CANCER: ICD-10-CM

## 2021-11-23 PROCEDURE — 25000003 PHARM REV CODE 250: Performed by: COLON & RECTAL SURGERY

## 2021-11-23 PROCEDURE — 37000009 HC ANESTHESIA EA ADD 15 MINS: Performed by: COLON & RECTAL SURGERY

## 2021-11-23 PROCEDURE — 45385 COLONOSCOPY W/LESION REMOVAL: CPT | Performed by: COLON & RECTAL SURGERY

## 2021-11-23 PROCEDURE — 37000008 HC ANESTHESIA 1ST 15 MINUTES: Performed by: COLON & RECTAL SURGERY

## 2021-11-23 PROCEDURE — E9220 PRA ENDO ANESTHESIA: ICD-10-PCS | Mod: 33,,, | Performed by: STUDENT IN AN ORGANIZED HEALTH CARE EDUCATION/TRAINING PROGRAM

## 2021-11-23 PROCEDURE — 88305 TISSUE EXAM BY PATHOLOGIST: CPT | Performed by: PATHOLOGY

## 2021-11-23 PROCEDURE — 27201028 HC NEEDLE, SCLERO: Performed by: COLON & RECTAL SURGERY

## 2021-11-23 PROCEDURE — 27201089 HC SNARE, DISP (ANY): Performed by: COLON & RECTAL SURGERY

## 2021-11-23 PROCEDURE — 45381 COLONOSCOPY SUBMUCOUS NJX: CPT | Mod: 51,,, | Performed by: COLON & RECTAL SURGERY

## 2021-11-23 PROCEDURE — 45385 PR COLONOSCOPY,REMV LESN,SNARE: ICD-10-PCS | Mod: 33,,, | Performed by: COLON & RECTAL SURGERY

## 2021-11-23 PROCEDURE — 45381 PR COLONOSCPY,FLEX,W/DIR SUBMUC INJECT: ICD-10-PCS | Mod: 51,,, | Performed by: COLON & RECTAL SURGERY

## 2021-11-23 PROCEDURE — 27201042 HC RETRIEVAL NET: Performed by: COLON & RECTAL SURGERY

## 2021-11-23 PROCEDURE — 45385 COLONOSCOPY W/LESION REMOVAL: CPT | Mod: 33,,, | Performed by: COLON & RECTAL SURGERY

## 2021-11-23 PROCEDURE — 88305 TISSUE EXAM BY PATHOLOGIST: ICD-10-PCS | Mod: 26,,, | Performed by: PATHOLOGY

## 2021-11-23 PROCEDURE — 45381 COLONOSCOPY SUBMUCOUS NJX: CPT | Performed by: COLON & RECTAL SURGERY

## 2021-11-23 PROCEDURE — 63600175 PHARM REV CODE 636 W HCPCS: Performed by: STUDENT IN AN ORGANIZED HEALTH CARE EDUCATION/TRAINING PROGRAM

## 2021-11-23 PROCEDURE — 88305 TISSUE EXAM BY PATHOLOGIST: CPT | Mod: 26,,, | Performed by: PATHOLOGY

## 2021-11-23 PROCEDURE — E9220 PRA ENDO ANESTHESIA: HCPCS | Mod: 33,,, | Performed by: STUDENT IN AN ORGANIZED HEALTH CARE EDUCATION/TRAINING PROGRAM

## 2021-11-23 RX ORDER — LIDOCAINE HCL/PF 100 MG/5ML
SYRINGE (ML) INTRAVENOUS
Status: DISCONTINUED | OUTPATIENT
Start: 2021-11-23 | End: 2021-11-23

## 2021-11-23 RX ORDER — PROPOFOL 10 MG/ML
VIAL (ML) INTRAVENOUS CONTINUOUS PRN
Status: DISCONTINUED | OUTPATIENT
Start: 2021-11-23 | End: 2021-11-23

## 2021-11-23 RX ORDER — SODIUM CHLORIDE 9 MG/ML
INJECTION, SOLUTION INTRAVENOUS CONTINUOUS
Status: DISCONTINUED | OUTPATIENT
Start: 2021-11-23 | End: 2021-11-23 | Stop reason: HOSPADM

## 2021-11-23 RX ORDER — PROPOFOL 10 MG/ML
VIAL (ML) INTRAVENOUS
Status: DISCONTINUED | OUTPATIENT
Start: 2021-11-23 | End: 2021-11-23

## 2021-11-23 RX ADMIN — PROPOFOL 200 MCG/KG/MIN: 10 INJECTION, EMULSION INTRAVENOUS at 11:11

## 2021-11-23 RX ADMIN — PROPOFOL 30 MG: 10 INJECTION, EMULSION INTRAVENOUS at 11:11

## 2021-11-23 RX ADMIN — Medication 50 MG: at 11:11

## 2021-11-23 RX ADMIN — PROPOFOL 100 MG: 10 INJECTION, EMULSION INTRAVENOUS at 11:11

## 2021-11-23 RX ADMIN — SODIUM CHLORIDE: 0.9 INJECTION, SOLUTION INTRAVENOUS at 11:11

## 2021-12-07 ENCOUNTER — PATIENT MESSAGE (OUTPATIENT)
Dept: INTERNAL MEDICINE | Facility: CLINIC | Age: 51
End: 2021-12-07
Payer: COMMERCIAL

## 2021-12-08 ENCOUNTER — PATIENT MESSAGE (OUTPATIENT)
Dept: SURGERY | Facility: CLINIC | Age: 51
End: 2021-12-08
Payer: COMMERCIAL

## 2021-12-08 ENCOUNTER — TELEPHONE (OUTPATIENT)
Dept: SURGERY | Facility: CLINIC | Age: 51
End: 2021-12-08
Payer: COMMERCIAL

## 2021-12-08 DIAGNOSIS — C18.7 CANCER OF SIGMOID COLON: Primary | ICD-10-CM

## 2021-12-08 LAB
FINAL PATHOLOGIC DIAGNOSIS: ABNORMAL
Lab: ABNORMAL

## 2021-12-08 RX ORDER — ALPRAZOLAM 0.25 MG/1
0.25 TABLET ORAL 3 TIMES DAILY PRN
Qty: 60 TABLET | Refills: 0 | Status: SHIPPED | OUTPATIENT
Start: 2021-12-08 | End: 2021-12-30 | Stop reason: SDUPTHER

## 2021-12-09 ENCOUNTER — TELEPHONE (OUTPATIENT)
Dept: SURGERY | Facility: CLINIC | Age: 51
End: 2021-12-09
Payer: COMMERCIAL

## 2021-12-10 ENCOUNTER — HOSPITAL ENCOUNTER (OUTPATIENT)
Dept: RADIOLOGY | Facility: HOSPITAL | Age: 51
Discharge: HOME | End: 2021-12-10
Attending: COLON & RECTAL SURGERY
Payer: COMMERCIAL

## 2021-12-10 DIAGNOSIS — C18.7 CANCER OF SIGMOID COLON: ICD-10-CM

## 2021-12-10 LAB
CREAT SERPL-MCNC: 1.2 MG/DL (ref 0.5–1.4)
SAMPLE: NORMAL

## 2021-12-10 PROCEDURE — 74177 CT CHEST ABDOMEN PELVIS WITH CONTRAST (XPD): ICD-10-PCS | Mod: 26,,, | Performed by: RADIOLOGY

## 2021-12-10 PROCEDURE — A9698 NON-RAD CONTRAST MATERIALNOC: HCPCS | Performed by: COLON & RECTAL SURGERY

## 2021-12-10 PROCEDURE — 71260 CT THORAX DX C+: CPT | Mod: 26,,, | Performed by: RADIOLOGY

## 2021-12-10 PROCEDURE — 74177 CT ABD & PELVIS W/CONTRAST: CPT | Mod: TC

## 2021-12-10 PROCEDURE — 71260 CT THORAX DX C+: CPT | Mod: TC

## 2021-12-10 PROCEDURE — 71260 CT CHEST ABDOMEN PELVIS WITH CONTRAST (XPD): ICD-10-PCS | Mod: 26,,, | Performed by: RADIOLOGY

## 2021-12-10 PROCEDURE — 74177 CT ABD & PELVIS W/CONTRAST: CPT | Mod: 26,,, | Performed by: RADIOLOGY

## 2021-12-10 PROCEDURE — 25500020 PHARM REV CODE 255: Performed by: COLON & RECTAL SURGERY

## 2021-12-10 RX ADMIN — IOHEXOL 1000 ML: 12 SOLUTION ORAL at 02:12

## 2021-12-10 RX ADMIN — IOHEXOL 100 ML: 350 INJECTION, SOLUTION INTRAVENOUS at 04:12

## 2021-12-15 ENCOUNTER — LAB VISIT (OUTPATIENT)
Dept: LAB | Facility: HOSPITAL | Age: 51
End: 2021-12-15
Attending: COLON & RECTAL SURGERY
Payer: COMMERCIAL

## 2021-12-15 ENCOUNTER — DOCUMENTATION ONLY (OUTPATIENT)
Dept: SURGERY | Facility: CLINIC | Age: 51
End: 2021-12-15
Payer: COMMERCIAL

## 2021-12-15 ENCOUNTER — OFFICE VISIT (OUTPATIENT)
Dept: SURGERY | Facility: CLINIC | Age: 51
End: 2021-12-15
Payer: COMMERCIAL

## 2021-12-15 VITALS
DIASTOLIC BLOOD PRESSURE: 89 MMHG | HEART RATE: 96 BPM | SYSTOLIC BLOOD PRESSURE: 167 MMHG | HEIGHT: 74 IN | BODY MASS INDEX: 34.87 KG/M2 | WEIGHT: 271.69 LBS

## 2021-12-15 DIAGNOSIS — C18.7 MALIGNANT NEOPLASM OF SIGMOID COLON: ICD-10-CM

## 2021-12-15 LAB — CEA SERPL-MCNC: <1.7 NG/ML (ref 0–5)

## 2021-12-15 PROCEDURE — 99999 PR PBB SHADOW E&M-EST. PATIENT-LVL III: CPT | Mod: PBBFAC,,, | Performed by: COLON & RECTAL SURGERY

## 2021-12-15 PROCEDURE — 99215 OFFICE O/P EST HI 40 MIN: CPT | Mod: S$GLB,,, | Performed by: COLON & RECTAL SURGERY

## 2021-12-15 PROCEDURE — 4010F ACE/ARB THERAPY RXD/TAKEN: CPT | Mod: CPTII,S$GLB,, | Performed by: COLON & RECTAL SURGERY

## 2021-12-15 PROCEDURE — 99215 PR OFFICE/OUTPT VISIT, EST, LEVL V, 40-54 MIN: ICD-10-PCS | Mod: S$GLB,,, | Performed by: COLON & RECTAL SURGERY

## 2021-12-15 PROCEDURE — 82378 CARCINOEMBRYONIC ANTIGEN: CPT | Performed by: COLON & RECTAL SURGERY

## 2021-12-15 PROCEDURE — 4010F PR ACE/ARB THEARPY RXD/TAKEN: ICD-10-PCS | Mod: CPTII,S$GLB,, | Performed by: COLON & RECTAL SURGERY

## 2021-12-15 PROCEDURE — 99999 PR PBB SHADOW E&M-EST. PATIENT-LVL III: ICD-10-PCS | Mod: PBBFAC,,, | Performed by: COLON & RECTAL SURGERY

## 2021-12-15 PROCEDURE — 36415 COLL VENOUS BLD VENIPUNCTURE: CPT | Performed by: COLON & RECTAL SURGERY

## 2021-12-15 RX ORDER — NEOMYCIN SULFATE 500 MG/1
TABLET ORAL
Qty: 6 TABLET | Refills: 0 | Status: ON HOLD | OUTPATIENT
Start: 2021-12-15 | End: 2022-01-18 | Stop reason: HOSPADM

## 2021-12-15 RX ORDER — METRONIDAZOLE 500 MG/1
TABLET ORAL
Qty: 3 TABLET | Refills: 0 | Status: ON HOLD | OUTPATIENT
Start: 2021-12-15 | End: 2022-01-18 | Stop reason: HOSPADM

## 2021-12-15 RX ORDER — POLYETHYLENE GLYCOL 3350 17 G/17G
POWDER, FOR SOLUTION ORAL
Qty: 238 G | Refills: 0 | Status: ON HOLD | OUTPATIENT
Start: 2021-12-15 | End: 2022-01-18 | Stop reason: HOSPADM

## 2021-12-15 NOTE — H&P (VIEW-ONLY)
CRS Office Visit History and Physical    Referring Md:   GM Webber Md  [address] [place] [address]    SUBJECTIVE:     Chief Complaint:  Colon cancer    History of Present Illness:  Patient is a 51 y.o. male presents with history of large polyp of the sigmoid colon which was removed piecemeal.  This had a wide base and measured at least 50 mm.  Since the polypectomy was performed, the patient denies any rectal bleeding or abdominal pain he denies any problems moving his bowels..     11- [facility]:   RECTUM, BIOPSY:   -Moderately differentiated adenocarcinoma arising within  a tubulovillous   adenoma with high-grade dysplasia, see microscopic and comment.   COMMENT:   This case was also reviewed by Dr. Randi Allen, GI Path in our [facility], who concurs with the above diagnosis. A complete copy of her report   is attached.   Tatyana Gardner M.D.   Report Attached   Performing site:   [address] [address]     EXAMINATION:  CT CHEST ABDOMEN PELVIS WITH CONTRAST (XPD)     CLINICAL HISTORY:  Colon cancer, metastatic, staging; Malignant neoplasm of sigmoid colon     TECHNIQUE:  Low dose axial images, sagittal and coronal reformations were obtained from the thoracic inlet to the pubic symphysis following the IV administration of 100 mL of Omnipaque 350 and the oral administration of 1000 mL of Omnipaque 12.     COMPARISON:  02/11/2021, 08/14/2018     FINDINGS:  CHEST     Support tubes and lines: None.     Aorta: Normal caliber.     Heart: Normal size..     Coronary arteries: No calcifications.     Pericardium: Normal. No effusion, thickening, or calcification.     Central pulmonary arteries: Normal caliber.     Base of neck/thyroid: Normal.     Lymph nodes: No supraclavicular, axillary, internal mammary, mediastinal or hilar lymphadenopathy.     Esophagus: Normal.     Pleura: No effusion, thickening or calcification.      Body wall: Unremarkable.     Airways: Normal.     Lungs: A 5 mm nodule is present in the right middle lobe (series 6, image 249).  This is unchanged from 08/14/2018.  A 4 mm nodule in the right middle lobe (series 2, image 209) is also unchanged.  A 4 mm nodule in the right upper lobe (series 6, image 152) was not included in the field of view on 08/14/2018 but is unchanged when compared to 02/11/2021.     Bones: Unremarkable.     ABDOMEN/PELVIS     Liver: A 15 mm low-attenuation lesion in the left hepatic lobe (series 4, image 19) is consistent with a cyst.  An additional 15 mm cyst is seen in the left hepatic lobe (series 4, image 23).     Gallbladder/bile ducts: Unremarkable. No intra or extrahepatic biliary ductal dilatation     Pancreas: Unremarkable.     Spleen: Unremarkable.     Adrenals: Unremarkable.     Kidneys: A 9 mm low-attenuation lesion in the inferior left kidney is too small to definitively characterize but likely represents a simple cyst.     Lymph nodes: No abdominal or pelvic lymphadenopathy.     Bowel and mesentery: Unremarkable.     Abdominal aorta: Unremarkable.     Inferior vena cava: Unremarkable.     Free fluid or free air: None.     Pelvis: Unremarkable.     Urinary bladder: Unremarkable.     Body wall: Unremarkable.     Bones: Unremarkable.     Impression:     1. No evidence of metastatic disease within the chest/abdomen/pelvis.  2. Multiple small bilateral pulmonary nodules, most of which are unchanged when compared to 08/14/2018.  One small nodule in the right upper lobe was not included on the field of view for that exam but is unchanged when compared to 02/11/2021.  Clinical considerations will determine follow-up.        Electronically signed by: Radha Greer  Date:                                            12/10/2021  Time:                                           16:25  No past medical history on file.    Past Surgical History:   Procedure Laterality Date    COLONOSCOPY   8/10/15    COLONOSCOPY N/A 11/23/2021    Procedure: COLONOSCOPY;  Surgeon: GM Webber MD;  Location: [facility] ([address] [place] [address]);  Service: Endoscopy;  Laterality: N/A;  Covid test 11/23 Casey, yulisa emailed to [email or web address]    TONSILLECTOMY         Review of patient's allergies indicates:   Allergen Reactions    Lisinopril Other (See Comments)       Current Outpatient Medications on File Prior to Visit   Medication Sig Dispense Refill    albuterol (VENTOLIN HFA) 90 mcg/actuation inhaler Inhale 2 puffs into the lungs every 6 (six) hours as needed for Wheezing. Rescue 18 g 0    ALPRAZolam (XANAX) 0.25 MG tablet Take 1 tablet (0.25 mg total) by mouth 3 (three) times daily as needed (anxiety). 60 tablet 0    ascorbic acid, vitamin C, (VITAMIN C) 500 MG tablet Take 1 tablet (500 mg total) by mouth 2 (two) times daily.      losartan (COZAAR) 50 MG tablet Take 1 tablet (50 mg total) by mouth once daily. 90 tablet 3    multivitamin Tab Take 1 tablet by mouth once daily. 30 tablet 11    pulse oximeter (PULSE OXIMETER) device by Apply Externally route 2 (two) times a day. Use twice daily at 8 AM and 3 PM and [facility] the [facility] as directed. 1 each 0    sildenafiL (VIAGRA) 100 MG tablet Take 1 tablet (100 mg total) by mouth daily as needed. 15 tablet 11    tamsulosin (FLOMAX) 0.4 mg Cap Take 1 capsule (0.4 mg total) by mouth once daily. 30 capsule 12     No current facility-administered medications on file prior to visit.       Family History   Problem Relation Age of Onset    Cancer Paternal Grandmother         colon cancer       Social History     Tobacco Use    Smoking status: Never Smoker    Smokeless tobacco: Never Used   Substance Use Topics    Alcohol use: Yes     Comment: socailly     Drug use: No        Review of Systems:  ROS:  GENERAL: No fever, chills, fatigability or weight loss.  Integument:  No rashes, redness, icterus  CHEST: Denies MARIE, cyanosis, wheezing, cough and sputum  "production.  CARDIOVASCULAR: Denies chest pain, PND, orthopnea or reduced exercise tolerance.  GI:  Denies abd pain, dysphagia, nausea, vomiting, no hematemesis, no rectal pain  : Denies burning on urination, no hematuria, no bacteriuria  MSK:  No deformities, swelling, joint pain swelling  Neurologic:  No HAs, seizures, weakness, paresthesias, gait problems      OBJECTIVE:     Vital Signs (Most Recent)  BP (!) 167/89 (BP Location: Left arm, Patient Position: Sitting, BP Method: Large (Automatic))   Pulse 96   Ht 6' 2" (1.88 m)   Wt 123.2 kg (271 lb 11.2 oz)   BMI 34.88 kg/m²     Physical Exam:  General: White male in no distress   Skin/ Sclera anicteric  LN none palpable  HEENT: anicteric, normocephalic, extraocular movements intact   Neck trachea midline, thyroid nl  Chest symmetric, nl excursions, no retractions  Respiratory: respirations are even and unlabored  COR RRR   Abdomen   soft NT ND.  no masses, no  Organomegaly  Extremities: Warm dry and intact.  NO CCE  Neuro: alert and oriented x 4.  Moves all extremities.         ASSESSMENT/PLAN:   Sigmoid colon cancer in a sessile polyp 5 cm - positive margin of neoplasia  No signs of distant metastases  Excellent performance status      Recommend  Robotic LAR  The indications for surgical resection have been explained to the patient and his wife.  We discussed the details of the procedure, expected hospital course andrecuperation, as well as the expected return to regular activities.  The functional consequences of  low anterior resection/ sigmoid colectomy were explained. The risks of bleeding, need for transfusion, risks of transfusion, infection, ileus, anastomotic leak, reoperation and ostomy creation were discussed in detail.          "

## 2021-12-16 DIAGNOSIS — K63.89 COLONIC MASS: Primary | ICD-10-CM

## 2021-12-20 DIAGNOSIS — Z01.818 PRE-OP TESTING: ICD-10-CM

## 2021-12-30 RX ORDER — ALPRAZOLAM 0.25 MG/1
0.25 TABLET ORAL 3 TIMES DAILY PRN
Qty: 60 TABLET | Refills: 0 | Status: SHIPPED | OUTPATIENT
Start: 2021-12-30 | End: 2022-11-09 | Stop reason: SDUPTHER

## 2022-01-10 ENCOUNTER — LAB VISIT (OUTPATIENT)
Dept: PRIMARY CARE CLINIC | Facility: CLINIC | Age: 52
DRG: 331 | End: 2022-01-10
Payer: COMMERCIAL

## 2022-01-10 DIAGNOSIS — Z01.818 PRE-OP TESTING: ICD-10-CM

## 2022-01-10 LAB
SARS-COV-2 RNA RESP QL NAA+PROBE: NOT DETECTED
SARS-COV-2- CYCLE NUMBER: NORMAL

## 2022-01-10 PROCEDURE — U0003 INFECTIOUS AGENT DETECTION BY NUCLEIC ACID (DNA OR RNA); SEVERE ACUTE RESPIRATORY SYNDROME CORONAVIRUS 2 (SARS-COV-2) (CORONAVIRUS DISEASE [COVID-19]), AMPLIFIED PROBE TECHNIQUE, MAKING USE OF HIGH THROUGHPUT TECHNOLOGIES AS DESCRIBED BY CMS-2020-01-R: HCPCS | Performed by: COLON & RECTAL SURGERY

## 2022-01-10 PROCEDURE — U0005 INFEC AGEN DETEC AMPLI PROBE: HCPCS | Performed by: COLON & RECTAL SURGERY

## 2022-01-12 ENCOUNTER — ANESTHESIA EVENT (OUTPATIENT)
Dept: SURGERY | Facility: HOSPITAL | Age: 52
DRG: 331 | End: 2022-01-12
Payer: COMMERCIAL

## 2022-01-12 ENCOUNTER — TELEPHONE (OUTPATIENT)
Dept: SURGERY | Facility: CLINIC | Age: 52
End: 2022-01-12
Payer: COMMERCIAL

## 2022-01-13 ENCOUNTER — ANESTHESIA (OUTPATIENT)
Dept: SURGERY | Facility: HOSPITAL | Age: 52
DRG: 331 | End: 2022-01-13
Payer: COMMERCIAL

## 2022-01-13 ENCOUNTER — HOSPITAL ENCOUNTER (INPATIENT)
Facility: HOSPITAL | Age: 52
LOS: 5 days | Discharge: HOME | DRG: 331 | End: 2022-01-18
Attending: COLON & RECTAL SURGERY | Admitting: COLON & RECTAL SURGERY
Payer: COMMERCIAL

## 2022-01-13 DIAGNOSIS — C18.7 MALIGNANT NEOPLASM OF SIGMOID COLON: Primary | ICD-10-CM

## 2022-01-13 DIAGNOSIS — K63.89 COLONIC MASS: ICD-10-CM

## 2022-01-13 LAB
ABO + RH BLD: NORMAL
ANION GAP SERPL CALC-SCNC: 8 MMOL/L (ref 8–16)
BASOPHILS # BLD AUTO: 0.02 K/UL (ref 0–0.2)
BASOPHILS NFR BLD: 0.1 % (ref 0–1.9)
BLD GP AB SCN CELLS X3 SERPL QL: NORMAL
BUN SERPL-MCNC: 12 MG/DL (ref 6–20)
CALCIUM SERPL-MCNC: 8.1 MG/DL (ref 8.7–10.5)
CHLORIDE SERPL-SCNC: 104 MMOL/L (ref 95–110)
CO2 SERPL-SCNC: 23 MMOL/L (ref 23–29)
CREAT SERPL-MCNC: 1 MG/DL (ref 0.5–1.4)
DIFFERENTIAL METHOD: ABNORMAL
EOSINOPHIL # BLD AUTO: 0 K/UL (ref 0–0.5)
EOSINOPHIL NFR BLD: 0.1 % (ref 0–8)
ERYTHROCYTE [DISTWIDTH] IN BLOOD BY AUTOMATED COUNT: 11.8 % (ref 11.5–14.5)
EST. GFR  (AFRICAN AMERICAN): >60 ML/MIN/1.73 M^2
EST. GFR  (NON AFRICAN AMERICAN): >60 ML/MIN/1.73 M^2
GLUCOSE SERPL-MCNC: 151 MG/DL (ref 70–110)
HCT VFR BLD AUTO: 41.3 % (ref 40–54)
HGB BLD-MCNC: 13.7 G/DL (ref 14–18)
IMM GRANULOCYTES # BLD AUTO: 0.04 K/UL (ref 0–0.04)
IMM GRANULOCYTES NFR BLD AUTO: 0.3 % (ref 0–0.5)
LYMPHOCYTES # BLD AUTO: 0.6 K/UL (ref 1–4.8)
LYMPHOCYTES NFR BLD: 3.8 % (ref 18–48)
MCH RBC QN AUTO: 32.5 PG (ref 27–31)
MCHC RBC AUTO-ENTMCNC: 33.2 G/DL (ref 32–36)
MCV RBC AUTO: 98 FL (ref 82–98)
MONOCYTES # BLD AUTO: 0.6 K/UL (ref 0.3–1)
MONOCYTES NFR BLD: 4.2 % (ref 4–15)
NEUTROPHILS # BLD AUTO: 13.4 K/UL (ref 1.8–7.7)
NEUTROPHILS NFR BLD: 91.5 % (ref 38–73)
NRBC BLD-RTO: 0 /100 WBC
PLATELET # BLD AUTO: 130 K/UL (ref 150–450)
PMV BLD AUTO: 11.2 FL (ref 9.2–12.9)
POTASSIUM SERPL-SCNC: 4.8 MMOL/L (ref 3.5–5.1)
RBC # BLD AUTO: 4.22 M/UL (ref 4.6–6.2)
SODIUM SERPL-SCNC: 135 MMOL/L (ref 136–145)
WBC # BLD AUTO: 14.63 K/UL (ref 3.9–12.7)

## 2022-01-13 PROCEDURE — 37000009 HC ANESTHESIA EA ADD 15 MINS: Performed by: COLON & RECTAL SURGERY

## 2022-01-13 PROCEDURE — 94799 UNLISTED PULMONARY SVC/PX: CPT

## 2022-01-13 PROCEDURE — 88307 TISSUE EXAM BY PATHOLOGIST: CPT | Performed by: PATHOLOGY

## 2022-01-13 PROCEDURE — 25000003 PHARM REV CODE 250: Performed by: NURSE PRACTITIONER

## 2022-01-13 PROCEDURE — 85025 COMPLETE CBC W/AUTO DIFF WBC: CPT

## 2022-01-13 PROCEDURE — 88305 TISSUE EXAM BY PATHOLOGIST: ICD-10-PCS | Mod: 26,,, | Performed by: PATHOLOGY

## 2022-01-13 PROCEDURE — 86901 BLOOD TYPING SEROLOGIC RH(D): CPT | Performed by: NURSE PRACTITIONER

## 2022-01-13 PROCEDURE — 88305 TISSUE EXAM BY PATHOLOGIST: CPT | Mod: 26,,, | Performed by: PATHOLOGY

## 2022-01-13 PROCEDURE — 63600175 PHARM REV CODE 636 W HCPCS: Performed by: STUDENT IN AN ORGANIZED HEALTH CARE EDUCATION/TRAINING PROGRAM

## 2022-01-13 PROCEDURE — S0030 INJECTION, METRONIDAZOLE: HCPCS | Performed by: NURSE PRACTITIONER

## 2022-01-13 PROCEDURE — 25000003 PHARM REV CODE 250: Performed by: ANESTHESIOLOGY

## 2022-01-13 PROCEDURE — 63600175 PHARM REV CODE 636 W HCPCS: Performed by: ANESTHESIOLOGY

## 2022-01-13 PROCEDURE — 88305 TISSUE EXAM BY PATHOLOGIST: CPT | Performed by: PATHOLOGY

## 2022-01-13 PROCEDURE — D9220A PRA ANESTHESIA: ICD-10-PCS | Mod: ,,, | Performed by: ANESTHESIOLOGY

## 2022-01-13 PROCEDURE — 36000713 HC OR TIME LEV V EA ADD 15 MIN: Performed by: COLON & RECTAL SURGERY

## 2022-01-13 PROCEDURE — 71000015 HC POSTOP RECOV 1ST HR: Performed by: COLON & RECTAL SURGERY

## 2022-01-13 PROCEDURE — 63600175 PHARM REV CODE 636 W HCPCS

## 2022-01-13 PROCEDURE — 25000003 PHARM REV CODE 250: Performed by: STUDENT IN AN ORGANIZED HEALTH CARE EDUCATION/TRAINING PROGRAM

## 2022-01-13 PROCEDURE — 71000016 HC POSTOP RECOV ADDL HR: Performed by: COLON & RECTAL SURGERY

## 2022-01-13 PROCEDURE — 20600001 HC STEP DOWN PRIVATE ROOM

## 2022-01-13 PROCEDURE — 80048 BASIC METABOLIC PNL TOTAL CA: CPT

## 2022-01-13 PROCEDURE — 25000003 PHARM REV CODE 250: Performed by: COLON & RECTAL SURGERY

## 2022-01-13 PROCEDURE — D9220A PRA ANESTHESIA: Mod: ,,, | Performed by: ANESTHESIOLOGY

## 2022-01-13 PROCEDURE — 44207 L COLECTOMY/COLOPROCTOSTOMY: CPT | Mod: ,,, | Performed by: COLON & RECTAL SURGERY

## 2022-01-13 PROCEDURE — 49905 PR OMENTAL FLAP,INTRA-ABDOMINAL: ICD-10-PCS | Mod: ,,, | Performed by: COLON & RECTAL SURGERY

## 2022-01-13 PROCEDURE — 63600175 PHARM REV CODE 636 W HCPCS: Performed by: NURSE PRACTITIONER

## 2022-01-13 PROCEDURE — 27201423 OPTIME MED/SURG SUP & DEVICES STERILE SUPPLY: Performed by: COLON & RECTAL SURGERY

## 2022-01-13 PROCEDURE — 71000033 HC RECOVERY, INTIAL HOUR: Performed by: COLON & RECTAL SURGERY

## 2022-01-13 PROCEDURE — 36000712 HC OR TIME LEV V 1ST 15 MIN: Performed by: COLON & RECTAL SURGERY

## 2022-01-13 PROCEDURE — 88307 TISSUE EXAM BY PATHOLOGIST: CPT | Mod: 26,,, | Performed by: PATHOLOGY

## 2022-01-13 PROCEDURE — 37000008 HC ANESTHESIA 1ST 15 MINUTES: Performed by: COLON & RECTAL SURGERY

## 2022-01-13 PROCEDURE — 99900035 HC TECH TIME PER 15 MIN (STAT)

## 2022-01-13 PROCEDURE — 44207 PR LAP,SURG,COLECTOMY,W/ANAST: ICD-10-PCS | Mod: ,,, | Performed by: COLON & RECTAL SURGERY

## 2022-01-13 PROCEDURE — 49905 OMENTAL FLAP INTRA-ABDOM: CPT | Mod: ,,, | Performed by: COLON & RECTAL SURGERY

## 2022-01-13 PROCEDURE — 94761 N-INVAS EAR/PLS OXIMETRY MLT: CPT

## 2022-01-13 PROCEDURE — 88307 PR  SURG PATH,LEVEL V: ICD-10-PCS | Mod: 26,,, | Performed by: PATHOLOGY

## 2022-01-13 RX ORDER — TAMSULOSIN HYDROCHLORIDE 0.4 MG/1
0.4 CAPSULE ORAL DAILY
Status: DISCONTINUED | OUTPATIENT
Start: 2022-01-13 | End: 2022-01-18 | Stop reason: HOSPADM

## 2022-01-13 RX ORDER — GABAPENTIN 300 MG/1
300 CAPSULE ORAL 3 TIMES DAILY
Status: DISPENSED | OUTPATIENT
Start: 2022-01-13

## 2022-01-13 RX ORDER — INDOCYANINE GREEN AND WATER 25 MG
KIT INJECTION
Status: DISCONTINUED | OUTPATIENT
Start: 2022-01-13 | End: 2022-01-13

## 2022-01-13 RX ORDER — ALVIMOPAN 12 MG/1
12 CAPSULE ORAL
Status: COMPLETED | OUTPATIENT
Start: 2022-01-13 | End: 2022-01-13

## 2022-01-13 RX ORDER — PROPOFOL 10 MG/ML
VIAL (ML) INTRAVENOUS
Status: DISCONTINUED | OUTPATIENT
Start: 2022-01-13 | End: 2022-01-13

## 2022-01-13 RX ORDER — FENTANYL CITRATE 50 UG/ML
INJECTION, SOLUTION INTRAMUSCULAR; INTRAVENOUS
Status: DISCONTINUED | OUTPATIENT
Start: 2022-01-13 | End: 2022-01-13

## 2022-01-13 RX ORDER — LIDOCAINE HYDROCHLORIDE 10 MG/ML
1 INJECTION, SOLUTION EPIDURAL; INFILTRATION; INTRACAUDAL; PERINEURAL
Status: DISCONTINUED | OUTPATIENT
Start: 2022-01-13 | End: 2022-01-13

## 2022-01-13 RX ORDER — HYDROMORPHONE HYDROCHLORIDE 2 MG/ML
INJECTION, SOLUTION INTRAMUSCULAR; INTRAVENOUS; SUBCUTANEOUS
Status: DISCONTINUED | OUTPATIENT
Start: 2022-01-13 | End: 2022-01-13

## 2022-01-13 RX ORDER — HYDROMORPHONE HYDROCHLORIDE 1 MG/ML
0.5 INJECTION, SOLUTION INTRAMUSCULAR; INTRAVENOUS; SUBCUTANEOUS ONCE
Status: COMPLETED | OUTPATIENT
Start: 2022-01-13 | End: 2022-01-13

## 2022-01-13 RX ORDER — OXYCODONE HYDROCHLORIDE 10 MG/1
10 TABLET ORAL EVERY 4 HOURS PRN
Status: DISCONTINUED | OUTPATIENT
Start: 2022-01-13 | End: 2022-01-17

## 2022-01-13 RX ORDER — TRAMADOL HYDROCHLORIDE 50 MG/1
50 TABLET ORAL EVERY 6 HOURS PRN
Status: DISCONTINUED | OUTPATIENT
Start: 2022-01-13 | End: 2022-01-18 | Stop reason: HOSPADM

## 2022-01-13 RX ORDER — ACETAMINOPHEN 500 MG
1000 TABLET ORAL EVERY 8 HOURS
Status: DISCONTINUED | OUTPATIENT
Start: 2022-01-14 | End: 2022-01-18 | Stop reason: HOSPADM

## 2022-01-13 RX ORDER — METRONIDAZOLE 500 MG/100ML
500 INJECTION, SOLUTION INTRAVENOUS
Status: COMPLETED | OUTPATIENT
Start: 2022-01-13 | End: 2022-01-13

## 2022-01-13 RX ORDER — ONDANSETRON 2 MG/ML
INJECTION INTRAMUSCULAR; INTRAVENOUS
Status: DISCONTINUED | OUTPATIENT
Start: 2022-01-13 | End: 2022-01-13

## 2022-01-13 RX ORDER — LIDOCAINE HYDROCHLORIDE 20 MG/ML
INJECTION, SOLUTION EPIDURAL; INFILTRATION; INTRACAUDAL; PERINEURAL
Status: DISCONTINUED | OUTPATIENT
Start: 2022-01-13 | End: 2022-01-13

## 2022-01-13 RX ORDER — IBUPROFEN 400 MG/1
800 TABLET ORAL EVERY 8 HOURS
Status: DISCONTINUED | OUTPATIENT
Start: 2022-01-14 | End: 2022-01-14

## 2022-01-13 RX ORDER — MIDAZOLAM HYDROCHLORIDE 1 MG/ML
INJECTION, SOLUTION INTRAMUSCULAR; INTRAVENOUS
Status: DISCONTINUED | OUTPATIENT
Start: 2022-01-13 | End: 2022-01-13

## 2022-01-13 RX ORDER — OXYCODONE HYDROCHLORIDE 5 MG/1
5 TABLET ORAL EVERY 4 HOURS PRN
Status: DISCONTINUED | OUTPATIENT
Start: 2022-01-13 | End: 2022-01-17

## 2022-01-13 RX ORDER — MUPIROCIN 20 MG/G
1 OINTMENT TOPICAL
Status: COMPLETED | OUTPATIENT
Start: 2022-01-13 | End: 2022-01-13

## 2022-01-13 RX ORDER — ALBUTEROL SULFATE 90 UG/1
2 AEROSOL, METERED RESPIRATORY (INHALATION) EVERY 6 HOURS PRN
Status: DISCONTINUED | OUTPATIENT
Start: 2022-01-13 | End: 2022-01-18 | Stop reason: HOSPADM

## 2022-01-13 RX ORDER — TRIPROLIDINE/PSEUDOEPHEDRINE 2.5MG-60MG
600 TABLET ORAL
Status: COMPLETED | OUTPATIENT
Start: 2022-01-13 | End: 2022-01-13

## 2022-01-13 RX ORDER — KETAMINE HCL IN 0.9 % NACL 50 MG/5 ML
SYRINGE (ML) INTRAVENOUS
Status: DISCONTINUED | OUTPATIENT
Start: 2022-01-13 | End: 2022-01-13

## 2022-01-13 RX ORDER — HALOPERIDOL 5 MG/ML
0.5 INJECTION INTRAMUSCULAR EVERY 10 MIN PRN
Status: DISCONTINUED | OUTPATIENT
Start: 2022-01-13 | End: 2022-01-13 | Stop reason: HOSPADM

## 2022-01-13 RX ORDER — LABETALOL HYDROCHLORIDE 5 MG/ML
INJECTION, SOLUTION INTRAVENOUS
Status: DISCONTINUED | OUTPATIENT
Start: 2022-01-13 | End: 2022-01-13

## 2022-01-13 RX ORDER — PROCHLORPERAZINE EDISYLATE 5 MG/ML
5 INJECTION INTRAMUSCULAR; INTRAVENOUS EVERY 6 HOURS PRN
Status: DISCONTINUED | OUTPATIENT
Start: 2022-01-13 | End: 2022-01-18 | Stop reason: HOSPADM

## 2022-01-13 RX ORDER — METOCLOPRAMIDE HYDROCHLORIDE 5 MG/ML
10 INJECTION INTRAMUSCULAR; INTRAVENOUS EVERY 10 MIN PRN
Status: DISCONTINUED | OUTPATIENT
Start: 2022-01-13 | End: 2022-01-13 | Stop reason: HOSPADM

## 2022-01-13 RX ORDER — SODIUM CHLORIDE 9 MG/ML
INJECTION, SOLUTION INTRAVENOUS
Status: DISCONTINUED | OUTPATIENT
Start: 2022-01-13 | End: 2022-01-13

## 2022-01-13 RX ORDER — MUPIROCIN 20 MG/G
OINTMENT TOPICAL 2 TIMES DAILY
Status: DISCONTINUED | OUTPATIENT
Start: 2022-01-13 | End: 2022-01-18 | Stop reason: HOSPADM

## 2022-01-13 RX ORDER — SODIUM CHLORIDE 0.9 % (FLUSH) 0.9 %
10 SYRINGE (ML) INJECTION
Status: DISCONTINUED | OUTPATIENT
Start: 2022-01-13 | End: 2022-01-18 | Stop reason: HOSPADM

## 2022-01-13 RX ORDER — NEOSTIGMINE METHYLSULFATE 0.5 MG/ML
INJECTION, SOLUTION INTRAVENOUS
Status: DISCONTINUED | OUTPATIENT
Start: 2022-01-13 | End: 2022-01-13

## 2022-01-13 RX ORDER — SODIUM CHLORIDE 9 MG/ML
INJECTION, SOLUTION INTRAVENOUS CONTINUOUS
Status: DISCONTINUED | OUTPATIENT
Start: 2022-01-13 | End: 2022-01-14

## 2022-01-13 RX ORDER — ACETAMINOPHEN 650 MG/20.3ML
975 LIQUID ORAL
Status: COMPLETED | OUTPATIENT
Start: 2022-01-13 | End: 2022-01-13

## 2022-01-13 RX ORDER — ONDANSETRON 2 MG/ML
4 INJECTION INTRAMUSCULAR; INTRAVENOUS EVERY 6 HOURS PRN
Status: DISCONTINUED | OUTPATIENT
Start: 2022-01-13 | End: 2022-01-18 | Stop reason: HOSPADM

## 2022-01-13 RX ORDER — BUPIVACAINE HYDROCHLORIDE AND EPINEPHRINE 2.5; 5 MG/ML; UG/ML
INJECTION, SOLUTION EPIDURAL; INFILTRATION; INTRACAUDAL; PERINEURAL
Status: DISCONTINUED | OUTPATIENT
Start: 2022-01-13 | End: 2022-01-13 | Stop reason: HOSPADM

## 2022-01-13 RX ORDER — HEPARIN SODIUM 5000 [USP'U]/ML
5000 INJECTION, SOLUTION INTRAVENOUS; SUBCUTANEOUS EVERY 8 HOURS
Status: COMPLETED | OUTPATIENT
Start: 2022-01-13 | End: 2022-01-13

## 2022-01-13 RX ORDER — DEXAMETHASONE SODIUM PHOSPHATE 4 MG/ML
INJECTION, SOLUTION INTRA-ARTICULAR; INTRALESIONAL; INTRAMUSCULAR; INTRAVENOUS; SOFT TISSUE
Status: DISCONTINUED | OUTPATIENT
Start: 2022-01-13 | End: 2022-01-13

## 2022-01-13 RX ORDER — ROCURONIUM BROMIDE 10 MG/ML
INJECTION, SOLUTION INTRAVENOUS
Status: DISCONTINUED | OUTPATIENT
Start: 2022-01-13 | End: 2022-01-13

## 2022-01-13 RX ORDER — ALVIMOPAN 12 MG/1
12 CAPSULE ORAL 2 TIMES DAILY
Status: DISCONTINUED | OUTPATIENT
Start: 2022-01-14 | End: 2022-01-18 | Stop reason: HOSPADM

## 2022-01-13 RX ORDER — HYDROMORPHONE HYDROCHLORIDE 1 MG/ML
0.2 INJECTION, SOLUTION INTRAMUSCULAR; INTRAVENOUS; SUBCUTANEOUS EVERY 5 MIN PRN
Status: DISCONTINUED | OUTPATIENT
Start: 2022-01-13 | End: 2022-01-13 | Stop reason: HOSPADM

## 2022-01-13 RX ORDER — SODIUM CHLORIDE 0.9 % (FLUSH) 0.9 %
10 SYRINGE (ML) INJECTION
Status: DISCONTINUED | OUTPATIENT
Start: 2022-01-13 | End: 2022-01-17

## 2022-01-13 RX ORDER — GABAPENTIN 300 MG/1
300 CAPSULE ORAL 3 TIMES DAILY
Status: DISCONTINUED | OUTPATIENT
Start: 2022-01-13 | End: 2022-01-13

## 2022-01-13 RX ORDER — GABAPENTIN 300 MG/1
300 CAPSULE ORAL
Status: COMPLETED | OUTPATIENT
Start: 2022-01-13 | End: 2022-01-13

## 2022-01-13 RX ORDER — ACETAMINOPHEN 10 MG/ML
1000 INJECTION, SOLUTION INTRAVENOUS EVERY 8 HOURS
Status: DISPENSED | OUTPATIENT
Start: 2022-01-13 | End: 2022-01-14

## 2022-01-13 RX ADMIN — SODIUM CHLORIDE, SODIUM GLUCONATE, SODIUM ACETATE, POTASSIUM CHLORIDE, MAGNESIUM CHLORIDE, SODIUM PHOSPHATE, DIBASIC, AND POTASSIUM PHOSPHATE: .53; .5; .37; .037; .03; .012; .00082 INJECTION, SOLUTION INTRAVENOUS at 07:01

## 2022-01-13 RX ADMIN — GABAPENTIN 300 MG: 300 CAPSULE ORAL at 06:01

## 2022-01-13 RX ADMIN — GABAPENTIN 300 MG: 300 CAPSULE ORAL at 08:01

## 2022-01-13 RX ADMIN — ROCURONIUM BROMIDE 10 MG: 10 INJECTION, SOLUTION INTRAVENOUS at 12:01

## 2022-01-13 RX ADMIN — HYDROMORPHONE HYDROCHLORIDE 0.2 MG: 1 INJECTION, SOLUTION INTRAMUSCULAR; INTRAVENOUS; SUBCUTANEOUS at 03:01

## 2022-01-13 RX ADMIN — ROCURONIUM BROMIDE 10 MG: 10 INJECTION, SOLUTION INTRAVENOUS at 10:01

## 2022-01-13 RX ADMIN — OXYCODONE HYDROCHLORIDE 10 MG: 10 TABLET ORAL at 03:01

## 2022-01-13 RX ADMIN — ACETAMINOPHEN 1000 MG: 10 INJECTION, SOLUTION INTRAVENOUS at 02:01

## 2022-01-13 RX ADMIN — SODIUM CHLORIDE: 0.9 INJECTION, SOLUTION INTRAVENOUS at 07:01

## 2022-01-13 RX ADMIN — OXYCODONE HYDROCHLORIDE 10 MG: 10 TABLET ORAL at 10:01

## 2022-01-13 RX ADMIN — Medication 30 MG: at 07:01

## 2022-01-13 RX ADMIN — HYDROMORPHONE HYDROCHLORIDE 0.4 MG: 2 INJECTION INTRAMUSCULAR; INTRAVENOUS; SUBCUTANEOUS at 11:01

## 2022-01-13 RX ADMIN — ROCURONIUM BROMIDE 10 MG: 10 INJECTION, SOLUTION INTRAVENOUS at 09:01

## 2022-01-13 RX ADMIN — METRONIDAZOLE 500 MG: 500 INJECTION, SOLUTION INTRAVENOUS at 07:01

## 2022-01-13 RX ADMIN — HYDROMORPHONE HYDROCHLORIDE 0.5 MG: 1 INJECTION, SOLUTION INTRAMUSCULAR; INTRAVENOUS; SUBCUTANEOUS at 08:01

## 2022-01-13 RX ADMIN — TRAMADOL HYDROCHLORIDE 50 MG: 50 TABLET, COATED ORAL at 06:01

## 2022-01-13 RX ADMIN — ACETAMINOPHEN 976.6 MG: 160 SOLUTION ORAL at 06:01

## 2022-01-13 RX ADMIN — ACETAMINOPHEN 1000 MG: 10 INJECTION, SOLUTION INTRAVENOUS at 09:01

## 2022-01-13 RX ADMIN — NEOSTIGMINE METHYLSULFATE 4 MG: 0.5 INJECTION INTRAVENOUS at 01:01

## 2022-01-13 RX ADMIN — DEXAMETHASONE SODIUM PHOSPHATE 4 MG: 4 INJECTION INTRA-ARTICULAR; INTRALESIONAL; INTRAMUSCULAR; INTRAVENOUS; SOFT TISSUE at 07:01

## 2022-01-13 RX ADMIN — OXYCODONE 5 MG: 5 TABLET ORAL at 05:01

## 2022-01-13 RX ADMIN — HEPARIN SODIUM 5000 UNITS: 5000 INJECTION INTRAVENOUS; SUBCUTANEOUS at 06:01

## 2022-01-13 RX ADMIN — IBUPROFEN 800 MG: 800 INJECTION INTRAVENOUS at 10:01

## 2022-01-13 RX ADMIN — MIDAZOLAM 2 MG: 1 INJECTION INTRAMUSCULAR; INTRAVENOUS at 06:01

## 2022-01-13 RX ADMIN — IBUPROFEN 600 MG: 100 SUSPENSION ORAL at 06:01

## 2022-01-13 RX ADMIN — MUPIROCIN 1 G: 20 OINTMENT TOPICAL at 06:01

## 2022-01-13 RX ADMIN — HYDROMORPHONE HYDROCHLORIDE 0.2 MG: 2 INJECTION INTRAMUSCULAR; INTRAVENOUS; SUBCUTANEOUS at 01:01

## 2022-01-13 RX ADMIN — GLYCOPYRROLATE 0.8 MG: 0.2 INJECTION INTRAMUSCULAR; INTRAVENOUS at 01:01

## 2022-01-13 RX ADMIN — ALVIMOPAN 12 MG: 12 CAPSULE ORAL at 06:01

## 2022-01-13 RX ADMIN — ROCURONIUM BROMIDE 20 MG: 10 INJECTION, SOLUTION INTRAVENOUS at 08:01

## 2022-01-13 RX ADMIN — LABETALOL HYDROCHLORIDE 5 MG: 5 INJECTION, SOLUTION INTRAVENOUS at 12:01

## 2022-01-13 RX ADMIN — HYDROMORPHONE HYDROCHLORIDE 0.2 MG: 1 INJECTION, SOLUTION INTRAMUSCULAR; INTRAVENOUS; SUBCUTANEOUS at 04:01

## 2022-01-13 RX ADMIN — Medication 20 MG: at 07:01

## 2022-01-13 RX ADMIN — CEFTRIAXONE 2 G: 2 INJECTION, SOLUTION INTRAVENOUS at 07:01

## 2022-01-13 RX ADMIN — FENTANYL CITRATE 100 MCG: 50 INJECTION INTRAMUSCULAR; INTRAVENOUS at 07:01

## 2022-01-13 RX ADMIN — GABAPENTIN 300 MG: 300 CAPSULE ORAL at 03:01

## 2022-01-13 RX ADMIN — ROCURONIUM BROMIDE 20 MG: 10 INJECTION, SOLUTION INTRAVENOUS at 09:01

## 2022-01-13 RX ADMIN — MUPIROCIN: 20 OINTMENT TOPICAL at 08:01

## 2022-01-13 RX ADMIN — INDOCYANINE GREEN AND WATER 25 MG: KIT at 10:01

## 2022-01-13 RX ADMIN — PROPOFOL 150 MG: 10 INJECTION, EMULSION INTRAVENOUS at 07:01

## 2022-01-13 RX ADMIN — SODIUM CHLORIDE: 0.9 INJECTION, SOLUTION INTRAVENOUS at 01:01

## 2022-01-13 RX ADMIN — TAMSULOSIN HYDROCHLORIDE 0.4 MG: 0.4 CAPSULE ORAL at 02:01

## 2022-01-13 RX ADMIN — ONDANSETRON 4 MG: 2 INJECTION INTRAMUSCULAR; INTRAVENOUS at 07:01

## 2022-01-13 RX ADMIN — HYDROMORPHONE HYDROCHLORIDE 0.6 MG: 2 INJECTION INTRAMUSCULAR; INTRAVENOUS; SUBCUTANEOUS at 07:01

## 2022-01-13 RX ADMIN — ROCURONIUM BROMIDE 20 MG: 10 INJECTION, SOLUTION INTRAVENOUS at 11:01

## 2022-01-13 RX ADMIN — ROCURONIUM BROMIDE 60 MG: 10 INJECTION, SOLUTION INTRAVENOUS at 07:01

## 2022-01-13 RX ADMIN — LIDOCAINE HYDROCHLORIDE 100 MG: 20 INJECTION, SOLUTION EPIDURAL; INFILTRATION; INTRACAUDAL at 07:01

## 2022-01-13 NOTE — NURSING TRANSFER
Nursing Transfer Note      1/13/2022     Reason patient is being transferred: post procedure    Transfer To: 1058      Transfer via bed    Transfer with n/a    Transported by transport    Medicines sent:n/a    Any special needs or follow-up needed: n/a    Chart send with patient: Yes    Notified: spouse    Patient reassessed at: 1/13/22 @ 4687

## 2022-01-13 NOTE — ANESTHESIA PREPROCEDURE EVALUATION
01/13/2022  Pre-operative evaluation for Procedure(s) (LRB):  XI ROBOTIC COLECTOMY, SIGMOID (N/A)    Jim Brown is a 51 y.o. male     Patient Active Problem List   Diagnosis    Hypertension    Foot pain: Dr. Simon    Hyperlipidemia    Decreased platelet count    Screening for colon cancer    BMI 34.0-34.9,adult    Pneumonia due to COVID-19 virus    Malignant neoplasm of sigmoid colon       Review of patient's allergies indicates:   Allergen Reactions    Lisinopril Other (See Comments)       No current facility-administered medications on file prior to encounter.     Current Outpatient Medications on File Prior to Encounter   Medication Sig Dispense Refill    ascorbic acid, vitamin C, (VITAMIN C) 500 MG tablet Take 1 tablet (500 mg total) by mouth 2 (two) times daily.      losartan (COZAAR) 50 MG tablet Take 1 tablet (50 mg total) by mouth once daily. 90 tablet 3    metroNIDAZOLE (FLAGYL) 500 MG tablet Take 1 tablet at 1pm, 2pm, and 11pm the day before surgery. 3 tablet 0    multivitamin Tab Take 1 tablet by mouth once daily. 30 tablet 11    neomycin (MYCIFRADIN) 500 mg Tab Take 2 tablets at 1pm, 2pm, and 11pm the day before surgery. 6 tablet 0    polyethylene glycol (GLYCOLAX) 17 gram/dose powder 3pm - Day before procedure: Drink 8-8 oz glasses of clear liquid with one cap of Miralax in each glass.  (One every 15-20 minutes.) If bowels are not clear - repeat 2-3 more caps until clear. 238 g 0    pulse oximeter (PULSE OXIMETER) device by Apply Externally route 2 (two) times a day. Use twice daily at 8 AM and 3 PM and [facility] the [facility] as directed. 1 each 0    sildenafiL (VIAGRA) 100 MG tablet Take 1 tablet (100 mg total) by mouth daily as needed. 15 tablet 11    tamsulosin (FLOMAX) 0.4 mg Cap Take 1 capsule (0.4 mg total) by mouth once daily. 30 capsule 12    albuterol  (VENTOLIN HFA) 90 mcg/actuation inhaler Inhale 2 puffs into the lungs every 6 (six) hours as needed for Wheezing. Rescue 18 g 0       Past Surgical History:   Procedure Laterality Date    COLONOSCOPY  8/10/15    COLONOSCOPY N/A 2021    Procedure: COLONOSCOPY;  Surgeon: GM Webber MD;  Location: [facility] ([address] [place] [address]);  Service: Endoscopy;  Laterality: N/A;  Covid test  Casey, yulisa emailed to [email or web address]    TONSILLECTOMY         Social History     Socioeconomic History    Marital status:    Tobacco Use    Smoking status: Never Smoker    Smokeless tobacco: Never Used   Substance and Sexual Activity    Alcohol use: Yes     Comment: socailly     Drug use: No         CBC: No results for input(s): WBC, RBC, HGB, HCT, PLT, MCV, MCH, MCHC in the last 72 hours.    CMP: No results for input(s): NA, K, CL, CO2, BUN, CREATININE, GLU, MG, PHOS, CALCIUM, ALBUMIN, PROT, ALKPHOS, ALT, AST, BILITOT in the last 72 hours.    INR  No results for input(s): PT, INR, PROTIME, APTT in the last 72 hours.        Diagnostic Studies:      EKD Echo:  21  · The left ventricle is normal in size with normal systolic function. The estimated ejection fraction is 63%  · Normal left ventricular diastolic function.  · Mild left atrial enlargement.  · Normal right ventricular size with normal right ventricular systolic function.  · Moderate right atrial enlargement.  · Normal central venous pressure (3 mmHg).  · The estimated PA systolic pressure is 30 mmHg.  · The ascending aorta is mildly dilated.        Anesthesia Evaluation    I have reviewed the Patient Summary Reports.    I have reviewed the Nursing Notes. I have reviewed the NPO Status.      Review of Systems  Hematology/Oncology:        Current/Recent Cancer. (sigmoid)   EENT/Dental:EENT/Dental Normal   Cardiovascular:   Hypertension hyperlipidemia    Renal/:  Renal/ Normal         Physical Exam  General:  Obesity     Airway/Jaw/Neck:  Airway Findings: Mouth Opening: Normal Tongue: Normal  General Airway Assessment: Adult  Mallampati: II  TM Distance: Normal, at least 6 cm  Jaw/Neck Findings:     Neck ROM: Normal ROM      Dental:  Dental Findings: In tactNo active dental issues.   Chest/Lungs:  Chest/Lungs Findings: Clear to auscultation, Normal Respiratory Rate     Heart/Vascular:  Heart Findings: Rate: Normal  Rhythm: Regular Rhythm  Sounds: Normal     Abdomen:  Abdomen Findings:  Normal       Mental Status:  Mental Status Findings:  Cooperative, Alert and Oriented         Anesthesia Plan  Type of Anesthesia, risks & benefits discussed:  Anesthesia Type:  general    Patient's Preference:   Plan Factors:          Intra-op Monitoring Plan: standard ASA monitors  Intra-op Monitoring Plan Comments:   Post Op Pain Control Plan: multimodal analgesia  Post Op Pain Control Plan Comments:     Induction:   IV  Beta Blocker:         Informed Consent: Patient understands risks and agrees with Anesthesia plan.  Questions answered. Anesthesia consent signed with patient.  ASA Score: 3     Day of Surgery Review of History & Physical:    H&P update referred to the surgeon.         Ready For Surgery From Anesthesia Perspective.

## 2022-01-13 NOTE — TRANSFER OF CARE
"Anesthesia Transfer of Care Note    Patient: Jim Brown    Procedure(s) Performed: Procedure(s):  ROBOTIC RESECTION, COLON, LOW ANTERIOR  MOBILIZATION, SPLENIC FLEXURE  WRAP-OMENTAL  SIGMOIDOSCOPY, FLEXIBLE    Patient location: PACU    Transport from OR: Transported from OR on 6-10 L/min O2 by face mask with adequate spontaneous ventilation    Post pain: adequate analgesia    Post assessment: no apparent anesthetic complications and tolerated procedure well    Post vital signs: stable    Level of consciousness: sedated    Nausea/Vomiting: no nausea/vomiting    Complications: none    Transfer of care protocol was followed      Last vitals:   Visit Vitals  /87   Pulse 80   Temp 36.3 °C (97.4 °F)   Resp 16   Ht 6' 2" (1.88 m)   Wt 123.2 kg (271 lb 9.7 oz)   SpO2 98%   BMI 34.87 kg/m²     "

## 2022-01-13 NOTE — PLAN OF CARE
Robot time out completed with pre incision time out.  All DaVinci instruments inspected before case by Amanda Turner  .  All instruments appear to be intact.      All DaVinci instruments inspected after case by Amanda Turner.  All Instruments appear intact.  Lives checked and good.

## 2022-01-13 NOTE — BRIEF OP NOTE
[facility] (Formerly [facility])  Brief Operative Note    SUMMARY     Surgery Date: 1/13/2022     Surgeon(s) and Role:     * GM Webber MD - Primary     * Mio Deng MD - Resident - Assisting     * Robin Peter MD - Resident - Assisting    Pre-op Diagnosis:  Colonic mass [K63.89]    Post-op Diagnosis:  Post-Op Diagnosis Codes:     * Colonic mass [K63.89]    Procedure(s):  ROBOTIC RESECTION, COLON, LOW ANTERIOR  MOBILIZATION, SPLENIC FLEXURE  WRAP-OMENTAL  SIGMOIDOSCOPY, FLEXIBLE    Anesthesia: General    Operative Findings: Tattoo 18cm from anal verge   Stapled end to end colorectal anastomosis with negative leak test    Estimated Blood Loss: 50 mL    Estimated Blood Loss has been documented.         Specimens:   Specimen (24h ago, onward)             Start     Ordered    01/13/22 1201  Specimen to Pathology, Surgery Gastrointestinal tract  Once        Comments: Pre-op Diagnosis: Colonic mass [K63.89]    Procedure(s):  XI ROBOTIC COLECTOMY, SIGMOID  COLONOSCOPY     Number of specimens: 1    Name of specimens: 1. Portion of Rectum and Sigmoid Colon - Permanent; (stitch at Polypectomy) 2. Proximal Donut - Permanent; 3. Distal Donut - Permanent.   References:    Click here for ordering Quick Tip   Question Answer Comment   Procedure Type: Gastrointestinal tract    Specimen Class: Routine/Screening    Release to patient Immediate        01/13/22 1258                [id number]

## 2022-01-14 LAB
ANION GAP SERPL CALC-SCNC: 8 MMOL/L (ref 8–16)
BASOPHILS # BLD AUTO: 0.02 K/UL (ref 0–0.2)
BASOPHILS NFR BLD: 0.2 % (ref 0–1.9)
BUN SERPL-MCNC: 9 MG/DL (ref 6–20)
CALCIUM SERPL-MCNC: 8.4 MG/DL (ref 8.7–10.5)
CHLORIDE SERPL-SCNC: 105 MMOL/L (ref 95–110)
CO2 SERPL-SCNC: 24 MMOL/L (ref 23–29)
CREAT SERPL-MCNC: 0.9 MG/DL (ref 0.5–1.4)
DIFFERENTIAL METHOD: ABNORMAL
EOSINOPHIL # BLD AUTO: 0 K/UL (ref 0–0.5)
EOSINOPHIL NFR BLD: 0.2 % (ref 0–8)
ERYTHROCYTE [DISTWIDTH] IN BLOOD BY AUTOMATED COUNT: 11.8 % (ref 11.5–14.5)
EST. GFR  (AFRICAN AMERICAN): >60 ML/MIN/1.73 M^2
EST. GFR  (NON AFRICAN AMERICAN): >60 ML/MIN/1.73 M^2
GLUCOSE SERPL-MCNC: 107 MG/DL (ref 70–110)
HCT VFR BLD AUTO: 40 % (ref 40–54)
HGB BLD-MCNC: 13 G/DL (ref 14–18)
IMM GRANULOCYTES # BLD AUTO: 0.02 K/UL (ref 0–0.04)
IMM GRANULOCYTES NFR BLD AUTO: 0.2 % (ref 0–0.5)
LYMPHOCYTES # BLD AUTO: 1.8 K/UL (ref 1–4.8)
LYMPHOCYTES NFR BLD: 21.5 % (ref 18–48)
MAGNESIUM SERPL-MCNC: 2 MG/DL (ref 1.6–2.6)
MCH RBC QN AUTO: 32 PG (ref 27–31)
MCHC RBC AUTO-ENTMCNC: 32.5 G/DL (ref 32–36)
MCV RBC AUTO: 99 FL (ref 82–98)
MONOCYTES # BLD AUTO: 0.9 K/UL (ref 0.3–1)
MONOCYTES NFR BLD: 11.5 % (ref 4–15)
NEUTROPHILS # BLD AUTO: 5.4 K/UL (ref 1.8–7.7)
NEUTROPHILS NFR BLD: 66.4 % (ref 38–73)
NRBC BLD-RTO: 0 /100 WBC
PHOSPHATE SERPL-MCNC: 3.3 MG/DL (ref 2.7–4.5)
PLATELET # BLD AUTO: 141 K/UL (ref 150–450)
PMV BLD AUTO: 11.1 FL (ref 9.2–12.9)
POTASSIUM SERPL-SCNC: 3.7 MMOL/L (ref 3.5–5.1)
RBC # BLD AUTO: 4.06 M/UL (ref 4.6–6.2)
SODIUM SERPL-SCNC: 137 MMOL/L (ref 136–145)
WBC # BLD AUTO: 8.17 K/UL (ref 3.9–12.7)

## 2022-01-14 PROCEDURE — 84100 ASSAY OF PHOSPHORUS: CPT | Performed by: STUDENT IN AN ORGANIZED HEALTH CARE EDUCATION/TRAINING PROGRAM

## 2022-01-14 PROCEDURE — 83735 ASSAY OF MAGNESIUM: CPT | Performed by: STUDENT IN AN ORGANIZED HEALTH CARE EDUCATION/TRAINING PROGRAM

## 2022-01-14 PROCEDURE — 97161 PT EVAL LOW COMPLEX 20 MIN: CPT

## 2022-01-14 PROCEDURE — 63600175 PHARM REV CODE 636 W HCPCS: Performed by: COLON & RECTAL SURGERY

## 2022-01-14 PROCEDURE — 97116 GAIT TRAINING THERAPY: CPT

## 2022-01-14 PROCEDURE — 80048 BASIC METABOLIC PNL TOTAL CA: CPT | Performed by: STUDENT IN AN ORGANIZED HEALTH CARE EDUCATION/TRAINING PROGRAM

## 2022-01-14 PROCEDURE — 36415 COLL VENOUS BLD VENIPUNCTURE: CPT | Performed by: STUDENT IN AN ORGANIZED HEALTH CARE EDUCATION/TRAINING PROGRAM

## 2022-01-14 PROCEDURE — 63600175 PHARM REV CODE 636 W HCPCS: Performed by: STUDENT IN AN ORGANIZED HEALTH CARE EDUCATION/TRAINING PROGRAM

## 2022-01-14 PROCEDURE — 20600001 HC STEP DOWN PRIVATE ROOM

## 2022-01-14 PROCEDURE — 85025 COMPLETE CBC W/AUTO DIFF WBC: CPT | Performed by: STUDENT IN AN ORGANIZED HEALTH CARE EDUCATION/TRAINING PROGRAM

## 2022-01-14 PROCEDURE — 25000003 PHARM REV CODE 250: Performed by: STUDENT IN AN ORGANIZED HEALTH CARE EDUCATION/TRAINING PROGRAM

## 2022-01-14 PROCEDURE — 25000003 PHARM REV CODE 250: Performed by: NURSE PRACTITIONER

## 2022-01-14 RX ORDER — ENOXAPARIN SODIUM 100 MG/ML
40 INJECTION SUBCUTANEOUS EVERY 24 HOURS
Status: DISCONTINUED | OUTPATIENT
Start: 2022-01-14 | End: 2022-01-18 | Stop reason: HOSPADM

## 2022-01-14 RX ORDER — KETOROLAC TROMETHAMINE 15 MG/ML
15 INJECTION, SOLUTION INTRAMUSCULAR; INTRAVENOUS EVERY 6 HOURS
Status: DISCONTINUED | OUTPATIENT
Start: 2022-01-14 | End: 2022-01-14

## 2022-01-14 RX ORDER — LIDOCAINE 50 MG/G
1 PATCH TOPICAL
Status: DISCONTINUED | OUTPATIENT
Start: 2022-01-14 | End: 2022-01-18 | Stop reason: HOSPADM

## 2022-01-14 RX ORDER — METHOCARBAMOL 500 MG/1
500 TABLET, FILM COATED ORAL 4 TIMES DAILY
Status: DISCONTINUED | OUTPATIENT
Start: 2022-01-14 | End: 2022-01-18 | Stop reason: HOSPADM

## 2022-01-14 RX ORDER — KETOROLAC TROMETHAMINE 30 MG/ML
30 INJECTION, SOLUTION INTRAMUSCULAR; INTRAVENOUS EVERY 6 HOURS
Status: DISCONTINUED | OUTPATIENT
Start: 2022-01-14 | End: 2022-01-14

## 2022-01-14 RX ORDER — HYDROMORPHONE HYDROCHLORIDE 1 MG/ML
0.5 INJECTION, SOLUTION INTRAMUSCULAR; INTRAVENOUS; SUBCUTANEOUS EVERY 4 HOURS PRN
Status: DISCONTINUED | OUTPATIENT
Start: 2022-01-14 | End: 2022-01-17

## 2022-01-14 RX ORDER — KETOROLAC TROMETHAMINE 30 MG/ML
30 INJECTION, SOLUTION INTRAMUSCULAR; INTRAVENOUS EVERY 6 HOURS
Status: COMPLETED | OUTPATIENT
Start: 2022-01-14 | End: 2022-01-17

## 2022-01-14 RX ADMIN — OXYCODONE HYDROCHLORIDE 10 MG: 10 TABLET ORAL at 06:01

## 2022-01-14 RX ADMIN — LIDOCAINE 5% 1 PATCH: 700 PATCH TOPICAL at 02:01

## 2022-01-14 RX ADMIN — METHOCARBAMOL 500 MG: 500 TABLET ORAL at 02:01

## 2022-01-14 RX ADMIN — KETOROLAC TROMETHAMINE 30 MG: 30 INJECTION, SOLUTION INTRAMUSCULAR; INTRAVENOUS at 05:01

## 2022-01-14 RX ADMIN — GABAPENTIN 300 MG: 300 CAPSULE ORAL at 02:01

## 2022-01-14 RX ADMIN — ALVIMOPAN 12 MG: 12 CAPSULE ORAL at 09:01

## 2022-01-14 RX ADMIN — ENOXAPARIN SODIUM 40 MG: 100 INJECTION SUBCUTANEOUS at 05:01

## 2022-01-14 RX ADMIN — METHOCARBAMOL 500 MG: 500 TABLET ORAL at 09:01

## 2022-01-14 RX ADMIN — KETOROLAC TROMETHAMINE 30 MG: 30 INJECTION, SOLUTION INTRAMUSCULAR; INTRAVENOUS at 04:01

## 2022-01-14 RX ADMIN — IBUPROFEN 800 MG: 800 INJECTION INTRAVENOUS at 05:01

## 2022-01-14 RX ADMIN — TAMSULOSIN HYDROCHLORIDE 0.4 MG: 0.4 CAPSULE ORAL at 09:01

## 2022-01-14 RX ADMIN — METHOCARBAMOL 500 MG: 500 TABLET ORAL at 05:01

## 2022-01-14 RX ADMIN — MUPIROCIN: 20 OINTMENT TOPICAL at 09:01

## 2022-01-14 RX ADMIN — TRAMADOL HYDROCHLORIDE 50 MG: 50 TABLET, COATED ORAL at 09:01

## 2022-01-14 RX ADMIN — HYDROMORPHONE HYDROCHLORIDE 0.5 MG: 1 INJECTION, SOLUTION INTRAMUSCULAR; INTRAVENOUS; SUBCUTANEOUS at 09:01

## 2022-01-14 RX ADMIN — KETOROLAC TROMETHAMINE 15 MG: 15 INJECTION, SOLUTION INTRAMUSCULAR; INTRAVENOUS at 11:01

## 2022-01-14 RX ADMIN — TRAMADOL HYDROCHLORIDE 50 MG: 50 TABLET, COATED ORAL at 12:01

## 2022-01-14 RX ADMIN — OXYCODONE 5 MG: 5 TABLET ORAL at 09:01

## 2022-01-14 RX ADMIN — GABAPENTIN 300 MG: 300 CAPSULE ORAL at 09:01

## 2022-01-14 RX ADMIN — ACETAMINOPHEN 1000 MG: 500 TABLET ORAL at 09:01

## 2022-01-14 RX ADMIN — ACETAMINOPHEN 1000 MG: 500 TABLET ORAL at 02:01

## 2022-01-14 RX ADMIN — OXYCODONE HYDROCHLORIDE 10 MG: 10 TABLET ORAL at 02:01

## 2022-01-14 RX ADMIN — HYDROMORPHONE HYDROCHLORIDE 0.5 MG: 1 INJECTION, SOLUTION INTRAMUSCULAR; INTRAVENOUS; SUBCUTANEOUS at 02:01

## 2022-01-14 NOTE — SUBJECTIVE & OBJECTIVE
Subjective:     Interval History: No acute events overnight  Pain was an issue but improving. In chair this AM  Tolerating CLD without N/V  Good UOP    Post-Op Info:  Procedure(s):  ROBOTIC RESECTION, COLON, LOW ANTERIOR  MOBILIZATION, SPLENIC FLEXURE  WRAP-OMENTAL  SIGMOIDOSCOPY, FLEXIBLE   1 Day Post-Op      Medications:  Continuous Infusions:  Scheduled Meds:   acetaminophen  1,000 mg Intravenous Q8H    Followed by    acetaminophen  1,000 mg Oral Q8H    alvimopan  12 mg Oral BID    enoxaparin  40 mg Subcutaneous Daily    gabapentin  300 mg Oral TID    ketorolac  15 mg Intravenous Q6H    methocarbamoL  500 mg Oral QID    mupirocin   Nasal BID    tamsulosin  0.4 mg Oral Daily     PRN Meds:   albuterol    ondansetron    oxyCODONE    oxyCODONE    prochlorperazine    sodium chloride 0.9%    sodium chloride 0.9%    traMADoL        Objective:     Vital Signs (Most Recent):  Temp: 98 °F (36.7 °C) (01/14/22 0412)  Pulse: 71 (01/14/22 0412)  Resp: 16 (01/14/22 0602)  BP: 130/78 (01/14/22 0412)  SpO2: (!) 94 % (01/14/22 0412) Vital Signs (24h Range):  Temp:  [97 °F (36.1 °C)-98.8 °F (37.1 °C)] 98 °F (36.7 °C)  Pulse:  [61-88] 71  Resp:  [11-18] 16  SpO2:  [92 %-100 %] 94 %  BP: ()/(53-78) 130/78     Intake/Output - Last 3 Shifts       01/12 0700  01/13 0659 01/13 0700  01/14 0659 01/14 0700  01/15 0659    P.O.  180     I.V. (mL/kg)  651.3 (5.2)     IV Piggyback  1946.5     Total Intake(mL/kg)  2777.8 (22.2)     Urine (mL/kg/hr)  2475 (0.8)     Emesis/NG output  0     Stool  0     Blood  50     Total Output  2525     Net  +252.8            Stool Occurrence  0 x           Physical Exam  Vitals and nursing note reviewed.   Constitutional:       General: He is not in acute distress.     Appearance: Normal appearance.   HENT:      Head: Normocephalic.      Nose: Nose normal.      Mouth/Throat:      Mouth: Mucous membranes are moist.   Eyes:      Extraocular Movements: Extraocular movements intact.       Pupils: Pupils are equal, round, and reactive to light.   Cardiovascular:      Rate and Rhythm: Normal rate and regular rhythm.      Pulses: Normal pulses.   Pulmonary:      Effort: Pulmonary effort is normal.   Abdominal:      General: Abdomen is flat. There is no distension.      Palpations: Abdomen is soft. There is no mass.      Hernia: No hernia is present.      Comments: Incisions are c/d/i with appropriate TTP   Musculoskeletal:         General: Normal range of motion.   Skin:     General: Skin is warm.   Neurological:      General: No focal deficit present.      Mental Status: He is alert and oriented to person, place, and time.   Psychiatric:         Mood and Affect: Mood normal.         Behavior: Behavior normal.       Significant Labs:  All pertinent lab results within the last 24 hours have been reviewed.     Significant Diagnostics:  I have reviewed all pertinent imaging results/findings within the past 24 hours.

## 2022-01-14 NOTE — PT/OT/SLP EVAL
Physical Therapy Evaluation    Patient Name:  Jim Brown   MRN:  [id number]    Recommendations:     Discharge Recommendations:  home   Discharge Equipment Recommendations: walker, rolling   Barriers to discharge: None    Assessment:     Jim Brown is a 51 y.o. male admitted with a medical diagnosis of Malignant neoplasm of sigmoid colon.  He presents with the following impairments/functional limitations:  weakness,impaired endurance,impaired self care skills,impaired functional mobilty,gait instability,impaired balance,pain Pt. cooperative and tolerated treatment fairly well despite abd. pain. Pt. progressing with mobility.    Rehab Prognosis: Good; patient would benefit from acute skilled PT services to address these deficits and reach maximum level of function.    Recent Surgery: Procedure(s):  ROBOTIC RESECTION, COLON, LOW ANTERIOR  MOBILIZATION, SPLENIC FLEXURE  WRAP-OMENTAL  SIGMOIDOSCOPY, FLEXIBLE 1 Day Post-Op    Plan:     During this hospitalization, patient to be seen 4 x/week to address the identified rehab impairments via gait training,therapeutic activities,therapeutic exercises and progress toward the following goals:    · Plan of Care Expires:  02/13/22    Subjective     Chief Complaint: abd. pain  Patient/Family Comments/goals: pt. Agreeable to PT  Pain/Comfort:  · Pain Rating 1: 8/10  · Location - Orientation 1: generalized  · Location 1: abdomen  · Pain Addressed 1: Pre-medicate for activity,Reposition,Distraction,Cessation of Activity,Nurse notified  · Pain Rating Post-Intervention 1: 8/10    Patients cultural, spiritual, Islam conflicts given the current situation: no    Living Environment:  Pt. Lives with spouse and 15 y.o. child in [facility] with no AMANDEEP  Prior to admission, patients level of function was indep.  Equipment used at home: none.  Upon discharge, patient will have assistance from spouse, when she is not at work.    Objective:     Communicated with nursing prior to session.   Patient found up in chair with peripheral IV  upon PT entry to room.    General Precautions: Standard, fall   Orthopedic Precautions:N/A   Braces: N/A  Respiratory Status: Room air    Exams:  · RLE ROM: WFL  · RLE Strength: WFL  · LLE ROM: WFL  · LLE Strength: WFL    Functional Mobility:  · Transfers:     · Sit to Stand:  contact guard assistance with rolling walker  · Bed to Chair: contact guard assistance with  rolling walker  using  Stand Pivot  · Gait: 80' with RW and CGA. Pt. amb. with decreased step length/rob and forward flexed posture. Pt. started with step-to gait pattern to step-through by end of gait trial.  · Balance: fair    Therapeutic Activities and Exercises:   Discussed therapy/DME needs, goals, and POC.    AM-PAC 6 CLICK MOBILITY  Total Score:18     Patient left up in chair with all lines intact and call button in reach.    GOALS:   Multidisciplinary Problems     Physical Therapy Goals        Problem: Physical Therapy Goal    Goal Priority Disciplines Outcome Goal Variances Interventions   Physical Therapy Goal     PT, PT/OT Ongoing, Progressing     Description: Goals to be met by: 2022     Patient will increase functional independence with mobility by performin. Supine to sit with Set-up [place]  2. Sit to supine with Set-up [place]  3. Sit to stand transfer with Supervision  4. Bed to chair transfer with Supervision  5. Gait  x 250 feet with Supervision using LRAD.   6. Lower extremity exercise program x15 reps per handout, with supervision                     History:     History reviewed. No pertinent past medical history.    Past Surgical History:   Procedure Laterality Date    COLONOSCOPY  8/10/15    COLONOSCOPY N/A 2021    Procedure: COLONOSCOPY;  Surgeon: GM Webber MD;  Location: [facility] ([address] [place] [address]);  Service: Endoscopy;  Laterality: N/A;  Covid test  Casey, yulisa emailed to [email or web address]    FLEXIBLE SIGMOIDOSCOPY   1/13/2022    Procedure: SIGMOIDOSCOPY, FLEXIBLE;  Surgeon: GM Webber MD;  [facility]: [facility];  Service: Colon and Rectal;;    MOBILIZATION OF SPLENIC FLEXURE  1/13/2022    Procedure: MOBILIZATION, SPLENIC FLEXURE;  Surgeon: GM Webber MD;  [facility];  Service: Colon and Rectal;;    ROBOT-ASSISTED LOW ANTERIOR RESECTION OF COLON  1/13/2022    Procedure: ROBOTIC RESECTION, COLON, LOW ANTERIOR;  Surgeon: GM Webber MD;  [facility];  Service: Colon and Rectal;;    TONSILLECTOMY         Time Tracking:     PT Received On: 01/14/22  PT Start Time: 1343     PT Stop Time: 1406  PT Total Time (min): 23 min     Billable Minutes: Evaluation 15 and Gait Training 8      01/14/2022

## 2022-01-14 NOTE — ANESTHESIA POSTPROCEDURE EVALUATION
Anesthesia Post Evaluation    Patient: Jim Brown    Procedure(s) Performed: Procedure(s):  ROBOTIC RESECTION, COLON, LOW ANTERIOR  MOBILIZATION, SPLENIC FLEXURE  WRAP-OMENTAL  SIGMOIDOSCOPY, FLEXIBLE    Final Anesthesia Type: general      Patient location during evaluation: PACU  Patient participation: Yes- Able to Participate  Level of consciousness: awake and alert  Post-procedure vital signs: reviewed and stable  Pain management: adequate  Airway patency: patent    PONV status at discharge: No PONV  Anesthetic complications: no      Cardiovascular status: blood pressure returned to baseline  Respiratory status: unassisted  Hydration status: euvolemic  Follow-up not needed.          Vitals Value Taken Time   /78 01/13/22 2006   Temp 37.1 °C (98.8 °F) 01/13/22 2006   Pulse 88 01/13/22 2006   Resp 16 01/13/22 2201   SpO2 96 % 01/13/22 2006         Event Time   Out of Recovery 14:15:00         Pain/Traci Score: Traci Score: 9 (1/13/2022  2:15 PM)

## 2022-01-14 NOTE — PLAN OF CARE
Plan of care reviewed with patient, who verbalized understanding. Pain management has been difficult, with Pt requiring an additional dose of IV Dilaudid and oral narcotic pain medications administered ar maximum allowed frequencies. Pt is able to turn and position themselves, but has not gotten out of bed. Pt is tolerating their clear liquid diet without nausea, and has had a bowel movement 1/13. Pt is using their incentive spirometer with encouragement. Additional use of the incentive spirometer and ambulation in the halls should be encouraged this morning after a restful sleep.       Problem: Adult Inpatient Plan of Care  Goal: Plan of Care Review  Outcome: Ongoing, Progressing  Goal: Patient-Specific Goal (Individualized)  Outcome: Ongoing, Progressing  Goal: Absence of Hospital-Acquired Illness or Injury  Outcome: Ongoing, Progressing  Goal: Optimal Comfort and Wellbeing  Outcome: Ongoing, Progressing  Goal: Readiness for Transition of Care  Outcome: Ongoing, Progressing     Problem: Infection  Goal: Absence of Infection Signs and Symptoms  Outcome: Ongoing, Progressing     Problem: Fall Injury Risk  Goal: Absence of Fall and Fall-Related Injury  Outcome: Ongoing, Progressing     Problem: Pain Acute  Goal: Acceptable Pain Control and Functional Ability  Outcome: Ongoing, Progressing

## 2022-01-14 NOTE — PLAN OF CARE
Problem: Physical Therapy Goal  Goal: Physical Therapy Goal  Description: Goals to be met by: 2022     Patient will increase functional independence with mobility by performin. Supine to sit with Set-up Buchanan  2. Sit to supine with Set-up Buchanan  3. Sit to stand transfer with Supervision  4. Bed to chair transfer with Supervision  5. Gait  x 250 feet with Supervision using LRAD.   6. Lower extremity exercise program x15 reps per handout, with supervision    Outcome: Ongoing, Progressing

## 2022-01-14 NOTE — PLAN OF CARE
Pt in bed resting, C/O of pain. Awaiting tele box, telemetry notified. Tech stated they will send box shortly. Leads at bed sided. Normal saline running @ 40 ML/HR to R hand. Pain medication administered see mar. Bed in lowest position,wheels are locked and cassandra light is within reach. Will continue to monitor for any acute changes.            Problem: Adult Inpatient Plan of Care  Goal: Plan of Care Review  Outcome: Ongoing, Progressing  Goal: Patient-Specific Goal (Individualized)  Outcome: Ongoing, Progressing  Goal: Absence of Hospital-Acquired Illness or Injury  Outcome: Ongoing, Progressing  Goal: Optimal Comfort and Wellbeing  Outcome: Ongoing, Progressing  Goal: Readiness for Transition of Care  Outcome: Ongoing, Progressing     Problem: Infection  Goal: Absence of Infection Signs and Symptoms  Outcome: Ongoing, Progressing

## 2022-01-14 NOTE — ASSESSMENT & PLAN NOTE
51 y.o. male with rectosigmoid cancer s/p robotic LAR 1/13/21    Advance to regular diet   MM pain control. Add Toradol, Robaxin   D/c IVF  D/c jordan   PT to assist with ambulating today   Start DVT ppx this PM

## 2022-01-14 NOTE — PROGRESS NOTES
[facility]  Colorectal Surgery  Progress Note    Patient Name: Jim Brown  MRN: [id number]  Admission Date: 1/13/2022  Hospital Length of Stay: 1 days  Attending Physician: GM Webber MD    Subjective:     Interval History: No acute events overnight  Pain was an issue but improving. In chair this AM  Tolerating CLD without N/V  Good UOP    Post-Op Info:  Procedure(s):  ROBOTIC RESECTION, COLON, LOW ANTERIOR  MOBILIZATION, SPLENIC FLEXURE  WRAP-OMENTAL  SIGMOIDOSCOPY, FLEXIBLE   1 Day Post-Op      Medications:  Continuous Infusions:  Scheduled Meds:   acetaminophen  1,000 mg Intravenous Q8H    Followed by    acetaminophen  1,000 mg Oral Q8H    alvimopan  12 mg Oral BID    enoxaparin  40 mg Subcutaneous Daily    gabapentin  300 mg Oral TID    ketorolac  15 mg Intravenous Q6H    methocarbamoL  500 mg Oral QID    mupirocin   Nasal BID    tamsulosin  0.4 mg Oral Daily     PRN Meds:   albuterol    ondansetron    oxyCODONE    oxyCODONE    prochlorperazine    sodium chloride 0.9%    sodium chloride 0.9%    traMADoL        Objective:     Vital Signs (Most Recent):  Temp: 98 °F (36.7 °C) (01/14/22 0412)  Pulse: 71 (01/14/22 0412)  Resp: 16 (01/14/22 0602)  BP: 130/78 (01/14/22 0412)  SpO2: (!) 94 % (01/14/22 0412) Vital Signs (24h Range):  Temp:  [97 °F (36.1 °C)-98.8 °F (37.1 °C)] 98 °F (36.7 °C)  Pulse:  [61-88] 71  Resp:  [11-18] 16  SpO2:  [92 %-100 %] 94 %  BP: ()/(53-78) 130/78     Intake/Output - Last 3 Shifts       01/12 0700  01/13 0659 01/13 0700  01/14 0659 01/14 0700  01/15 0659    P.O.  180     I.V. (mL/kg)  651.3 (5.2)     IV Piggyback  1946.5     Total Intake(mL/kg)  2777.8 (22.2)     Urine (mL/kg/hr)  2475 (0.8)     Emesis/NG output  0     Stool  0     Blood  50     Total Output  2525     Net  +252.8            Stool Occurrence  0 x           Physical Exam  Vitals and nursing note reviewed.   Constitutional:       General: He is not in acute distress.     Appearance:  Normal appearance.   HENT:      Head: Normocephalic.      Nose: Nose normal.      Mouth/Throat:      Mouth: Mucous membranes are moist.   Eyes:      Extraocular Movements: Extraocular movements intact.      Pupils: Pupils are equal, round, and reactive to light.   Cardiovascular:      Rate and Rhythm: Normal rate and regular rhythm.      Pulses: Normal pulses.   Pulmonary:      Effort: Pulmonary effort is normal.   Abdominal:      General: Abdomen is flat. There is no distension.      Palpations: Abdomen is soft. There is no mass.      Hernia: No hernia is present.      Comments: Incisions are c/d/i with appropriate TTP   Musculoskeletal:         General: Normal range of motion.   Skin:     General: Skin is warm.   Neurological:      General: No focal deficit present.      Mental Status: He is alert and oriented to person, place, and time.   Psychiatric:         Mood and Affect: Mood normal.         Behavior: Behavior normal.       Significant Labs:  All pertinent lab results within the last 24 hours have been reviewed.     Significant Diagnostics:  I have reviewed all pertinent imaging results/findings within the past 24 hours.    Assessment/Plan:     * Malignant neoplasm of sigmoid colon  51 y.o. male with rectosigmoid cancer s/p robotic LAR 1/13/21    Advance to regular diet   MM pain control. Add Toradol, Robaxin   D/c IVF  D/c jordan   PT to assist with ambulating today   Start DVT ppx this PM      Mio Deng MD  Colorectal Surgery  [facility]

## 2022-01-15 PROCEDURE — 25000003 PHARM REV CODE 250: Performed by: STUDENT IN AN ORGANIZED HEALTH CARE EDUCATION/TRAINING PROGRAM

## 2022-01-15 PROCEDURE — 63600175 PHARM REV CODE 636 W HCPCS: Performed by: STUDENT IN AN ORGANIZED HEALTH CARE EDUCATION/TRAINING PROGRAM

## 2022-01-15 PROCEDURE — 20600001 HC STEP DOWN PRIVATE ROOM

## 2022-01-15 PROCEDURE — 25000003 PHARM REV CODE 250: Performed by: NURSE PRACTITIONER

## 2022-01-15 RX ADMIN — GABAPENTIN 300 MG: 300 CAPSULE ORAL at 09:01

## 2022-01-15 RX ADMIN — ACETAMINOPHEN 1000 MG: 500 TABLET ORAL at 09:01

## 2022-01-15 RX ADMIN — MUPIROCIN: 20 OINTMENT TOPICAL at 09:01

## 2022-01-15 RX ADMIN — KETOROLAC TROMETHAMINE 30 MG: 30 INJECTION, SOLUTION INTRAMUSCULAR; INTRAVENOUS at 05:01

## 2022-01-15 RX ADMIN — HYDROMORPHONE HYDROCHLORIDE 0.5 MG: 1 INJECTION, SOLUTION INTRAMUSCULAR; INTRAVENOUS; SUBCUTANEOUS at 05:01

## 2022-01-15 RX ADMIN — OXYCODONE HYDROCHLORIDE 10 MG: 10 TABLET ORAL at 12:01

## 2022-01-15 RX ADMIN — ACETAMINOPHEN 1000 MG: 500 TABLET ORAL at 05:01

## 2022-01-15 RX ADMIN — ALVIMOPAN 12 MG: 12 CAPSULE ORAL at 09:01

## 2022-01-15 RX ADMIN — TAMSULOSIN HYDROCHLORIDE 0.4 MG: 0.4 CAPSULE ORAL at 09:01

## 2022-01-15 RX ADMIN — ENOXAPARIN SODIUM 40 MG: 100 INJECTION SUBCUTANEOUS at 05:01

## 2022-01-15 RX ADMIN — OXYCODONE 5 MG: 5 TABLET ORAL at 04:01

## 2022-01-15 RX ADMIN — OXYCODONE HYDROCHLORIDE 10 MG: 10 TABLET ORAL at 05:01

## 2022-01-15 RX ADMIN — ACETAMINOPHEN 1000 MG: 500 TABLET ORAL at 03:01

## 2022-01-15 RX ADMIN — LIDOCAINE 5% 1 PATCH: 700 PATCH TOPICAL at 03:01

## 2022-01-15 RX ADMIN — KETOROLAC TROMETHAMINE 30 MG: 30 INJECTION, SOLUTION INTRAMUSCULAR; INTRAVENOUS at 12:01

## 2022-01-15 RX ADMIN — METHOCARBAMOL 500 MG: 500 TABLET ORAL at 09:01

## 2022-01-15 RX ADMIN — HYDROMORPHONE HYDROCHLORIDE 0.5 MG: 1 INJECTION, SOLUTION INTRAMUSCULAR; INTRAVENOUS; SUBCUTANEOUS at 09:01

## 2022-01-15 RX ADMIN — HYDROMORPHONE HYDROCHLORIDE 0.5 MG: 1 INJECTION, SOLUTION INTRAMUSCULAR; INTRAVENOUS; SUBCUTANEOUS at 03:01

## 2022-01-15 RX ADMIN — TRAMADOL HYDROCHLORIDE 50 MG: 50 TABLET, COATED ORAL at 01:01

## 2022-01-15 RX ADMIN — METHOCARBAMOL 500 MG: 500 TABLET ORAL at 05:01

## 2022-01-15 RX ADMIN — METHOCARBAMOL 500 MG: 500 TABLET ORAL at 12:01

## 2022-01-15 RX ADMIN — HYDROMORPHONE HYDROCHLORIDE 0.5 MG: 1 INJECTION, SOLUTION INTRAMUSCULAR; INTRAVENOUS; SUBCUTANEOUS at 08:01

## 2022-01-15 RX ADMIN — GABAPENTIN 300 MG: 300 CAPSULE ORAL at 03:01

## 2022-01-15 RX ADMIN — OXYCODONE HYDROCHLORIDE 10 MG: 10 TABLET ORAL at 10:01

## 2022-01-15 NOTE — PROGRESS NOTES
[facility]  Colorectal Surgery  Progress Note    Patient Name: Jim Brown  MRN: [id number]  Admission Date: 1/13/2022  Hospital Length of Stay: 2 days  Attending Physician: GM Webber MD    Subjective:     Interval History: No acute events overnight  Pain controlled and improved with addition of Robaxin. In chair this AM. Tolerating PO without N/V. Suboptimal UOP over past 24h.    Post-Op Info:  Procedure(s):  ROBOTIC RESECTION, COLON, LOW ANTERIOR  MOBILIZATION, SPLENIC FLEXURE  WRAP-OMENTAL  SIGMOIDOSCOPY, FLEXIBLE   2 Days Post-Op      Medications:  Continuous Infusions:  Scheduled Meds:   acetaminophen  1,000 mg Oral Q8H    alvimopan  12 mg Oral BID    enoxaparin  40 mg Subcutaneous Daily    gabapentin  300 mg Oral TID    ketorolac  30 mg Intravenous Q6H    LIDOcaine  1 patch Transdermal Q24H    methocarbamoL  500 mg Oral QID    mupirocin   Nasal BID    tamsulosin  0.4 mg Oral Daily     PRN Meds:   albuterol    HYDROmorphone    ondansetron    oxyCODONE    oxyCODONE    prochlorperazine    sodium chloride 0.9%    sodium chloride 0.9%    traMADoL        Objective:     Vital Signs (Most Recent):  Temp: 98.1 °F (36.7 °C) (01/15/22 0723)  Pulse: 73 (01/15/22 0723)  Resp: 17 (01/15/22 0723)  BP: (!) 137/93 (01/15/22 0723)  SpO2: (!) 94 % (01/15/22 0723) Vital Signs (24h Range):  Temp:  [97.1 °F (36.2 °C)-98.6 °F (37 °C)] 98.1 °F (36.7 °C)  Pulse:  [67-77] 73  Resp:  [16-19] 17  SpO2:  [92 %-95 %] 94 %  BP: (126-137)/(74-93) 137/93     Intake/Output - Last 3 Shifts       01/13 0700  01/14 0659 01/14 0700  01/15 0659 01/15 0700  01/16 0659    P.O. 180 960     I.V. (mL/kg) 651.3 (5.2)      IV Piggyback 1946.5      Total Intake(mL/kg) 2777.8 (22.2) 960 (7.7)     Urine (mL/kg/hr) 2475 (0.8) 800 (0.3)     Emesis/NG output 0      Stool 0 0     Blood 50      Total Output 2525 800     Net +252.8 +160            Urine Occurrence  1 x     Stool Occurrence 0 x 0 x           Physical Exam  Vitals  and nursing note reviewed.   Constitutional:       General: He is not in acute distress.     Appearance: Normal appearance.   HENT:      Head: Normocephalic.      Nose: Nose normal.      Mouth/Throat:      Mouth: Mucous membranes are moist.   Eyes:      Extraocular Movements: Extraocular movements intact.      Pupils: Pupils are equal, round, and reactive to light.   Cardiovascular:      Rate and Rhythm: Normal rate and regular rhythm.   Pulmonary:      Effort: Pulmonary effort is normal.   Abdominal:      General: Abdomen is flat. There is no distension.      Palpations: Abdomen is soft. There is no mass.      Hernia: No hernia is present.      Comments: Incisions are c/d/i with appropriate TTP   Musculoskeletal:         General: Normal range of motion.   Skin:     General: Skin is warm.   Neurological:      General: No focal deficit present.      Mental Status: He is alert and oriented to person, place, and time.   Psychiatric:         Mood and Affect: Mood normal.         Behavior: Behavior normal.       Significant Labs:  All pertinent lab results within the last 24 hours have been reviewed.     Significant Diagnostics:  I have reviewed all pertinent imaging results/findings within the past 24 hours.    Assessment/Plan:     * Malignant neoplasm of sigmoid colon  51 y.o. male with rectosigmoid cancer s/p robotic LAR 1/13/21. Doing well from a surgical perspective. 800 mL UOP over last 24h 2/2 decreased PO.    Plan:  Diet: regular; encouraged fluid intake and added Boost Plus with all meals  Pain: multimodal  DVT PPx: Lovenox  PT/OT: WBAT, ambulating well          Kem Mata MD  Colorectal Surgery  [facility]

## 2022-01-15 NOTE — ASSESSMENT & PLAN NOTE
51 y.o. male with rectosigmoid cancer s/p robotic LAR 1/13/21. Doing well from a surgical perspective. Decreased UOP over last 24h 2/2 decreased PO.    Plan:  Diet: regular; encouraged fluid intake and added Boost Plus with all meals  Pain: multimodal  DVT PPx: Lovenox  PT/OT: WBAT, ambulating well   All other review of systems negative, except as noted in HPI

## 2022-01-15 NOTE — OP NOTE
Date of procedure:  January 13, 2021    Indications for procedure:  50 yo male with upper rectal mass, 5 cm broad based villous type tumor removed by piecemeal snare resection.  Final pathology revealed invasive cancer.  CEA and metastatic work up has been unremarkable.  The options for treatment have been discussed in detail and the patient elects to udergo definitive low anterior resection.  The details of procedure, funcional consequences and the expected recuperation and risks have been explained to the patient and his wife.      Preoperative diagnosis:   Upper rectal cancer    Postoperative diagnosis:  same    Name of procedure:  Robotic low anterior resection, splenic flexure mobilization,  transversus abdominis plane block, omental graft to pelvis, flexible sigmoidoscopy    Surgeon:   GM Webber MD    Assistant surgeon:  Mio Deng MD    Type of anesthesia:  GETA    EBL:  50  Cc's    Drains:  none    Specimen:   1.  Portion of rectum, sigmoid colon.    2.  Distal and proximal anastomotic donuts.      Findings:  1.  Flexible sigmoidoscopy revealed tattoo at 15 cm from the anal verge, just at the third rectal valve.  No residual tumor seen  2.  No evidence of distant spread  3.  Tumor specific mesorectal excision,   4.  Scar 1 cm from the distal margin  5.  ICG scintigraphy excellent perfusion of the descending colon conduit  6.  Air leak test negative    Technique in detail:  The patient was brought to the operating room positively identified placed on the operating table in supine position.  Sequential compression devices were on his lower extremities.  The patient had undergone an outpatient oral mechanical and oral antibiotic bowel preparation.  He received intravenous antibiotics.  He was placed on a multimodal pain pathway.  He underwent general endotracheal anesthesia without complication.  Comer catheter and orogastric tube were inserted.  The patient was positioned on a a hat and was placed in the  modified lithotomy position and padded Yellofin stirrups.  Both arms were padded and tucked at his side.    A critical time-out was performed.    Flexible sigmoidoscopy was performed.  There were no palpable masses.  Flexible sigmoidoscopy revealed evidence of attached to in the upper rectum at the level of the 3rd rectal bowel.  There was no visible evidence of a mucosal tumor.  The tattoo had been placed during prior colonoscopy and was injected or placed just distal to the polypectomy site.  There were no other lesions seen.  The prep was noted be excellent.  After withdrawing the colonoscope Betadine was instilled into the rectal vault using an aseptic syringe.  The patient's abdomen and perineum were prepared and draped in usual sterile fashion.    Pneumoperitoneum was achieved by placing a Veress needle at eckert's point in the left upper quadrant.  Saline drop test was negative.  Insufflation was performed using carbon oxide gas and pressures were monitored throughout the procedure.  A 5 mm port was then placed into the peritoneal cavity using a 0 degree 5 mm laparoscoped and Optiview technique.  Three robotic ports were then placed in a diagonal line starting and the right lower quadrant 2 fingerbreadths medial to the anterior superior iliac spine.  The ports were then placed sequentially through stab incisions in the anterior abdominal wall 8-9 cm apart with the 5 mm port in the left upper quadrant being converted over to a robotic 8 mm port.  Two surgical assistant ports were then placed in the right side of the abdomen including an 8 mm AirSeal port and a 5 mm port.      Transversus abdominis plane block was performed using Exparel, Marcaine 0.25% with epinephrine and 100 cc of saline for a total of 150 cc.  All port site wounds were infiltrated.  The patient was placed in Trendelenburg position and rotated to the right.  The patient was noted have somewhat decreased intraperitoneal domain due to  mesenteric obesity.  The small bowel was swept as best as possible to the right side of the abdomen.      He has The XI robot was brought onto the field and docked utilizing a 2 left handed technique.  The omentum and transverse colon at and placed into the upper abdomen previously prior to docking the robot.  However, I was unable to clearly visualized the central retroperitoneum to pursue a vein 1st technique.  We proceeded with lateral to medial mobilization of the sigmoid and left colon along the white line of Toldt.  The bowel mesentery were carefully reflected back to the midline aorta with identification and preservation of the left ureter and gonadal vessel.  Dissection was carried to the level of the inferior border of the pancreas superiorly and to the ligament of Treitz medially.  The lesser sac was entered dissecting the omentum off of the lateral aspect of the splenic flexure and the splenic flexure was mobilized to the level of the middle colic vessels.    I then dissected the rectosigmoid off of the sacral promontory again with identification and preservation of the left ureter.  The mesorectum was mobilized laterally on the left side by scoring the retroperitoneum and dissecting in the avascular plane with care being taken to preserve the left superior hypogastric nerve as it coursed laterally.  I scored the retroperitoneum anteriorly between the bladder and the rectum to assist in mobilization of the mesorectum anteriorly.  The tattoo could be clearly visualized.  We endeavor to resect the bowel 2 cm distal to the visible tattoo.  The right side pelvis was then scored and the mesorectum was mobilized posteriorly in the avascular plane down to the lower 3rd of the rectum.  Lastly the right side of the mesorectum and anteriorly the mesorectum was mobilized such that a tumor specific excision could be undertaken.    After mobilizing the mesorectum was able to clearly identify the superior hemorrhoidal  vessel at the level of the sacral promontory and I scored the right side of the retroperitoneum at the base of the sigmoid and left colonic mesentery dissecting the superior hemorrhoidal vessel back to its takeoff from the aorta or the root of the inferior mesenteric artery.  The vessel was carefully skeletonized and clipped proximally and divided using the vessel sealer device.  Hemostasis was assured.  I then carefully mobilized the inferior mesenteric vein off the retroperitoneum dissecting just lateral to the 4th portion of the duodenum and mobilizing the vein to the level of the inferior border of the pancreas was carefully skeletonized clipped and divided.    The ascending branch of left colic artery and the inferior mesenteric vein were then carefully isolated and divided adjacent to the left colic artery.  I then divided the mesentery using the vessel sealer device along the left colic artery were just cranial to it out to the level of the descending colon sigmoid colon junction where the marginal artery of Paolo was carefully identified and divided using the vessel sealer device.  At this point we performed ICG scintigraphy and excellent perfusion was confirmed of the descending colon.    I then proceeded to perform tumor specific mesorectal excision with division of the mesorectum perpendicular to the longitudinal access of the rectal muscular tube at the pre determined site 2 cm distal to the tattoo.  The mesorectum was divided using the vessel sealer device.    At this point, we proceeded with a Pfannenstiel incision ultimately measuring 10 cm. A GelPort was placed into the wound.  We 1st divided the rectum using the Contour 40 stapler with a green load.  I exteriorized the portion of rectum and sigmoid colon and we identified the descending colon sigmoid colon junction where the mesentery had been divided out to the descending colon.  We divided the bowel proximally using a pursestring clamp placed  proximally and a crushing clamp placed distally.  Nylon suture on a Simón needle was used to fashion the pursestring.  The specimen was handed off the operative field and opened on a back table.  The scar/ polypectomy site was noted to be in the specimen.  Intact mesorectum was noted having been divided as a tumor specific mesorectal excision.    I placed the CDH 29 anvil into the bowel proximally.  The descending colon reached to the level of the sacral promontory and just below it.  I was concerned however that there might be tension and inadequate length.  We proceeded with laparoscopy and placed a 5 mm port a lateral quadrant.  Using hand assist technique I mobilized the omentum completely off of the transverse colon.  This was suspending the splenic flexure and transverse colon and preventing the bowel its maximal mesenteric length.  Now the descending colon easily reached to the mid pelvis for a tension-free anastomosis.    The assistant surgeon went to the perineum and dilated the anus to 2 finger breaths.  Through the GelPort wound I could guide him as he advanced intestinal sizers to the top of the residual rectum and then subsequently the CDH 29 stapler.  The descending colon was brought into proximity and the instrument was mated, cinched down and fired in routine fashion with recovery of 2 complete anastomotic donuts.  A performed flexible sigmoidoscopy.  The mucosa of the rectum and the descending colon was noted to be pink and viable.  The anastomosis itself was noted to be intact and hemostatic and upon air insufflation it was noted to be airtight.  The bowel was deflated and the colonoscope was removed.    The omentum was then brought along the left colic gutter into the pelvis and placed over the anastomosis.  The small bowel was placed anterior to this.  All ports were removed.  The Pfannenstiel incision was closed in layers with 0 Vicryl suture used to approximate the peritoneum and the posterior  she.  The anterior rectus sheath was closed using heavy PDS suture.  Karlie's fascia was approximated using 3-0 Vicryl suture and the skin was closed using a continuous subcuticular Monocryl suture.  All port site wounds were closed with subcuticular Monocryl and all wounds were then sealed with Dermabond.    The patient was returned to the supine position, awakened from anesthesia and extubated without incident.  The patient was returned to the recovery area in stable condition.    I was scrubbed and present for the entire operation.    GM Webber MD

## 2022-01-15 NOTE — PLAN OF CARE
Plan of care reviewed with patient, who verbalized understanding. Pt describes pain as tolerably controlled with regular use of both IV and oral narcotic pain medications administered at maximum allowed intervals. Pt is able to turn and position themselves, and has gotten out of bed, sat up in the chair and ambulated in the hallways. Pt is tolerating their regular diet without nausea, but has not had a bowel movement since 1/13. Pt is using their incentive spirometer with encouragement. Additional use of the incentive spirometer and ambulation in the halls should be encouraged this morning after a restful sleep.       Problem: Adult Inpatient Plan of Care  Goal: Plan of Care Review  Outcome: Ongoing, Progressing  Goal: Patient-Specific Goal (Individualized)  Outcome: Ongoing, Progressing  Goal: Absence of Hospital-Acquired Illness or Injury  Outcome: Ongoing, Progressing  Goal: Optimal Comfort and Wellbeing  Outcome: Ongoing, Progressing  Goal: Readiness for Transition of Care  Outcome: Ongoing, Progressing     Problem: Infection  Goal: Absence of Infection Signs and Symptoms  Outcome: Ongoing, Progressing     Problem: Fall Injury Risk  Goal: Absence of Fall and Fall-Related Injury  Outcome: Ongoing, Progressing     Problem: Pain Acute  Goal: Acceptable Pain Control and Functional Ability  Outcome: Ongoing, Progressing

## 2022-01-15 NOTE — PLAN OF CARE
Pt in chair resting, wife is at bedside. Pt denies pain and does not appear to be in any distress. VVS. Bed is in lowest position, wheels are locked and call light is within reach. Will continue to monitor for any acute changes.     Hydromorphone given 1 x during shift for break through pain (see mar). Pt appears to be tolerating pain since last does.          Problem: Adult Inpatient Plan of Care  Goal: Plan of Care Review  Outcome: Ongoing, Progressing  Goal: Patient-Specific Goal (Individualized)  Outcome: Ongoing, Progressing  Goal: Absence of Hospital-Acquired Illness or Injury  Outcome: Ongoing, Progressing  Goal: Optimal Comfort and Wellbeing  Outcome: Ongoing, Progressing  Goal: Readiness for Transition of Care  Outcome: Ongoing, Progressing     Problem: Infection  Goal: Absence of Infection Signs and Symptoms  Outcome: Ongoing, Progressing     Problem: Fall Injury Risk  Goal: Absence of Fall and Fall-Related Injury  Outcome: Ongoing, Progressing     Problem: Pain Acute  Goal: Acceptable Pain Control and Functional Ability  Outcome: Ongoing, Progressing

## 2022-01-15 NOTE — SUBJECTIVE & OBJECTIVE
Subjective:     Interval History: No acute events overnight  Pain controlled and improved with addition of Robaxin. In chair this AM. Tolerating PO without N/V. Suboptimal UOP over past 24h.    Post-Op Info:  Procedure(s):  ROBOTIC RESECTION, COLON, LOW ANTERIOR  MOBILIZATION, SPLENIC FLEXURE  WRAP-OMENTAL  SIGMOIDOSCOPY, FLEXIBLE   2 Days Post-Op      Medications:  Continuous Infusions:  Scheduled Meds:   acetaminophen  1,000 mg Oral Q8H    alvimopan  12 mg Oral BID    enoxaparin  40 mg Subcutaneous Daily    gabapentin  300 mg Oral TID    ketorolac  30 mg Intravenous Q6H    LIDOcaine  1 patch Transdermal Q24H    methocarbamoL  500 mg Oral QID    mupirocin   Nasal BID    tamsulosin  0.4 mg Oral Daily     PRN Meds:   albuterol    HYDROmorphone    ondansetron    oxyCODONE    oxyCODONE    prochlorperazine    sodium chloride 0.9%    sodium chloride 0.9%    traMADoL        Objective:     Vital Signs (Most Recent):  Temp: 98.1 °F (36.7 °C) (01/15/22 0723)  Pulse: 73 (01/15/22 0723)  Resp: 17 (01/15/22 0723)  BP: (!) 137/93 (01/15/22 0723)  SpO2: (!) 94 % (01/15/22 0723) Vital Signs (24h Range):  Temp:  [97.1 °F (36.2 °C)-98.6 °F (37 °C)] 98.1 °F (36.7 °C)  Pulse:  [67-77] 73  Resp:  [16-19] 17  SpO2:  [92 %-95 %] 94 %  BP: (126-137)/(74-93) 137/93     Intake/Output - Last 3 Shifts       01/13 0700  01/14 0659 01/14 0700  01/15 0659 01/15 0700 01/16 0659    P.O. 180 960     I.V. (mL/kg) 651.3 (5.2)      IV Piggyback 1946.5      Total Intake(mL/kg) 2777.8 (22.2) 960 (7.7)     Urine (mL/kg/hr) 2475 (0.8) 800 (0.3)     Emesis/NG output 0      Stool 0 0     Blood 50      Total Output 2525 800     Net +252.8 +160            Urine Occurrence  1 x     Stool Occurrence 0 x 0 x           Physical Exam  Vitals and nursing note reviewed.   Constitutional:       General: He is not in acute distress.     Appearance: Normal appearance.   HENT:      Head: Normocephalic.      Nose: Nose normal.      Mouth/Throat:       Mouth: Mucous membranes are moist.   Eyes:      Extraocular Movements: Extraocular movements intact.      Pupils: Pupils are equal, round, and reactive to light.   Cardiovascular:      Rate and Rhythm: Normal rate and regular rhythm.   Pulmonary:      Effort: Pulmonary effort is normal.   Abdominal:      General: Abdomen is flat. There is no distension.      Palpations: Abdomen is soft. There is no mass.      Hernia: No hernia is present.      Comments: Incisions are c/d/i with appropriate TTP   Musculoskeletal:         General: Normal range of motion.   Skin:     General: Skin is warm.   Neurological:      General: No focal deficit present.      Mental Status: He is alert and oriented to person, place, and time.   Psychiatric:         Mood and Affect: Mood normal.         Behavior: Behavior normal.       Significant Labs:  All pertinent lab results within the last 24 hours have been reviewed.     Significant Diagnostics:  I have reviewed all pertinent imaging results/findings within the past 24 hours.

## 2022-01-16 LAB — CRP SERPL-MCNC: 71 MG/L (ref 0–8.2)

## 2022-01-16 PROCEDURE — 25000003 PHARM REV CODE 250: Performed by: STUDENT IN AN ORGANIZED HEALTH CARE EDUCATION/TRAINING PROGRAM

## 2022-01-16 PROCEDURE — 25000003 PHARM REV CODE 250: Performed by: NURSE PRACTITIONER

## 2022-01-16 PROCEDURE — 86140 C-REACTIVE PROTEIN: CPT | Performed by: STUDENT IN AN ORGANIZED HEALTH CARE EDUCATION/TRAINING PROGRAM

## 2022-01-16 PROCEDURE — 63600175 PHARM REV CODE 636 W HCPCS: Performed by: STUDENT IN AN ORGANIZED HEALTH CARE EDUCATION/TRAINING PROGRAM

## 2022-01-16 PROCEDURE — 20600001 HC STEP DOWN PRIVATE ROOM

## 2022-01-16 PROCEDURE — 94761 N-INVAS EAR/PLS OXIMETRY MLT: CPT

## 2022-01-16 PROCEDURE — 99900035 HC TECH TIME PER 15 MIN (STAT)

## 2022-01-16 PROCEDURE — 36415 COLL VENOUS BLD VENIPUNCTURE: CPT | Performed by: STUDENT IN AN ORGANIZED HEALTH CARE EDUCATION/TRAINING PROGRAM

## 2022-01-16 RX ADMIN — METHOCARBAMOL 500 MG: 500 TABLET ORAL at 08:01

## 2022-01-16 RX ADMIN — METHOCARBAMOL 500 MG: 500 TABLET ORAL at 01:01

## 2022-01-16 RX ADMIN — ALVIMOPAN 12 MG: 12 CAPSULE ORAL at 08:01

## 2022-01-16 RX ADMIN — MUPIROCIN: 20 OINTMENT TOPICAL at 08:01

## 2022-01-16 RX ADMIN — TAMSULOSIN HYDROCHLORIDE 0.4 MG: 0.4 CAPSULE ORAL at 08:01

## 2022-01-16 RX ADMIN — KETOROLAC TROMETHAMINE 30 MG: 30 INJECTION, SOLUTION INTRAMUSCULAR; INTRAVENOUS at 12:01

## 2022-01-16 RX ADMIN — KETOROLAC TROMETHAMINE 30 MG: 30 INJECTION, SOLUTION INTRAMUSCULAR; INTRAVENOUS at 11:01

## 2022-01-16 RX ADMIN — GABAPENTIN 300 MG: 300 CAPSULE ORAL at 08:01

## 2022-01-16 RX ADMIN — OXYCODONE 5 MG: 5 TABLET ORAL at 03:01

## 2022-01-16 RX ADMIN — HYDROMORPHONE HYDROCHLORIDE 0.5 MG: 1 INJECTION, SOLUTION INTRAMUSCULAR; INTRAVENOUS; SUBCUTANEOUS at 04:01

## 2022-01-16 RX ADMIN — TRAMADOL HYDROCHLORIDE 50 MG: 50 TABLET, COATED ORAL at 12:01

## 2022-01-16 RX ADMIN — ACETAMINOPHEN 1000 MG: 500 TABLET ORAL at 05:01

## 2022-01-16 RX ADMIN — OXYCODONE HYDROCHLORIDE 10 MG: 10 TABLET ORAL at 05:01

## 2022-01-16 RX ADMIN — KETOROLAC TROMETHAMINE 30 MG: 30 INJECTION, SOLUTION INTRAMUSCULAR; INTRAVENOUS at 05:01

## 2022-01-16 RX ADMIN — KETOROLAC TROMETHAMINE 30 MG: 30 INJECTION, SOLUTION INTRAMUSCULAR; INTRAVENOUS at 06:01

## 2022-01-16 RX ADMIN — LIDOCAINE 5% 1 PATCH: 700 PATCH TOPICAL at 01:01

## 2022-01-16 RX ADMIN — OXYCODONE HYDROCHLORIDE 10 MG: 10 TABLET ORAL at 01:01

## 2022-01-16 RX ADMIN — ENOXAPARIN SODIUM 40 MG: 100 INJECTION SUBCUTANEOUS at 04:01

## 2022-01-16 RX ADMIN — HYDROMORPHONE HYDROCHLORIDE 0.5 MG: 1 INJECTION, SOLUTION INTRAMUSCULAR; INTRAVENOUS; SUBCUTANEOUS at 10:01

## 2022-01-16 RX ADMIN — ACETAMINOPHEN 1000 MG: 500 TABLET ORAL at 10:01

## 2022-01-16 RX ADMIN — GABAPENTIN 300 MG: 300 CAPSULE ORAL at 04:01

## 2022-01-16 RX ADMIN — HYDROMORPHONE HYDROCHLORIDE 0.5 MG: 1 INJECTION, SOLUTION INTRAMUSCULAR; INTRAVENOUS; SUBCUTANEOUS at 08:01

## 2022-01-16 RX ADMIN — OXYCODONE HYDROCHLORIDE 10 MG: 10 TABLET ORAL at 08:01

## 2022-01-16 RX ADMIN — OXYCODONE HYDROCHLORIDE 10 MG: 10 TABLET ORAL at 10:01

## 2022-01-16 RX ADMIN — METHOCARBAMOL 500 MG: 500 TABLET ORAL at 04:01

## 2022-01-16 NOTE — PLAN OF CARE
POC reviewed with pt.  - VSS on RA, AAOx4  - Lap sites and transverse incision dermabond CDI, bilateral feet with +2 edema currently elevated on pillow  - Pain controlled with PRN and scheduled pain meds  - no c/o nausea  - not passing flatus, stomach distended and slightly taunt. No BM this shift  - Adequate urine output light yellow urine near end of shift  - adequate PO intake and tolerating regular diet well.  - Patient ambulated in hallway x5 today independently and ambulated in room to bathroom independently  - SCDs in place  - call light in reach, [facility]

## 2022-01-16 NOTE — ASSESSMENT & PLAN NOTE
51 y.o. male with rectosigmoid cancer s/p robotic LAR 1/13/21. Doing well from a surgical perspective. Adequate UOP. Bloating improved from yesterday. Awaiting return of bowel function.    Plan:  Diet: regular + Boost with all meals; encouraged fluid intake  Pain: multimodal  DVT PPx: Lovenox  PT/OT: WBAT, ambulating well    Dispo: awaiting return of bowel function prior to discharge

## 2022-01-16 NOTE — PLAN OF CARE
Plan of care reviewed with patient, who verbalized understanding. Pt describes pain as well controlled with regular use of both IV and oral narcotic pain medications. Pt is able to turn and position themselves, and has gotten out of bed, sat up in the chair and ambulated in the hallways. Pt is tolerating their regular diet without nausea, but has not had a bowel movement since 1/13. Bowel sounds are now audible and active. Pt is using their incentive spirometer with encouragement. Additional use of the incentive spirometer and ambulation in the halls should be encouraged this morning after a restful sleep.       Problem: Adult Inpatient Plan of Care  Goal: Plan of Care Review  Outcome: Ongoing, Progressing  Goal: Patient-Specific Goal (Individualized)  Outcome: Ongoing, Progressing  Goal: Absence of Hospital-Acquired Illness or Injury  Outcome: Ongoing, Progressing  Goal: Optimal Comfort and Wellbeing  Outcome: Ongoing, Progressing  Goal: Readiness for Transition of Care  Outcome: Ongoing, Progressing     Problem: Infection  Goal: Absence of Infection Signs and Symptoms  Outcome: Ongoing, Progressing     Problem: Fall Injury Risk  Goal: Absence of Fall and Fall-Related Injury  Outcome: Ongoing, Progressing     Problem: Pain Acute  Goal: Acceptable Pain Control and Functional Ability  Outcome: Ongoing, Progressing

## 2022-01-16 NOTE — PROGRESS NOTES
[facility]  Colorectal Surgery  Progress Note    Patient Name: Jim Brown  MRN: [id number]  Admission Date: 1/13/2022  Hospital Length of Stay: 3 days  Attending Physician: GM Webber MD    Subjective:     Interval History: No acute events overnight  Pain well controlled. In chair this AM. Tolerating PO without N/V. Adequate UOP over past 24h. Patient endorses belching and stomach rumbling, but denies flatus or a bowel movement. Bloating improved.    Post-Op Info:  Procedure(s):  ROBOTIC RESECTION, COLON, LOW ANTERIOR  MOBILIZATION, SPLENIC FLEXURE  WRAP-OMENTAL  SIGMOIDOSCOPY, FLEXIBLE   3 Days Post-Op      Medications:  Continuous Infusions:  Scheduled Meds:   acetaminophen  1,000 mg Oral Q8H    alvimopan  12 mg Oral BID    enoxaparin  40 mg Subcutaneous Daily    gabapentin  300 mg Oral TID    ketorolac  30 mg Intravenous Q6H    LIDOcaine  1 patch Transdermal Q24H    methocarbamoL  500 mg Oral QID    mupirocin   Nasal BID    tamsulosin  0.4 mg Oral Daily     PRN Meds:   albuterol    HYDROmorphone    ondansetron    oxyCODONE    oxyCODONE    prochlorperazine    sodium chloride 0.9%    sodium chloride 0.9%    traMADoL        Objective:     Vital Signs (Most Recent):  Temp: 98.2 °F (36.8 °C) (01/16/22 0801)  Pulse: 73 (01/16/22 0801)  Resp: 18 (01/16/22 0801)  BP: (!) 143/87 (01/16/22 0801)  SpO2: (!) 94 % (01/16/22 0801) Vital Signs (24h Range):  Temp:  [97.5 °F (36.4 °C)-98.2 °F (36.8 °C)] 98.2 °F (36.8 °C)  Pulse:  [70-79] 73  Resp:  [16-19] 18  SpO2:  [94 %-97 %] 94 %  BP: (124-150)/(82-88) 143/87     Intake/Output - Last 3 Shifts       01/14 0700  01/15 0659 01/15 0700  01/16 0659 01/16 0700  01/17 0659    P.O. 960 490     I.V. (mL/kg)       IV Piggyback       Total Intake(mL/kg) 960 (7.7) 490 (3.9)     Urine (mL/kg/hr) 800 (0.3) 1375 (0.5)     Emesis/NG output       Stool 0 0     Blood       Total Output 800 1375     Net +160 -885            Urine Occurrence 1 x      Stool  Occurrence 0 x 0 x           Physical Exam  Vitals and nursing note reviewed.   Constitutional:       General: He is not in acute distress.     Appearance: Normal appearance.   HENT:      Head: Normocephalic.      Nose: Nose normal.      Mouth/Throat:      Mouth: Mucous membranes are moist.   Eyes:      Extraocular Movements: Extraocular movements intact.      Pupils: Pupils are equal, round, and reactive to light.   Cardiovascular:      Rate and Rhythm: Normal rate and regular rhythm.   Pulmonary:      Effort: Pulmonary effort is normal.   Abdominal:      General: Abdomen is flat. There is no distension.      Palpations: Abdomen is soft. There is no mass.      Hernia: No hernia is present.      Comments: Incisions are c/d/i with appropriate TTP   Musculoskeletal:         General: Normal range of motion.   Skin:     General: Skin is warm.   Neurological:      General: No focal deficit present.      Mental Status: He is alert and oriented to person, place, and time.   Psychiatric:         Mood and Affect: Mood normal.         Behavior: Behavior normal.       Significant Labs:  All pertinent lab results within the last 24 hours have been reviewed.     Significant Diagnostics:  I have reviewed all pertinent imaging results/findings within the past 24 hours.    Assessment/Plan:     * Malignant neoplasm of sigmoid colon  51 y.o. male with rectosigmoid cancer s/p robotic LAR 1/13/21. Doing well from a surgical perspective. Adequate UOP. Bloating improved from yesterday. Awaiting return of bowel function.    Plan:  Diet: regular + Boost with all meals; encouraged fluid intake  Pain: multimodal  DVT PPx: Lovenox  PT/OT: WBAT, ambulating well    Dispo: awaiting return of bowel function prior to discharge          Kem Mata MD  Colorectal Surgery  [facility]

## 2022-01-16 NOTE — SUBJECTIVE & OBJECTIVE
Subjective:     Interval History: No acute events overnight  Pain well controlled. In chair this AM. Tolerating PO without N/V. Adequate UOP over past 24h. Patient endorses belching and stomach rumbling, but denies flatus or a bowel movement. Bloating improved.    Post-Op Info:  Procedure(s):  ROBOTIC RESECTION, COLON, LOW ANTERIOR  MOBILIZATION, SPLENIC FLEXURE  WRAP-OMENTAL  SIGMOIDOSCOPY, FLEXIBLE   3 Days Post-Op      Medications:  Continuous Infusions:  Scheduled Meds:   acetaminophen  1,000 mg Oral Q8H    alvimopan  12 mg Oral BID    enoxaparin  40 mg Subcutaneous Daily    gabapentin  300 mg Oral TID    ketorolac  30 mg Intravenous Q6H    LIDOcaine  1 patch Transdermal Q24H    methocarbamoL  500 mg Oral QID    mupirocin   Nasal BID    tamsulosin  0.4 mg Oral Daily     PRN Meds:   albuterol    HYDROmorphone    ondansetron    oxyCODONE    oxyCODONE    prochlorperazine    sodium chloride 0.9%    sodium chloride 0.9%    traMADoL        Objective:     Vital Signs (Most Recent):  Temp: 98.2 °F (36.8 °C) (01/16/22 0801)  Pulse: 73 (01/16/22 0801)  Resp: 18 (01/16/22 0801)  BP: (!) 143/87 (01/16/22 0801)  SpO2: (!) 94 % (01/16/22 0801) Vital Signs (24h Range):  Temp:  [97.5 °F (36.4 °C)-98.2 °F (36.8 °C)] 98.2 °F (36.8 °C)  Pulse:  [70-79] 73  Resp:  [16-19] 18  SpO2:  [94 %-97 %] 94 %  BP: (124-150)/(82-88) 143/87     Intake/Output - Last 3 Shifts       01/14 0700  01/15 0659 01/15 0700  01/16 0659 01/16 0700 01/17 0659    P.O. 960 490     I.V. (mL/kg)       IV Piggyback       Total Intake(mL/kg) 960 (7.7) 490 (3.9)     Urine (mL/kg/hr) 800 (0.3) 1375 (0.5)     Emesis/NG output       Stool 0 0     Blood       Total Output 800 1375     Net +160 -885            Urine Occurrence 1 x      Stool Occurrence 0 x 0 x           Physical Exam  Vitals and nursing note reviewed.   Constitutional:       General: He is not in acute distress.     Appearance: Normal appearance.   HENT:      Head: Normocephalic.       Nose: Nose normal.      Mouth/Throat:      Mouth: Mucous membranes are moist.   Eyes:      Extraocular Movements: Extraocular movements intact.      Pupils: Pupils are equal, round, and reactive to light.   Cardiovascular:      Rate and Rhythm: Normal rate and regular rhythm.   Pulmonary:      Effort: Pulmonary effort is normal.   Abdominal:      General: Abdomen is flat. There is no distension.      Palpations: Abdomen is soft. There is no mass.      Hernia: No hernia is present.      Comments: Incisions are c/d/i with appropriate TTP   Musculoskeletal:         General: Normal range of motion.   Skin:     General: Skin is warm.   Neurological:      General: No focal deficit present.      Mental Status: He is alert and oriented to person, place, and time.   Psychiatric:         Mood and Affect: Mood normal.         Behavior: Behavior normal.       Significant Labs:  All pertinent lab results within the last 24 hours have been reviewed.     Significant Diagnostics:  I have reviewed all pertinent imaging results/findings within the past 24 hours.

## 2022-01-17 LAB — CRP SERPL-MCNC: 35.2 MG/L (ref 0–8.2)

## 2022-01-17 PROCEDURE — 36415 COLL VENOUS BLD VENIPUNCTURE: CPT | Performed by: STUDENT IN AN ORGANIZED HEALTH CARE EDUCATION/TRAINING PROGRAM

## 2022-01-17 PROCEDURE — 20600001 HC STEP DOWN PRIVATE ROOM

## 2022-01-17 PROCEDURE — 63600175 PHARM REV CODE 636 W HCPCS: Performed by: STUDENT IN AN ORGANIZED HEALTH CARE EDUCATION/TRAINING PROGRAM

## 2022-01-17 PROCEDURE — 25000003 PHARM REV CODE 250: Performed by: STUDENT IN AN ORGANIZED HEALTH CARE EDUCATION/TRAINING PROGRAM

## 2022-01-17 PROCEDURE — 86140 C-REACTIVE PROTEIN: CPT | Performed by: STUDENT IN AN ORGANIZED HEALTH CARE EDUCATION/TRAINING PROGRAM

## 2022-01-17 PROCEDURE — 25000003 PHARM REV CODE 250

## 2022-01-17 PROCEDURE — 25000003 PHARM REV CODE 250: Performed by: NURSE PRACTITIONER

## 2022-01-17 RX ORDER — OXYCODONE HYDROCHLORIDE 5 MG/1
5 TABLET ORAL EVERY 6 HOURS PRN
Status: DISCONTINUED | OUTPATIENT
Start: 2022-01-17 | End: 2022-01-17

## 2022-01-17 RX ORDER — OXYCODONE HYDROCHLORIDE 5 MG/1
5 TABLET ORAL EVERY 4 HOURS PRN
Status: DISCONTINUED | OUTPATIENT
Start: 2022-01-17 | End: 2022-01-18 | Stop reason: HOSPADM

## 2022-01-17 RX ADMIN — ALVIMOPAN 12 MG: 12 CAPSULE ORAL at 08:01

## 2022-01-17 RX ADMIN — ACETAMINOPHEN 1000 MG: 500 TABLET ORAL at 08:01

## 2022-01-17 RX ADMIN — HYDROMORPHONE HYDROCHLORIDE 0.5 MG: 1 INJECTION, SOLUTION INTRAMUSCULAR; INTRAVENOUS; SUBCUTANEOUS at 12:01

## 2022-01-17 RX ADMIN — OXYCODONE 5 MG: 5 TABLET ORAL at 03:01

## 2022-01-17 RX ADMIN — ACETAMINOPHEN 1000 MG: 500 TABLET ORAL at 02:01

## 2022-01-17 RX ADMIN — TAMSULOSIN HYDROCHLORIDE 0.4 MG: 0.4 CAPSULE ORAL at 08:01

## 2022-01-17 RX ADMIN — METHOCARBAMOL 500 MG: 500 TABLET ORAL at 04:01

## 2022-01-17 RX ADMIN — GABAPENTIN 300 MG: 300 CAPSULE ORAL at 08:01

## 2022-01-17 RX ADMIN — OXYCODONE HYDROCHLORIDE 10 MG: 10 TABLET ORAL at 04:01

## 2022-01-17 RX ADMIN — TRAMADOL HYDROCHLORIDE 50 MG: 50 TABLET, COATED ORAL at 12:01

## 2022-01-17 RX ADMIN — TRAMADOL HYDROCHLORIDE 50 MG: 50 TABLET, COATED ORAL at 06:01

## 2022-01-17 RX ADMIN — METHOCARBAMOL 500 MG: 500 TABLET ORAL at 12:01

## 2022-01-17 RX ADMIN — OXYCODONE 5 MG: 5 TABLET ORAL at 08:01

## 2022-01-17 RX ADMIN — ENOXAPARIN SODIUM 40 MG: 100 INJECTION SUBCUTANEOUS at 04:01

## 2022-01-17 RX ADMIN — MUPIROCIN: 20 OINTMENT TOPICAL at 08:01

## 2022-01-17 RX ADMIN — ACETAMINOPHEN 1000 MG: 500 TABLET ORAL at 05:01

## 2022-01-17 RX ADMIN — HYDROMORPHONE HYDROCHLORIDE 0.5 MG: 1 INJECTION, SOLUTION INTRAMUSCULAR; INTRAVENOUS; SUBCUTANEOUS at 05:01

## 2022-01-17 RX ADMIN — METHOCARBAMOL 500 MG: 500 TABLET ORAL at 08:01

## 2022-01-17 RX ADMIN — LIDOCAINE 5% 1 PATCH: 700 PATCH TOPICAL at 02:01

## 2022-01-17 RX ADMIN — KETOROLAC TROMETHAMINE 30 MG: 30 INJECTION, SOLUTION INTRAMUSCULAR; INTRAVENOUS at 12:01

## 2022-01-17 RX ADMIN — GABAPENTIN 300 MG: 300 CAPSULE ORAL at 02:01

## 2022-01-17 NOTE — PROGRESS NOTES
[facility]  Colorectal Surgery  Progress Note    Patient Name: Jim Brown  MRN: [id number]  Admission Date: 1/13/2022  Hospital Length of Stay: 4 days  Attending Physician: GM Webber MD    Subjective:     Interval History: Patient in chair on assessment this AM. Denies flatulence or BM. No N/V or abdominal pain with PO intake. Bloating improved from Saturday. VSS. Afebrile.     Post-Op Info:  Procedure(s):  ROBOTIC RESECTION, COLON, LOW ANTERIOR  MOBILIZATION, SPLENIC FLEXURE  WRAP-OMENTAL  SIGMOIDOSCOPY, FLEXIBLE   4 Days Post-Op      Medications:  Continuous Infusions:  Scheduled Meds:   acetaminophen  1,000 mg Oral Q8H    alvimopan  12 mg Oral BID    enoxaparin  40 mg Subcutaneous Daily    gabapentin  300 mg Oral TID    LIDOcaine  1 patch Transdermal Q24H    methocarbamoL  500 mg Oral QID    mupirocin   Nasal BID    tamsulosin  0.4 mg Oral Daily     PRN Meds:   albuterol    HYDROmorphone    ondansetron    oxyCODONE    oxyCODONE    prochlorperazine    sodium chloride 0.9%    sodium chloride 0.9%    traMADoL        Objective:     Vital Signs (Most Recent):  Temp: 97.5 °F (36.4 °C) (01/17/22 0406)  Pulse: 69 (01/17/22 0406)  Resp: 16 (01/17/22 0622)  BP: 117/73 (01/17/22 0406)  SpO2: 96 % (01/17/22 0406) Vital Signs (24h Range):  Temp:  [97.5 °F (36.4 °C)-99.7 °F (37.6 °C)] 97.5 °F (36.4 °C)  Pulse:  [] 69  Resp:  [12-20] 16  SpO2:  [91 %-96 %] 96 %  BP: (117-139)/(73-87) 117/73     Intake/Output - Last 3 Shifts       01/15 0700  01/16 0659 01/16 0700  01/17 0659 01/17 0700  01/18 0659    P.O. 490 3010     Total Intake(mL/kg) 490 (3.9) 3010 (24)     Urine (mL/kg/hr) 1375 (0.5) 3700 (1.2)     Stool 0 0     Total Output 1375 3700     Net -885 -690            Stool Occurrence 0 x 0 x           Physical Exam  Vitals and nursing note reviewed.   Constitutional:       General: He is not in acute distress.     Appearance: Normal appearance.   HENT:      Head: Normocephalic.      Nose:  Nose normal.   Eyes:      Extraocular Movements: Extraocular movements intact.   Cardiovascular:      Rate and Rhythm: Normal rate.   Pulmonary:      Effort: Pulmonary effort is normal. No respiratory distress.   Abdominal:      General: Abdomen is flat. There is no distension.      Palpations: Abdomen is soft.      Tenderness: There is no abdominal tenderness. There is no guarding or rebound.      Comments: Abdominal iIncision sites C/D/I   Skin:     General: Skin is warm.   Neurological:      General: No focal deficit present.      Mental Status: He is alert and oriented to person, place, and time.   Psychiatric:         Mood and Affect: Mood normal.         Behavior: Behavior normal.       Significant Labs:  None    Significant Diagnostics:  I have reviewed all pertinent imaging results/findings within the past 24 hours.    Assessment/Plan:     * Malignant neoplasm of sigmoid colon  Jim Brown is a 51 y.o. male with rectosigmoid cancer s/p robotic LAR 1/13/21.    Pain: MM + breakthrough  Diet: regular  Home meds: continue home flowmax, prn albuterol  DVT PPx: Lovenox  PT/OT: rolling walker    Dispo: awaiting return of bowel function prior to discharge          Robin Peter MD  Colorectal Surgery  [facility]

## 2022-01-17 NOTE — ASSESSMENT & PLAN NOTE
Jim Brown is a 51 y.o. male with rectosigmoid cancer s/p robotic LAR 1/13/21.    Pain: MM + breakthrough  Diet: regular + Boost with all meals; encouraged fluid intake  Home meds: continue home flowmax, prn albuterol  DVT PPx: Lovenox  PT/OT: rolling walker    Dispo: awaiting return of bowel function prior to discharge

## 2022-01-17 NOTE — SUBJECTIVE & OBJECTIVE
Subjective:     Interval History: Patient in chair on assessment this AM. Denies flatulence or BM. No N/V or abdominal pain with PO intake. Bloating improved from Saturday. VSS. Afebrile.     Post-Op Info:  Procedure(s):  ROBOTIC RESECTION, COLON, LOW ANTERIOR  MOBILIZATION, SPLENIC FLEXURE  WRAP-OMENTAL  SIGMOIDOSCOPY, FLEXIBLE   4 Days Post-Op      Medications:  Continuous Infusions:  Scheduled Meds:   acetaminophen  1,000 mg Oral Q8H    alvimopan  12 mg Oral BID    enoxaparin  40 mg Subcutaneous Daily    gabapentin  300 mg Oral TID    LIDOcaine  1 patch Transdermal Q24H    methocarbamoL  500 mg Oral QID    mupirocin   Nasal BID    tamsulosin  0.4 mg Oral Daily     PRN Meds:   albuterol    HYDROmorphone    ondansetron    oxyCODONE    oxyCODONE    prochlorperazine    sodium chloride 0.9%    sodium chloride 0.9%    traMADoL        Objective:     Vital Signs (Most Recent):  Temp: 97.5 °F (36.4 °C) (01/17/22 0406)  Pulse: 69 (01/17/22 0406)  Resp: 16 (01/17/22 0622)  BP: 117/73 (01/17/22 0406)  SpO2: 96 % (01/17/22 0406) Vital Signs (24h Range):  Temp:  [97.5 °F (36.4 °C)-99.7 °F (37.6 °C)] 97.5 °F (36.4 °C)  Pulse:  [] 69  Resp:  [12-20] 16  SpO2:  [91 %-96 %] 96 %  BP: (117-139)/(73-87) 117/73     Intake/Output - Last 3 Shifts       01/15 0700  01/16 0659 01/16 0700  01/17 0659 01/17 0700  01/18 0659    P.O. 490 3010     Total Intake(mL/kg) 490 (3.9) 3010 (24)     Urine (mL/kg/hr) 1375 (0.5) 3700 (1.2)     Stool 0 0     Total Output 1375 3700     Net -885 -690            Stool Occurrence 0 x 0 x           Physical Exam  Vitals and nursing note reviewed.   Constitutional:       General: He is not in acute distress.     Appearance: Normal appearance.   HENT:      Head: Normocephalic.      Nose: Nose normal.   Eyes:      Extraocular Movements: Extraocular movements intact.   Cardiovascular:      Rate and Rhythm: Normal rate.   Pulmonary:      Effort: Pulmonary effort is normal. No respiratory  distress.   Abdominal:      General: Abdomen is flat. There is no distension.      Palpations: Abdomen is soft.      Tenderness: There is no abdominal tenderness. There is no guarding or rebound.      Comments: Abdominal iIncision sites C/D/I   Skin:     General: Skin is warm.   Neurological:      General: No focal deficit present.      Mental Status: He is alert and oriented to person, place, and time.   Psychiatric:         Mood and Affect: Mood normal.         Behavior: Behavior normal.       Significant Labs:  None    Significant Diagnostics:  I have reviewed all pertinent imaging results/findings within the past 24 hours.

## 2022-01-17 NOTE — PLAN OF CARE
Plan of care reviewed with patient, who verbalized understanding. Pt describes pain as well controlled with regular use of narcotic pain medications. Pt was able to sleep for 4 hours without pain medication. Pt is able to turn and position themselves, and has gotten out of bed, sat up in the chair and ambulated in the hallways. Pt is tolerating their regular diet without nausea, but has not had a bowel movement since 1/13- before admission. Pt is using their incentive spirometer with encouragement. Additional use of the incentive spirometer and ambulation in the halls should be encouraged this morning after a restful sleep.       Problem: Adult Inpatient Plan of Care  Goal: Plan of Care Review  Outcome: Ongoing, Progressing  Goal: Patient-Specific Goal (Individualized)  Outcome: Ongoing, Progressing  Goal: Absence of Hospital-Acquired Illness or Injury  Outcome: Ongoing, Progressing  Goal: Optimal Comfort and Wellbeing  Outcome: Ongoing, Progressing  Goal: Readiness for Transition of Care  Outcome: Ongoing, Progressing     Problem: Infection  Goal: Absence of Infection Signs and Symptoms  Outcome: Ongoing, Progressing     Problem: Fall Injury Risk  Goal: Absence of Fall and Fall-Related Injury  Outcome: Ongoing, Progressing     Problem: Pain Acute  Goal: Acceptable Pain Control and Functional Ability  Outcome: Ongoing, Progressing

## 2022-01-18 VITALS
DIASTOLIC BLOOD PRESSURE: 87 MMHG | HEART RATE: 83 BPM | TEMPERATURE: 98 F | WEIGHT: 276 LBS | BODY MASS INDEX: 35.42 KG/M2 | HEIGHT: 74 IN | OXYGEN SATURATION: 95 % | SYSTOLIC BLOOD PRESSURE: 141 MMHG | RESPIRATION RATE: 18 BRPM

## 2022-01-18 LAB
ANION GAP SERPL CALC-SCNC: 8 MMOL/L (ref 8–16)
BASOPHILS # BLD AUTO: 0.03 K/UL (ref 0–0.2)
BASOPHILS NFR BLD: 0.5 % (ref 0–1.9)
BUN SERPL-MCNC: 9 MG/DL (ref 6–20)
CALCIUM SERPL-MCNC: 8.8 MG/DL (ref 8.7–10.5)
CHLORIDE SERPL-SCNC: 103 MMOL/L (ref 95–110)
CO2 SERPL-SCNC: 26 MMOL/L (ref 23–29)
CREAT SERPL-MCNC: 0.8 MG/DL (ref 0.5–1.4)
CRP SERPL-MCNC: 22.6 MG/L (ref 0–8.2)
DIFFERENTIAL METHOD: ABNORMAL
EOSINOPHIL # BLD AUTO: 0.4 K/UL (ref 0–0.5)
EOSINOPHIL NFR BLD: 7.6 % (ref 0–8)
ERYTHROCYTE [DISTWIDTH] IN BLOOD BY AUTOMATED COUNT: 11.7 % (ref 11.5–14.5)
EST. GFR  (AFRICAN AMERICAN): >60 ML/MIN/1.73 M^2
EST. GFR  (NON AFRICAN AMERICAN): >60 ML/MIN/1.73 M^2
GLUCOSE SERPL-MCNC: 93 MG/DL (ref 70–110)
HCT VFR BLD AUTO: 34.2 % (ref 40–54)
HGB BLD-MCNC: 11.5 G/DL (ref 14–18)
IMM GRANULOCYTES # BLD AUTO: 0.03 K/UL (ref 0–0.04)
IMM GRANULOCYTES NFR BLD AUTO: 0.5 % (ref 0–0.5)
LYMPHOCYTES # BLD AUTO: 1.4 K/UL (ref 1–4.8)
LYMPHOCYTES NFR BLD: 24.4 % (ref 18–48)
MAGNESIUM SERPL-MCNC: 2 MG/DL (ref 1.6–2.6)
MCH RBC QN AUTO: 32 PG (ref 27–31)
MCHC RBC AUTO-ENTMCNC: 33.6 G/DL (ref 32–36)
MCV RBC AUTO: 95 FL (ref 82–98)
MONOCYTES # BLD AUTO: 0.7 K/UL (ref 0.3–1)
MONOCYTES NFR BLD: 11.3 % (ref 4–15)
NEUTROPHILS # BLD AUTO: 3.2 K/UL (ref 1.8–7.7)
NEUTROPHILS NFR BLD: 55.7 % (ref 38–73)
NRBC BLD-RTO: 0 /100 WBC
PHOSPHATE SERPL-MCNC: 3 MG/DL (ref 2.7–4.5)
PLATELET # BLD AUTO: 150 K/UL (ref 150–450)
PMV BLD AUTO: 10 FL (ref 9.2–12.9)
POTASSIUM SERPL-SCNC: 4.3 MMOL/L (ref 3.5–5.1)
RBC # BLD AUTO: 3.59 M/UL (ref 4.6–6.2)
SODIUM SERPL-SCNC: 137 MMOL/L (ref 136–145)
WBC # BLD AUTO: 5.77 K/UL (ref 3.9–12.7)

## 2022-01-18 PROCEDURE — 83735 ASSAY OF MAGNESIUM: CPT | Performed by: STUDENT IN AN ORGANIZED HEALTH CARE EDUCATION/TRAINING PROGRAM

## 2022-01-18 PROCEDURE — 86140 C-REACTIVE PROTEIN: CPT | Performed by: STUDENT IN AN ORGANIZED HEALTH CARE EDUCATION/TRAINING PROGRAM

## 2022-01-18 PROCEDURE — 80048 BASIC METABOLIC PNL TOTAL CA: CPT | Performed by: STUDENT IN AN ORGANIZED HEALTH CARE EDUCATION/TRAINING PROGRAM

## 2022-01-18 PROCEDURE — 85025 COMPLETE CBC W/AUTO DIFF WBC: CPT | Performed by: STUDENT IN AN ORGANIZED HEALTH CARE EDUCATION/TRAINING PROGRAM

## 2022-01-18 PROCEDURE — 25000003 PHARM REV CODE 250

## 2022-01-18 PROCEDURE — 36415 COLL VENOUS BLD VENIPUNCTURE: CPT | Performed by: STUDENT IN AN ORGANIZED HEALTH CARE EDUCATION/TRAINING PROGRAM

## 2022-01-18 PROCEDURE — 25000003 PHARM REV CODE 250: Performed by: NURSE PRACTITIONER

## 2022-01-18 PROCEDURE — 25000003 PHARM REV CODE 250: Performed by: STUDENT IN AN ORGANIZED HEALTH CARE EDUCATION/TRAINING PROGRAM

## 2022-01-18 PROCEDURE — 84100 ASSAY OF PHOSPHORUS: CPT | Performed by: STUDENT IN AN ORGANIZED HEALTH CARE EDUCATION/TRAINING PROGRAM

## 2022-01-18 PROCEDURE — 97116 GAIT TRAINING THERAPY: CPT

## 2022-01-18 RX ORDER — OXYCODONE HYDROCHLORIDE 5 MG/1
5 TABLET ORAL EVERY 4 HOURS PRN
Qty: 30 TABLET | Refills: 0 | Status: SHIPPED | OUTPATIENT
Start: 2022-01-18 | End: 2022-01-24 | Stop reason: SDUPTHER

## 2022-01-18 RX ORDER — GABAPENTIN 300 MG/1
300 CAPSULE ORAL 3 TIMES DAILY
Qty: 42 CAPSULE | Refills: 0 | Status: SHIPPED | OUTPATIENT
Start: 2022-01-18 | End: 2022-02-01 | Stop reason: SDUPTHER

## 2022-01-18 RX ADMIN — MUPIROCIN: 20 OINTMENT TOPICAL at 08:01

## 2022-01-18 RX ADMIN — METHOCARBAMOL 500 MG: 500 TABLET ORAL at 08:01

## 2022-01-18 RX ADMIN — OXYCODONE 5 MG: 5 TABLET ORAL at 10:01

## 2022-01-18 RX ADMIN — TAMSULOSIN HYDROCHLORIDE 0.4 MG: 0.4 CAPSULE ORAL at 08:01

## 2022-01-18 RX ADMIN — METHOCARBAMOL 500 MG: 500 TABLET ORAL at 12:01

## 2022-01-18 RX ADMIN — ALVIMOPAN 12 MG: 12 CAPSULE ORAL at 08:01

## 2022-01-18 RX ADMIN — OXYCODONE 5 MG: 5 TABLET ORAL at 06:01

## 2022-01-18 RX ADMIN — ACETAMINOPHEN 1000 MG: 500 TABLET ORAL at 06:01

## 2022-01-18 RX ADMIN — GABAPENTIN 300 MG: 300 CAPSULE ORAL at 08:01

## 2022-01-18 RX ADMIN — OXYCODONE 5 MG: 5 TABLET ORAL at 02:01

## 2022-01-18 NOTE — DISCHARGE INSTRUCTIONS
No heavy lifting (>10lbs) for 6 weeks from day or surgery.   No pushing or pulling activities such as vacuuming or raking.   Activity as tolerated.   Patient can shower, allow water to run over incisions. No soaking in bath or pools for 2 weeks. Do not pick at surgical glue, it will come off on its own.   No driving while taking narcotics and until you can react quickyl without pain.   No straining, avoid constipation. May take OTC stool softeners as described and laxatives as needed.

## 2022-01-18 NOTE — PT/OT/SLP PROGRESS
Physical Therapy Treatment/Discharge    Patient Name:  Jim Brown   MRN:  [id number]    Recommendations:     Discharge Recommendations:  home   Discharge Equipment Recommendations: none   Barriers to discharge: None    Assessment:     Jim Brown is a 51 y.o. male admitted with a medical diagnosis of Malignant neoplasm of sigmoid colon.  He presents with the following impairments/functional limitations:   (pt. progressing towards functional baseline) Pt. cooperative and tolerated treatment well. Pt. progressing with mobility and has met goals for acute PT.    Rehab Prognosis: Good; patient would benefit from acute skilled PT services to address these deficits and reach maximum level of function.    Recent Surgery: Procedure(s):  ROBOTIC RESECTION, COLON, LOW ANTERIOR  MOBILIZATION, SPLENIC FLEXURE  WRAP-OMENTAL  SIGMOIDOSCOPY, FLEXIBLE 5 Days Post-Op    Plan:       (Discontinue acute PT)     · Plan of Care Expires:  02/13/22    Subjective     Chief Complaint: none  Patient/Family Comments/goals: to go home  Pain/Comfort:  · Pain Rating 1:  (pt. did not rate)      Objective:     Communicated with nursing prior to session.  Patient found supine with peripheral IV upon PT entry to room.     General Precautions: Standard, fall   Orthopedic Precautions:N/A   Braces: N/A  Respiratory Status: Room air     Functional Mobility:  · Bed Mobility:     · Rolling Left:  modified independence  · Scooting: modified independence  · Supine to Sit: modified independence  · Transfers:     · Sit to Stand:  modified independence with no AD  · Gait: >300' with Supervision without AD or LOB  · Balance: good      AM-PAC 6 CLICK MOBILITY  Turning over in bed (including adjusting bedclothes, sheets and blankets)?: 4  Sitting down on and standing up from a chair with arms (e.g., wheelchair, bedside commode, etc.): 4  Moving from lying on back to sitting on the side of the bed?: 4  Moving to and from a bed to a chair (including a  wheelchair)?: 4  Need to walk in hospital room?: 4  Climbing 3-5 steps with a railing?: 4  Basic Mobility Total Score: 24       Therapeutic Activities and Exercises:   Discussed pt.'s progress, goals, and POC    Patient left up in chair with all lines intact and call button in reach..    GOALS:   Multidisciplinary Problems     Physical Therapy Goals        Problem: Physical Therapy Goal    Goal Priority Disciplines Outcome Goal Variances Interventions   Physical Therapy Goal     PT, PT/OT Ongoing, Progressing     Description: Goals to be met by: 2022     Patient will increase functional independence with mobility by performin. Supine to sit with Set-up Bell  2. Sit to supine with Set-up Bell  3. Sit to stand transfer with Supervision  4. Bed to chair transfer with Supervision  5. Gait  x 250 feet with Supervision using LRAD.   6. Lower extremity exercise program x15 reps per handout, with supervision                     Time Tracking:     PT Received On: 22  PT Start Time: 1035     PT Stop Time: 1047  PT Total Time (min): 12 min     Billable Minutes: Gait Training 12    Treatment Type: Treatment  PT/PTA: PT           2022

## 2022-01-18 NOTE — DISCHARGE SUMMARY
[facility] - [facility]  Colorectal Surgery  Discharge Summary      Patient Name: Jim Brown  MRN: [id number]  Admission Date: 1/13/2022  Hospital Length of Stay: 5 days  Discharge Date and Time:  01/18/2022 7:18 AM  Attending Physician: GM Webber MD   Discharging Provider: Robin Peter MD  Primary Care Provider: No primary care provider on file.     HPI:  Jim Brown is a 51 y.o. male who presents for robotic LAR after colonoscopy performed on 11/23/21 demonstrated a large, greater than 50 mm, polyp of the sigmoid colon removed piecemeal with pathology demonstrating moderately differentiated adenocarcinoma. Staging CT was performed without evidence of metastatic disease.         Procedure(s):  ROBOTIC RESECTION, COLON, LOW ANTERIOR  MOBILIZATION, SPLENIC FLEXURE  WRAP-OMENTAL  SIGMOIDOSCOPY, FLEXIBLE     Hospital Course:  Please see the preoperative H&P and other available documentation for full details related to history leading up to this admission.  Briefly, Jim Brown is a 51 y.o. male who was admitted on 1/13/2022 following scheduled elective surgery for Colonic mass [K63.89].  Following a complete preoperative discussion of the risks and benefits of surgery with signed informed consent, the patient was taken to the operating room on 1/13/2022 and underwent the above stated procedures.  The patient tolerated surgery well and there were no complications.  Please see the operative report for full intraoperative findings and details.  Postoperatively, the patient did well and was transferred from the PACU to the 10th floor [facility] in stable condition where they overall had a stable and relatively uncomplicated hospital course.  Labs and vital signs remained essentially stable and appropriate throughout course, and repletion of electrolytes occurred as indicated.  Diet was advanced per usual surgical pathway at appropriate timing, and the patient was transitioned to oral pain medications without  problem.  Currently, the patient is doing well at 5 Days Post-Op, and is stable and appropriate for discharge home at this time.          Goals of Care Treatment Preferences:  Code Status: Full Code      Physical Exam:  Physical Exam  Vitals and nursing note reviewed.   Constitutional:       General: He is not in acute distress.     Appearance: Normal appearance. He is not ill-appearing, toxic-appearing or diaphoretic.   HENT:      Head: Normocephalic and atraumatic.      Mouth/Throat:      Mouth: Mucous membranes are moist.   Eyes:      Extraocular Movements: Extraocular movements intact.   Cardiovascular:      Rate and Rhythm: Normal rate.   Pulmonary:      Effort: Pulmonary effort is normal. No respiratory distress.   Abdominal:      General: There is no distension.      Palpations: Abdomen is soft.      Tenderness: There is abdominal tenderness (Mild stefania-incisional). There is no guarding or rebound.      Comments: Surgical sites C/D/I well approximated with dermabond.    Skin:     General: Skin is warm and dry.   Neurological:      General: No focal deficit present.      Mental Status: He is alert and oriented to person, place, and time.   Psychiatric:         Mood and Affect: Mood normal.         Behavior: Behavior normal.       Significant Diagnostic Studies:     Pending Diagnostic Studies:     Procedure Component Value Units Date/Time    Specimen to Pathology, Surgery Gastrointestinal tract [[id number]] Collected: 01/13/22 1259    Order Status: Sent Lab Status: In process Updated: 01/13/22 3573        Final Active Diagnoses:    Diagnosis Date Noted POA    PRINCIPAL PROBLEM:  Malignant neoplasm of sigmoid colon [C18.7] 12/15/2021 Yes      Problems Resolved During this Admission:      Discharged Condition: good    Disposition: Home or Self Care    Follow Up:   Follow-up Information     H Miller Webber MD In 2 weeks.    Specialty: Colon and Rectal Surgery  Why: For wound re-check  Contact information:  [address]  [facility] [address]  [phone]                       Patient Instructions:      Diet Adult Regular     Lifting restrictions   Order Comments: Do not lift greater than 10lbs for 6 weeks following surgery     Notify your health care provider if you experience any of the following:  temperature >100.4     Notify your health care provider if you experience any of the following:  persistent nausea and vomiting or diarrhea     Notify your health care provider if you experience any of the following:  severe uncontrolled pain     Notify your health care provider if you experience any of the following:  redness, tenderness, or signs of infection (pain, swelling, redness, odor or green/yellow discharge around incision site)     Notify your health care provider if you experience any of the following:  difficulty breathing or increased cough     Notify your health care provider if you experience any of the following:  severe persistent headache     Notify your health care provider if you experience any of the following:  worsening rash     Notify your health care provider if you experience any of the following:  persistent dizziness, light-headedness, or visual disturbances     Notify your health care provider if you experience any of the following:  increased confusion or weakness     No dressing needed     Activity as tolerated     Shower on day dressing removed (No bath)     Medications:  Reconciled Home Medications:      Medication List      START taking these medications    gabapentin 300 MG capsule  Commonly known as: NEURONTIN  Take 1 capsule (300 mg total) by mouth 3 (three) times daily. for 14 days     oxyCODONE 5 MG immediate release tablet  Commonly known as: ROXICODONE  Take 1 tablet (5 mg total) by mouth every 4 (four) hours as needed.        CONTINUE taking these medications    albuterol 90 mcg/actuation inhaler  Commonly known as: VENTOLIN HFA  Inhale 2 puffs into the lungs every 6 (six) hours  as needed for Wheezing. Rescue     ALPRAZolam 0.25 MG tablet  Commonly known as: XANAX  Take 1 tablet (0.25 mg total) by mouth 3 (three) times daily as needed (anxiety).     ascorbic acid (vitamin C) 500 MG tablet  Commonly known as: VITAMIN C  Take 1 tablet (500 mg total) by mouth 2 (two) times daily.     losartan 50 MG tablet  Commonly known as: COZAAR  Take 1 tablet (50 mg total) by mouth once daily.     pulse oximeter device  Commonly known as: pulse oximeter  by Apply Externally route 2 (two) times a day. Use twice daily at 8 AM and 3 PM and record the value in [employer] as directed.     sildenafiL 100 MG tablet  Commonly known as: VIAGRA  Take 1 tablet (100 mg total) by mouth daily as needed.     tamsulosin 0.4 mg Cap  Commonly known as: FLOMAX  Take 1 capsule (0.4 mg total) by mouth once daily.     THERA-TABS tablet  Generic drug: multivitamin  Take 1 tablet by mouth once daily.        STOP taking these medications    metroNIDAZOLE 500 MG tablet  Commonly known as: FLAGYL     neomycin 500 mg Tab  Commonly known as: MYCIFRADIN     polyethylene glycol 17 gram/dose powder  Commonly known as: GLYCOLAX            Robin Peter MD  Colorectal Surgery  [facility]

## 2022-01-18 NOTE — HOSPITAL COURSE
Please see the preoperative H&P and other available documentation for full details related to history leading up to this admission.  Briefly, Jim Brown is a 51 y.o. male who was admitted on 1/13/2022 following scheduled elective surgery for Colonic mass [K63.89].  Following a complete preoperative discussion of the risks and benefits of surgery with signed informed consent, the patient was taken to the operating room on 1/13/2022 and underwent the above stated procedures.  The patient tolerated surgery well and there were no complications.  Please see the operative report for full intraoperative findings and details.  Postoperatively, the patient did well and was transferred from the PACU to the 10th floor [facility] in stable condition where they overall had a stable and relatively uncomplicated hospital course.  Labs and vital signs remained essentially stable and appropriate throughout course, and repletion of electrolytes occurred as indicated.  Diet was advanced per usual surgical pathway at appropriate timing, and the patient was transitioned to oral pain medications without problem.  Currently, the patient is doing well at 5 Days Post-Op, and is stable and appropriate for discharge home at this time.

## 2022-01-18 NOTE — PLAN OF CARE
PLAN IS TO DISCHARGE HOME WITH NO NEEDS ALL FOLLOW UP APPTS ON AVS  [facility] - [facility]  Discharge Final Note    Primary Care Provider: No primary care provider on file.    Expected Discharge Date: 1/18/2022    Final Discharge Note (most recent)     Final Note - 01/18/22 1025        Final Note    Assessment Type Final Discharge Note     Anticipated Discharge Disposition Home or Self Care     Hospital Resources/Appts/Education Provided Appointments scheduled and added to AVS        Post-Acute Status    Discharge Delays None known at this time                 Important Message from Medicare             Contact Info     H Miller Webber MD   Specialty: Colon and Rectal Surgery    [address] [facility] [address]   Phone: [phone]       Next Steps: Follow up on 2/25/2022    Instructions: For wound re-check    Ambrose Correia MD    [facility]  [address] [facility] [id number] [phone]       Next Steps: Follow up on 1/25/2022    Instructions: F/U  1100 am

## 2022-01-18 NOTE — HPI
Jim Brown is a 51 y.o. male who presents for robotic LAR after colonoscopy performed on 11/23/21 demonstrated a large, greater than 50 mm, polyp of the sigmoid colon removed piecemeal with pathology demonstrating moderately differentiated adenocarcinoma. Staging CT was performed without evidence of metastatic disease.

## 2022-01-18 NOTE — NURSING
D/c instructions reviewed with pt. F/u appointment reviewed with pt.  Pt. Verbalized understanding. Medications at bedside with all belongings. Pt. Declined transportation. Pt walked out with wife.

## 2022-01-21 ENCOUNTER — PATIENT MESSAGE (OUTPATIENT)
Dept: SURGERY | Facility: CLINIC | Age: 52
End: 2022-01-21
Payer: COMMERCIAL

## 2022-01-21 LAB
COMMENT: NORMAL
FINAL PATHOLOGIC DIAGNOSIS: NORMAL
GROSS: NORMAL
Lab: NORMAL

## 2022-01-23 ENCOUNTER — PATIENT MESSAGE (OUTPATIENT)
Dept: SURGERY | Facility: CLINIC | Age: 52
End: 2022-01-23
Payer: COMMERCIAL

## 2022-01-24 RX ORDER — OXYCODONE HYDROCHLORIDE 5 MG/1
5 TABLET ORAL EVERY 4 HOURS PRN
Qty: 30 TABLET | Refills: 0 | Status: SHIPPED | OUTPATIENT
Start: 2022-01-24 | End: 2022-01-24 | Stop reason: SDUPTHER

## 2022-01-24 RX ORDER — OXYCODONE HYDROCHLORIDE 5 MG/1
5 TABLET ORAL EVERY 4 HOURS PRN
Qty: 30 TABLET | Refills: 0 | Status: SHIPPED | OUTPATIENT
Start: 2022-01-24 | End: 2022-01-31 | Stop reason: SDUPTHER

## 2022-01-25 ENCOUNTER — OFFICE VISIT (OUTPATIENT)
Dept: INTERNAL MEDICINE | Facility: CLINIC | Age: 52
End: 2022-01-25
Payer: COMMERCIAL

## 2022-01-25 VITALS
HEART RATE: 70 BPM | DIASTOLIC BLOOD PRESSURE: 82 MMHG | BODY MASS INDEX: 34.21 KG/M2 | OXYGEN SATURATION: 98 % | HEIGHT: 74 IN | SYSTOLIC BLOOD PRESSURE: 122 MMHG | WEIGHT: 266.56 LBS

## 2022-01-25 DIAGNOSIS — Z09 HOSPITAL DISCHARGE FOLLOW-UP: Primary | ICD-10-CM

## 2022-01-25 DIAGNOSIS — C20 RECTAL CANCER: ICD-10-CM

## 2022-01-25 DIAGNOSIS — Z98.890 S/P LAPAROSCOPIC SURGERY: ICD-10-CM

## 2022-01-25 PROCEDURE — 3074F PR MOST RECENT SYSTOLIC BLOOD PRESSURE < 130 MM HG: ICD-10-PCS | Mod: CPTII,S$GLB,, | Performed by: INTERNAL MEDICINE

## 2022-01-25 PROCEDURE — 99999 PR PBB SHADOW E&M-EST. PATIENT-LVL IV: ICD-10-PCS | Mod: PBBFAC,,, | Performed by: INTERNAL MEDICINE

## 2022-01-25 PROCEDURE — 3074F SYST BP LT 130 MM HG: CPT | Mod: CPTII,S$GLB,, | Performed by: INTERNAL MEDICINE

## 2022-01-25 PROCEDURE — 3079F PR MOST RECENT DIASTOLIC BLOOD PRESSURE 80-89 MM HG: ICD-10-PCS | Mod: CPTII,S$GLB,, | Performed by: INTERNAL MEDICINE

## 2022-01-25 PROCEDURE — 99999 PR PBB SHADOW E&M-EST. PATIENT-LVL IV: CPT | Mod: PBBFAC,,, | Performed by: INTERNAL MEDICINE

## 2022-01-25 PROCEDURE — 1111F PR DISCHARGE MEDS RECONCILED W/ CURRENT OUTPATIENT MED LIST: ICD-10-PCS | Mod: CPTII,S$GLB,, | Performed by: INTERNAL MEDICINE

## 2022-01-25 PROCEDURE — 3079F DIAST BP 80-89 MM HG: CPT | Mod: CPTII,S$GLB,, | Performed by: INTERNAL MEDICINE

## 2022-01-25 PROCEDURE — 99213 OFFICE O/P EST LOW 20 MIN: CPT | Mod: S$GLB,,, | Performed by: INTERNAL MEDICINE

## 2022-01-25 PROCEDURE — 3008F BODY MASS INDEX DOCD: CPT | Mod: CPTII,S$GLB,, | Performed by: INTERNAL MEDICINE

## 2022-01-25 PROCEDURE — 99213 PR OFFICE/OUTPT VISIT, EST, LEVL III, 20-29 MIN: ICD-10-PCS | Mod: S$GLB,,, | Performed by: INTERNAL MEDICINE

## 2022-01-25 PROCEDURE — 3008F PR BODY MASS INDEX (BMI) DOCUMENTED: ICD-10-PCS | Mod: CPTII,S$GLB,, | Performed by: INTERNAL MEDICINE

## 2022-01-25 PROCEDURE — 4010F PR ACE/ARB THEARPY RXD/TAKEN: ICD-10-PCS | Mod: CPTII,S$GLB,, | Performed by: INTERNAL MEDICINE

## 2022-01-25 PROCEDURE — 1111F DSCHRG MED/CURRENT MED MERGE: CPT | Mod: CPTII,S$GLB,, | Performed by: INTERNAL MEDICINE

## 2022-01-25 PROCEDURE — 4010F ACE/ARB THERAPY RXD/TAKEN: CPT | Mod: CPTII,S$GLB,, | Performed by: INTERNAL MEDICINE

## 2022-01-25 NOTE — PROGRESS NOTES
Subjective:       Patient ID: Jim Brown is a 51 y.o. male.    Chief Complaint: Hospital Follow Up      Jim Brown is a 51 y.o. year old male    HPI   51 y.o. here for hospital follow up s/p colon resection for tubulovillous adenoma. Clear margins, 0/16 nodes positive. Is to follow up with CRC. Reports he feels good after the surgery, no complaints.     Review of Systems   Constitutional: Negative for activity change, appetite change, chills, fatigue, fever and unexpected weight change.   HENT: Negative for congestion, rhinorrhea and sore throat.    Eyes: Negative for visual disturbance.   Respiratory: Negative for shortness of breath.    Cardiovascular: Negative for chest pain.   Gastrointestinal: Positive for constipation (mild). Negative for abdominal pain, diarrhea, nausea and vomiting.   Genitourinary: Negative for difficulty urinating and dysuria.   Musculoskeletal: Negative for arthralgias, back pain and myalgias.   Skin: Negative for color change and rash.   Neurological: Negative for dizziness, weakness and headaches.         No past medical history on file.     Prior to Admission medications    Medication Sig Start Date End Date Taking? Authorizing Provider   ALPRAZolam (XANAX) 0.25 MG tablet Take 1 tablet (0.25 mg total) by mouth 3 (three) times daily as needed (anxiety). 12/30/21 1/29/22 Yes Didi Claros NP   ascorbic acid, vitamin C, (VITAMIN C) 500 MG tablet Take 1 tablet (500 mg total) by mouth 2 (two) times daily. 12/11/20  Yes Roberta Tam MD   gabapentin (NEURONTIN) 300 MG capsule Take 1 capsule (300 mg total) by mouth 3 (three) times daily. for 14 days 1/18/22 2/1/22 Yes Robin Peter MD   losartan (COZAAR) 50 MG tablet Take 1 tablet (50 mg total) by mouth once daily. 2/1/21  Yes Cynthia Myers MD   multivitamin Tab Take 1 tablet by mouth once daily. 12/12/20  Yes Roberta Tam MD   oxyCODONE (ROXICODONE) 5 MG immediate release tablet Take 1 tablet (5 mg total) by mouth  "every 4 (four) hours as needed. 1/24/22  Yes Didi Claros NP   sildenafiL (VIAGRA) 100 MG tablet Take 1 tablet (100 mg total) by mouth daily as needed. 11/17/21 11/17/22 Yes Hieu Johnson Jr., MD   tamsulosin (FLOMAX) 0.4 mg Cap Take 1 capsule (0.4 mg total) by mouth once daily. 11/17/21  Yes Hieu Johnson Jr., MD   albuterol (VENTOLIN HFA) 90 mcg/actuation inhaler Inhale 2 puffs into the lungs every 6 (six) hours as needed for Wheezing. Rescue 12/6/20 1/25/22  Harshad Puckett NP   pulse oximeter (PULSE OXIMETER) device by Apply Externally route 2 (two) times a day. Use twice daily at 8 AM and 3 PM and record the value in MyChart as directed. 12/6/20 1/25/22  Harshad Puckett NP        Past medical history, surgical history, and family medical history reviewed and updated as appropriate.    Medications and allergies reviewed.     Objective:          Vitals:    01/25/22 1559   BP: 122/82   BP Location: Left arm   Patient Position: Sitting   BP Method: Large (Manual)   Pulse: 70   SpO2: 98%   Weight: 120.9 kg (266 lb 8.6 oz)   Height: 6' 2" (1.88 m)     Body mass index is 34.22 kg/m².  Physical Exam  Constitutional:       General: He is not in acute distress.     Appearance: He is well-developed.   HENT:      Head: Normocephalic and atraumatic.      Nose: Nose normal.   Eyes:      General: No scleral icterus.     Extraocular Movements: Extraocular movements intact.   Neck:      Thyroid: No thyromegaly.      Vascular: No JVD.      Trachea: No tracheal deviation.   Cardiovascular:      Rate and Rhythm: Normal rate and regular rhythm.      Heart sounds: Normal heart sounds. No murmur heard.    No friction rub. No gallop.   Pulmonary:      Effort: Pulmonary effort is normal. No respiratory distress.      Breath sounds: Normal breath sounds. No wheezing or rales.   Abdominal:      General: Bowel sounds are normal. There is no distension.      Palpations: Abdomen is soft. There is no mass.      Tenderness: " There is no abdominal tenderness.      Comments: Incisions and port sites appropriately healing. No signs of infection. Benign abdominal exam   Musculoskeletal:         General: No tenderness. Normal range of motion.      Cervical back: Normal range of motion and neck supple.   Lymphadenopathy:      Cervical: No cervical adenopathy.   Skin:     General: Skin is warm and dry.      Findings: No rash.   Neurological:      Mental Status: He is alert and oriented to person, place, and time.      Cranial Nerves: No cranial nerve deficit.      Deep Tendon Reflexes: Reflexes normal.   Psychiatric:         Behavior: Behavior normal.         Lab Results   Component Value Date    WBC 5.77 01/18/2022    HGB 11.5 (L) 01/18/2022    HCT 34.2 (L) 01/18/2022     01/18/2022    CHOL 220 (H) 10/29/2021    TRIG 156 (H) 10/29/2021    HDL 59 10/29/2021    ALT 21 10/29/2021    AST 20 10/29/2021     01/18/2022    K 4.3 01/18/2022     01/18/2022    CREATININE 0.8 01/18/2022    BUN 9 01/18/2022    CO2 26 01/18/2022    TSH 2.318 08/14/2018    PSA 2.5 10/29/2021    INR 1.0 12/08/2020    HGBA1C 5.2 12/07/2020       Assessment:       1. Hospital discharge follow-up    2. Rectal cancer    3. S/P laparoscopic surgery          Plan:     Jim was seen today for hospital follow up.    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     Health maintenance reviewed with patient.     Follow up in about 3 months (around 4/25/2022).    Ambrose Correia MD  Internal Medicine / Primary Care  [facility]  1/25/2022

## 2022-01-26 RX ORDER — LOSARTAN POTASSIUM 50 MG/1
TABLET ORAL
Qty: 90 TABLET | Refills: 0 | Status: SHIPPED | OUTPATIENT
Start: 2022-01-26 | End: 2022-08-17 | Stop reason: SDUPTHER

## 2022-01-29 ENCOUNTER — PATIENT MESSAGE (OUTPATIENT)
Dept: SURGERY | Facility: CLINIC | Age: 52
End: 2022-01-29
Payer: COMMERCIAL

## 2022-01-31 RX ORDER — GABAPENTIN 300 MG/1
300 CAPSULE ORAL 3 TIMES DAILY
Qty: 42 CAPSULE | Refills: 0 | OUTPATIENT
Start: 2022-01-31 | End: 2022-02-14

## 2022-01-31 RX ORDER — OXYCODONE HYDROCHLORIDE 5 MG/1
5 TABLET ORAL EVERY 4 HOURS PRN
Qty: 20 TABLET | Refills: 0 | Status: SHIPPED | OUTPATIENT
Start: 2022-01-31 | End: 2022-02-02 | Stop reason: SDUPTHER

## 2022-02-01 ENCOUNTER — PATIENT MESSAGE (OUTPATIENT)
Dept: SURGERY | Facility: CLINIC | Age: 52
End: 2022-02-01
Payer: COMMERCIAL

## 2022-02-01 RX ORDER — GABAPENTIN 300 MG/1
300 CAPSULE ORAL 3 TIMES DAILY
Qty: 42 CAPSULE | Refills: 0 | Status: SHIPPED | OUTPATIENT
Start: 2022-02-01 | End: 2022-02-17 | Stop reason: ALTCHOICE

## 2022-02-02 ENCOUNTER — OFFICE VISIT (OUTPATIENT)
Dept: SURGERY | Facility: CLINIC | Age: 52
End: 2022-02-02
Payer: COMMERCIAL

## 2022-02-02 VITALS
BODY MASS INDEX: 33.19 KG/M2 | WEIGHT: 258.63 LBS | SYSTOLIC BLOOD PRESSURE: 130 MMHG | DIASTOLIC BLOOD PRESSURE: 74 MMHG | HEIGHT: 74 IN | HEART RATE: 69 BPM

## 2022-02-02 DIAGNOSIS — Z48.89 ENCOUNTER FOR POST SURGICAL WOUND CHECK: Primary | ICD-10-CM

## 2022-02-02 PROCEDURE — 4010F PR ACE/ARB THEARPY RXD/TAKEN: ICD-10-PCS | Mod: CPTII,S$GLB,, | Performed by: COLON & RECTAL SURGERY

## 2022-02-02 PROCEDURE — 99024 POSTOP FOLLOW-UP VISIT: CPT | Mod: S$GLB,,, | Performed by: COLON & RECTAL SURGERY

## 2022-02-02 PROCEDURE — 3008F PR BODY MASS INDEX (BMI) DOCUMENTED: ICD-10-PCS | Mod: CPTII,S$GLB,, | Performed by: COLON & RECTAL SURGERY

## 2022-02-02 PROCEDURE — 99999 PR PBB SHADOW E&M-EST. PATIENT-LVL III: CPT | Mod: PBBFAC,,, | Performed by: COLON & RECTAL SURGERY

## 2022-02-02 PROCEDURE — 3078F DIAST BP <80 MM HG: CPT | Mod: CPTII,S$GLB,, | Performed by: COLON & RECTAL SURGERY

## 2022-02-02 PROCEDURE — 99024 PR POST-OP FOLLOW-UP VISIT: ICD-10-PCS | Mod: S$GLB,,, | Performed by: COLON & RECTAL SURGERY

## 2022-02-02 PROCEDURE — 1159F PR MEDICATION LIST DOCUMENTED IN MEDICAL RECORD: ICD-10-PCS | Mod: CPTII,S$GLB,, | Performed by: COLON & RECTAL SURGERY

## 2022-02-02 PROCEDURE — 3075F PR MOST RECENT SYSTOLIC BLOOD PRESS GE 130-139MM HG: ICD-10-PCS | Mod: CPTII,S$GLB,, | Performed by: COLON & RECTAL SURGERY

## 2022-02-02 PROCEDURE — 99999 PR PBB SHADOW E&M-EST. PATIENT-LVL III: ICD-10-PCS | Mod: PBBFAC,,, | Performed by: COLON & RECTAL SURGERY

## 2022-02-02 PROCEDURE — 4010F ACE/ARB THERAPY RXD/TAKEN: CPT | Mod: CPTII,S$GLB,, | Performed by: COLON & RECTAL SURGERY

## 2022-02-02 PROCEDURE — 3078F PR MOST RECENT DIASTOLIC BLOOD PRESSURE < 80 MM HG: ICD-10-PCS | Mod: CPTII,S$GLB,, | Performed by: COLON & RECTAL SURGERY

## 2022-02-02 PROCEDURE — 3075F SYST BP GE 130 - 139MM HG: CPT | Mod: CPTII,S$GLB,, | Performed by: COLON & RECTAL SURGERY

## 2022-02-02 PROCEDURE — 3008F BODY MASS INDEX DOCD: CPT | Mod: CPTII,S$GLB,, | Performed by: COLON & RECTAL SURGERY

## 2022-02-02 PROCEDURE — 1159F MED LIST DOCD IN RCRD: CPT | Mod: CPTII,S$GLB,, | Performed by: COLON & RECTAL SURGERY

## 2022-02-02 RX ORDER — OXYCODONE HYDROCHLORIDE 5 MG/1
5 TABLET ORAL EVERY 4 HOURS PRN
Qty: 20 TABLET | Refills: 0 | Status: SHIPPED | OUTPATIENT
Start: 2022-02-02 | End: 2022-02-08 | Stop reason: SDUPTHER

## 2022-02-02 NOTE — PROGRESS NOTES
HPI:  Jim Brown is a 51 y.o. male with history of upper rectal giant villous tumor     11-  resected piecemeal by colonoscopy  Findings:        The perianal and digital rectal examinations were normal.        A greater than 50 mm polyp was found in the sigmoid colon. The polyp        was semi-sessile. The polyp was removed with a piecemeal technique        using a hot snare. Resection and retrieval were complete.        Verification of patient identification for the specimen was done.        Estimated blood loss was minimal.        The terminal ileum appeared normal.    Pathology:    RECTUM, BIOPSY:  -Moderately differentiated adenocarcinoma arising within a tubulovillous adenoma with high-grade  dysplasia, see microscopic and comment.  COMMENT:  This case was also reviewed by Dr. Randi Allen, GI Path in our [place] division, who concurs with  the above diagnosis. A complete copy of her report is attached.  Tatyana Gardner M.D.  Report Attached  Performing site:  [facility]    1- robotic LAR tumor specific TME    Final Pathologic Diagnosis 1. SIGMOID COLON AND RECTUM, LOW ANTERIOR RESECTION:   - No evidence of residual adenoma or malignancy, pT0   - Tattoo site (entirely submitted for histopathologic evaluation)   - Sixteen lymph nodes, negative for metastatic disease (0/16), pN0   - Background colorectal mucosa exhibiting reactive epithelial changes and   diverticular disease associated with chronic inflammation   - See comments for synoptic report   2. DONOT, PROXIMAL, EXCISION:   - Segment of colonic tissue with no significant histopathologic abnormality   - No evidence of dysplasia or malignancy   3. DONUT, DISTAL, EXCISION:   - Segment of colorectal tissue with no significant histopathologic abnormality   - No evidence of dysplasia or malignancy    Comment: Interp By Jay Castillo M.D., Signed on 01/21/2022 at 17:16     Interval hx:   Feels well.  No problems  with wounds.  Mild constipation.  No pain.  Taking stool softeners.  No blood  Energy slowly returning.      No past medical history on file.     Past Surgical History:   Procedure Laterality Date    COLONOSCOPY  8/10/15    COLONOSCOPY N/A 11/23/2021    Procedure: COLONOSCOPY;  Surgeon: GM Webber MD;  Location: [facility] ENDO (4TH FLR);  Service: Endoscopy;  Laterality: N/A;  Covid test 11/23 [place], instructions emailed to [email or web address]    FLEXIBLE SIGMOIDOSCOPY  1/13/2022    Procedure: SIGMOIDOSCOPY, FLEXIBLE;  Surgeon: GM Webber MD;  Location: [facility] OR 2ND FLR;  Service: Colon and Rectal;;    MOBILIZATION OF SPLENIC FLEXURE  1/13/2022    Procedure: MOBILIZATION, SPLENIC FLEXURE;  Surgeon: GM Webber MD;  Location: [facility] OR 2ND FLR;  Service: Colon and Rectal;;    ROBOT-ASSISTED LOW ANTERIOR RESECTION OF COLON  1/13/2022    Procedure: ROBOTIC RESECTION, COLON, LOW ANTERIOR;  Surgeon: GM Webber MD;  Location: [facility] OR 2ND FLR;  Service: Colon and Rectal;;    TONSILLECTOMY         Review of patient's allergies indicates:   Allergen Reactions    Lisinopril Other (See Comments)       Family History   Problem Relation Age of Onset    Cancer Paternal Grandmother         colon cancer       Social History     Socioeconomic History    Marital status:    Tobacco Use    Smoking status: Never Smoker    Smokeless tobacco: Never Used   Substance and Sexual Activity    Alcohol use: Yes     Comment: socailly     Drug use: No       ROS:  GENERAL: No fever, chills, fatigability or weight loss.  Integument: No rashes, redness, icterus  CHEST: Denies MARIE, cyanosis, wheezing, cough and sputum production.  CARDIOVASCULAR: Denies chest pain, PND, orthopnea or reduced exercise tolerance.  GI: Denies abd pain, dysphagia, nausea, vomiting, no hematemesis   : Denies burning on urination, no hematuria, no bacteriuria  MSK: No deformities, swelling, joint pain swelling  Neurologic: No  "HAs, seizures, weakness, paresthesias, gait problems    PE:  General appearance well  /74 (BP Location: Left arm, Patient Position: Sitting, BP Method: Large (Automatic))   Pulse 69   Ht 6' 2" (1.88 m)   Wt 117.3 kg (258 lb 9.6 oz)   BMI 33.20 kg/m²     Sclera/ Skin anicteric  LN none palpable  AT NC EOMI  Neck supple trachea midline   Chest symmetric , nl excursion, no retractions, breathing comfortably  Abdomen wounds healed        ND soft NT.  no masses, no organomegaly  EXT - no CCE  Neuro:  Mood/ affect nl, alert and oriented x 3, moves all ext's, gait nl    Assessment:o  Wounds healed  Perioperative performance status improving.    Mild constipation    Plan:  High fiber diet - 25 grams per day.  Emphasis on whole grain breads such as double fiber wheat bread and high fiber cereals and bars.    Drink 8 glasses of water per day 64 - 96 oz per day  Fiber supplements such as KONSYL, METAMUCIL can be taken 1-2 x per day  Regular exercise - 30 min brisk walking 5 days per week  OV 12 weeks      "

## 2022-02-08 RX ORDER — OXYCODONE HYDROCHLORIDE 5 MG/1
5 TABLET ORAL EVERY 4 HOURS PRN
Qty: 20 TABLET | Refills: 0 | Status: SHIPPED | OUTPATIENT
Start: 2022-02-08 | End: 2022-02-11

## 2022-02-11 ENCOUNTER — PATIENT MESSAGE (OUTPATIENT)
Dept: SURGERY | Facility: CLINIC | Age: 52
End: 2022-02-11
Payer: COMMERCIAL

## 2022-02-11 RX ORDER — OXYCODONE HYDROCHLORIDE 5 MG/1
5 TABLET ORAL EVERY 6 HOURS PRN
Qty: 15 TABLET | Refills: 0 | Status: SHIPPED | OUTPATIENT
Start: 2022-02-11 | End: 2022-02-17 | Stop reason: SDUPTHER

## 2022-02-17 ENCOUNTER — PATIENT MESSAGE (OUTPATIENT)
Dept: SURGERY | Facility: CLINIC | Age: 52
End: 2022-02-17
Payer: COMMERCIAL

## 2022-02-17 RX ORDER — OXYCODONE HYDROCHLORIDE 5 MG/1
5 TABLET ORAL EVERY 6 HOURS PRN
Qty: 15 TABLET | Refills: 0 | OUTPATIENT
Start: 2022-02-17

## 2022-02-17 RX ORDER — OXYCODONE HYDROCHLORIDE 5 MG/1
5 TABLET ORAL EVERY 6 HOURS PRN
Qty: 15 TABLET | Refills: 0 | Status: SHIPPED | OUTPATIENT
Start: 2022-02-17 | End: 2022-03-03 | Stop reason: SDUPTHER

## 2022-02-17 RX ORDER — GABAPENTIN 300 MG/1
300 CAPSULE ORAL 3 TIMES DAILY
Qty: 42 CAPSULE | Refills: 0 | OUTPATIENT
Start: 2022-02-17 | End: 2022-03-03

## 2022-02-18 ENCOUNTER — TELEPHONE (OUTPATIENT)
Dept: SURGERY | Facility: CLINIC | Age: 52
End: 2022-02-18
Payer: COMMERCIAL

## 2022-02-18 DIAGNOSIS — C18.7 MALIGNANT NEOPLASM OF SIGMOID COLON: Primary | ICD-10-CM

## 2022-02-18 RX ORDER — GABAPENTIN 300 MG/1
300 CAPSULE ORAL 3 TIMES DAILY
Qty: 90 CAPSULE | Refills: 0 | Status: SHIPPED | OUTPATIENT
Start: 2022-02-18 | End: 2022-03-17

## 2022-02-27 ENCOUNTER — PATIENT MESSAGE (OUTPATIENT)
Dept: SURGERY | Facility: CLINIC | Age: 52
End: 2022-02-27
Payer: COMMERCIAL

## 2022-02-28 ENCOUNTER — PATIENT MESSAGE (OUTPATIENT)
Dept: SURGERY | Facility: CLINIC | Age: 52
End: 2022-02-28
Payer: COMMERCIAL

## 2022-03-02 ENCOUNTER — PATIENT MESSAGE (OUTPATIENT)
Dept: SURGERY | Facility: CLINIC | Age: 52
End: 2022-03-02
Payer: COMMERCIAL

## 2022-03-03 ENCOUNTER — PATIENT MESSAGE (OUTPATIENT)
Dept: SURGERY | Facility: CLINIC | Age: 52
End: 2022-03-03
Payer: COMMERCIAL

## 2022-03-03 ENCOUNTER — PATIENT MESSAGE (OUTPATIENT)
Dept: ADMINISTRATIVE | Facility: OTHER | Age: 52
End: 2022-03-03
Payer: COMMERCIAL

## 2022-03-03 RX ORDER — OXYCODONE HYDROCHLORIDE 5 MG/1
5 TABLET ORAL EVERY 6 HOURS PRN
Qty: 15 TABLET | Refills: 0 | Status: SHIPPED | OUTPATIENT
Start: 2022-03-03 | End: 2022-03-25 | Stop reason: SDUPTHER

## 2022-03-16 ENCOUNTER — PATIENT MESSAGE (OUTPATIENT)
Dept: RESEARCH | Facility: HOSPITAL | Age: 52
End: 2022-03-16
Payer: COMMERCIAL

## 2022-03-17 ENCOUNTER — TELEPHONE (OUTPATIENT)
Dept: SURGERY | Facility: CLINIC | Age: 52
End: 2022-03-17
Payer: COMMERCIAL

## 2022-03-17 NOTE — TELEPHONE ENCOUNTER
Tried contacting patient to reschedule his May 4th  appointment with Dr. Webber. A brief message and contact number was left prompting patient to return call.

## 2022-03-24 ENCOUNTER — PATIENT MESSAGE (OUTPATIENT)
Dept: SURGERY | Facility: CLINIC | Age: 52
End: 2022-03-24
Payer: COMMERCIAL

## 2022-03-25 NOTE — TELEPHONE ENCOUNTER
----- Message from Brittny Tang sent at 3/25/2022  1:22 PM CDT -----  Regarding: Missed call  Contact: [phone]  Calling to request refill  Rx Oxycodone. Please call to confirm

## 2022-03-28 RX ORDER — OXYCODONE HYDROCHLORIDE 5 MG/1
5 TABLET ORAL EVERY 6 HOURS PRN
Qty: 15 TABLET | Refills: 0 | Status: SHIPPED | OUTPATIENT
Start: 2022-03-28 | End: 2022-09-19

## 2022-05-03 ENCOUNTER — PATIENT MESSAGE (OUTPATIENT)
Dept: RESEARCH | Facility: HOSPITAL | Age: 52
End: 2022-05-03
Payer: COMMERCIAL

## 2022-05-11 ENCOUNTER — OFFICE VISIT (OUTPATIENT)
Dept: SURGERY | Facility: CLINIC | Age: 52
End: 2022-05-11
Payer: COMMERCIAL

## 2022-05-11 ENCOUNTER — LAB VISIT (OUTPATIENT)
Dept: LAB | Facility: HOSPITAL | Age: 52
End: 2022-05-11
Attending: COLON & RECTAL SURGERY
Payer: COMMERCIAL

## 2022-05-11 VITALS
SYSTOLIC BLOOD PRESSURE: 144 MMHG | HEIGHT: 74 IN | HEART RATE: 63 BPM | BODY MASS INDEX: 33.27 KG/M2 | WEIGHT: 259.25 LBS | DIASTOLIC BLOOD PRESSURE: 89 MMHG

## 2022-05-11 DIAGNOSIS — Z08 ENCOUNTER FOR FOLLOW-UP SURVEILLANCE OF COLON CANCER: Primary | ICD-10-CM

## 2022-05-11 DIAGNOSIS — Z08 ENCOUNTER FOR FOLLOW-UP SURVEILLANCE OF COLON CANCER: ICD-10-CM

## 2022-05-11 DIAGNOSIS — Z85.038 ENCOUNTER FOR FOLLOW-UP SURVEILLANCE OF COLON CANCER: Primary | ICD-10-CM

## 2022-05-11 DIAGNOSIS — Z85.038 ENCOUNTER FOR FOLLOW-UP SURVEILLANCE OF COLON CANCER: ICD-10-CM

## 2022-05-11 LAB — CEA SERPL-MCNC: <1.7 NG/ML (ref 0–5)

## 2022-05-11 PROCEDURE — 99999 PR PBB SHADOW E&M-EST. PATIENT-LVL III: ICD-10-PCS | Mod: PBBFAC,,, | Performed by: COLON & RECTAL SURGERY

## 2022-05-11 PROCEDURE — 3077F SYST BP >= 140 MM HG: CPT | Mod: CPTII,S$GLB,, | Performed by: COLON & RECTAL SURGERY

## 2022-05-11 PROCEDURE — 99214 OFFICE O/P EST MOD 30 MIN: CPT | Mod: S$GLB,,, | Performed by: COLON & RECTAL SURGERY

## 2022-05-11 PROCEDURE — 99214 PR OFFICE/OUTPT VISIT, EST, LEVL IV, 30-39 MIN: ICD-10-PCS | Mod: S$GLB,,, | Performed by: COLON & RECTAL SURGERY

## 2022-05-11 PROCEDURE — 3008F BODY MASS INDEX DOCD: CPT | Mod: CPTII,S$GLB,, | Performed by: COLON & RECTAL SURGERY

## 2022-05-11 PROCEDURE — 99999 PR PBB SHADOW E&M-EST. PATIENT-LVL III: CPT | Mod: PBBFAC,,, | Performed by: COLON & RECTAL SURGERY

## 2022-05-11 PROCEDURE — 36415 COLL VENOUS BLD VENIPUNCTURE: CPT | Performed by: COLON & RECTAL SURGERY

## 2022-05-11 PROCEDURE — 4010F ACE/ARB THERAPY RXD/TAKEN: CPT | Mod: CPTII,S$GLB,, | Performed by: COLON & RECTAL SURGERY

## 2022-05-11 PROCEDURE — 3079F DIAST BP 80-89 MM HG: CPT | Mod: CPTII,S$GLB,, | Performed by: COLON & RECTAL SURGERY

## 2022-05-11 PROCEDURE — 1159F MED LIST DOCD IN RCRD: CPT | Mod: CPTII,S$GLB,, | Performed by: COLON & RECTAL SURGERY

## 2022-05-11 PROCEDURE — 4010F PR ACE/ARB THEARPY RXD/TAKEN: ICD-10-PCS | Mod: CPTII,S$GLB,, | Performed by: COLON & RECTAL SURGERY

## 2022-05-11 PROCEDURE — 3079F PR MOST RECENT DIASTOLIC BLOOD PRESSURE 80-89 MM HG: ICD-10-PCS | Mod: CPTII,S$GLB,, | Performed by: COLON & RECTAL SURGERY

## 2022-05-11 PROCEDURE — 3008F PR BODY MASS INDEX (BMI) DOCUMENTED: ICD-10-PCS | Mod: CPTII,S$GLB,, | Performed by: COLON & RECTAL SURGERY

## 2022-05-11 PROCEDURE — 1159F PR MEDICATION LIST DOCUMENTED IN MEDICAL RECORD: ICD-10-PCS | Mod: CPTII,S$GLB,, | Performed by: COLON & RECTAL SURGERY

## 2022-05-11 PROCEDURE — 3077F PR MOST RECENT SYSTOLIC BLOOD PRESSURE >= 140 MM HG: ICD-10-PCS | Mod: CPTII,S$GLB,, | Performed by: COLON & RECTAL SURGERY

## 2022-05-11 PROCEDURE — 82378 CARCINOEMBRYONIC ANTIGEN: CPT | Performed by: COLON & RECTAL SURGERY

## 2022-05-11 NOTE — PROGRESS NOTES
HPI:  Jim Brown is a 51 y.o. male with history of upper rectal giant villous tumor      11-  resected piecemeal by colonoscopy  Findings:        The perianal and digital rectal examinations were normal.        A greater than 50 mm polyp was found in the sigmoid colon. The polyp        was semi-sessile. The polyp was removed with a piecemeal technique        using a hot snare. Resection and retrieval were complete.        Verification of patient identification for the specimen was done.        Estimated blood loss was minimal.        The terminal ileum appeared normal.     Pathology:    RECTUM, BIOPSY:  -Moderately differentiated adenocarcinoma arising within a tubulovillous adenoma with high-grade  dysplasia, see microscopic and comment.  COMMENT:  This case was also reviewed by Dr. Randi Allen, GI Path in our [place] division, who concurs with  the above diagnosis. A complete copy of her report is attached.  Tatyana Gardner M.D.  Report Attached  Performing site:  [facility]       05/11/2022 interval history  Feels well.  No pain.  Numb over hypogastrium.  BMs 2 per day, no blood  Appetite and energy levels good.  Voiding and sex function nl.      No past medical history on file.     Past Surgical History:   Procedure Laterality Date    COLONOSCOPY  8/10/15    COLONOSCOPY N/A 11/23/2021    Procedure: COLONOSCOPY;  Surgeon: GM Webber MD;  Location: [facility] (4TH FLR);  Service: Endoscopy;  Laterality: N/A;  Covid test 11/23 McClure, yulisa emailed to [email or web address]    FLEXIBLE SIGMOIDOSCOPY  1/13/2022    Procedure: SIGMOIDOSCOPY, FLEXIBLE;  Surgeon: GM Webber MD;  Location: [facility] OR 2ND FLR;  Service: Colon and Rectal;;    MOBILIZATION OF SPLENIC FLEXURE  1/13/2022    Procedure: MOBILIZATION, SPLENIC FLEXURE;  Surgeon: GM Webber MD;  Location: [facility] OR 2ND FLR;  Service: Colon and Rectal;;    ROBOT-ASSISTED LOW ANTERIOR RESECTION  OF COLON  1/13/2022    Procedure: ROBOTIC RESECTION, COLON, LOW ANTERIOR;  Surgeon: GM Webber MD;  Location: [facility] OR [address] [place] [address];  Service: Colon and Rectal;;    TONSILLECTOMY         Review of patient's allergies indicates:   Allergen Reactions    Lisinopril Other (See Comments)       Family History   Problem Relation Age of Onset    Cancer Paternal Grandmother         colon cancer       Social History     Socioeconomic History    Marital status:    Tobacco Use    Smoking status: Never Smoker    Smokeless tobacco: Never Used   Substance and Sexual Activity    Alcohol use: Yes     Comment: socailly     Drug use: No       ROS:  GENERAL: No fever, chills, fatigability or weight loss.  Integument: No rashes, redness, icterus  CHEST: Denies MARIE, cyanosis, wheezing, cough and sputum production.  CARDIOVASCULAR: Denies chest pain, PND, orthopnea or reduced exercise tolerance.  GI: Denies abd pain, dysphagia, nausea, vomiting, no hematemesis   : Denies burning on urination, no hematuria, no bacteriuria  MSK: No deformities, swelling, joint pain swelling  Neurologic: No HAs, seizures, weakness, paresthesias, gait problems    PE:  General appearance well  There were no vitals taken for this visit.  Sclera/ Skin anicteric  LN none palpable  AT NC EOMI  Neck supple trachea midline   Chest symmetric, nl excursion, no retractions, breathing comfortably  Abdomen  ND soft NT.  no masses, no organomegaly  EXT - no CCE  Neuro:  Mood/ affect nl, alert and oriented x 3, moves all ext's, gait nl      Assessment:  Hx of stage 1 colon CA, rectosigmoid  KATLYN  Good functional status      Plan:  RTC 3 months  CEA level

## 2022-05-23 DIAGNOSIS — C18.7 MALIGNANT NEOPLASM OF SIGMOID COLON: ICD-10-CM

## 2022-05-23 RX ORDER — GABAPENTIN 300 MG/1
CAPSULE ORAL
Qty: 90 CAPSULE | Refills: 0 | Status: SHIPPED | OUTPATIENT
Start: 2022-05-23 | End: 2022-06-24

## 2022-08-17 RX ORDER — LOSARTAN POTASSIUM 50 MG/1
50 TABLET ORAL DAILY
Qty: 90 TABLET | Refills: 0 | Status: SHIPPED | OUTPATIENT
Start: 2022-08-17 | End: 2022-09-19 | Stop reason: SDUPTHER

## 2022-08-24 ENCOUNTER — LAB VISIT (OUTPATIENT)
Dept: LAB | Facility: HOSPITAL | Age: 52
End: 2022-08-24
Attending: COLON & RECTAL SURGERY
Payer: COMMERCIAL

## 2022-08-24 ENCOUNTER — OFFICE VISIT (OUTPATIENT)
Dept: SURGERY | Facility: CLINIC | Age: 52
End: 2022-08-24
Payer: COMMERCIAL

## 2022-08-24 VITALS — HEIGHT: 74 IN | WEIGHT: 261.5 LBS | BODY MASS INDEX: 33.56 KG/M2

## 2022-08-24 DIAGNOSIS — Z91.89 COLON CANCER HIGH RISK: ICD-10-CM

## 2022-08-24 DIAGNOSIS — Z91.89 COLON CANCER HIGH RISK: Primary | ICD-10-CM

## 2022-08-24 LAB — CEA SERPL-MCNC: <1.7 NG/ML (ref 0–5)

## 2022-08-24 PROCEDURE — 1159F MED LIST DOCD IN RCRD: CPT | Mod: CPTII,S$GLB,, | Performed by: COLON & RECTAL SURGERY

## 2022-08-24 PROCEDURE — 99214 OFFICE O/P EST MOD 30 MIN: CPT | Mod: S$GLB,,, | Performed by: COLON & RECTAL SURGERY

## 2022-08-24 PROCEDURE — 99214 PR OFFICE/OUTPT VISIT, EST, LEVL IV, 30-39 MIN: ICD-10-PCS | Mod: S$GLB,,, | Performed by: COLON & RECTAL SURGERY

## 2022-08-24 PROCEDURE — 4010F ACE/ARB THERAPY RXD/TAKEN: CPT | Mod: CPTII,S$GLB,, | Performed by: COLON & RECTAL SURGERY

## 2022-08-24 PROCEDURE — 99999 PR PBB SHADOW E&M-EST. PATIENT-LVL III: ICD-10-PCS | Mod: PBBFAC,,, | Performed by: COLON & RECTAL SURGERY

## 2022-08-24 PROCEDURE — 99999 PR PBB SHADOW E&M-EST. PATIENT-LVL III: CPT | Mod: PBBFAC,,, | Performed by: COLON & RECTAL SURGERY

## 2022-08-24 PROCEDURE — 36415 COLL VENOUS BLD VENIPUNCTURE: CPT | Performed by: STUDENT IN AN ORGANIZED HEALTH CARE EDUCATION/TRAINING PROGRAM

## 2022-08-24 PROCEDURE — 82378 CARCINOEMBRYONIC ANTIGEN: CPT | Performed by: STUDENT IN AN ORGANIZED HEALTH CARE EDUCATION/TRAINING PROGRAM

## 2022-08-24 PROCEDURE — 4010F PR ACE/ARB THEARPY RXD/TAKEN: ICD-10-PCS | Mod: CPTII,S$GLB,, | Performed by: COLON & RECTAL SURGERY

## 2022-08-24 PROCEDURE — 3008F PR BODY MASS INDEX (BMI) DOCUMENTED: ICD-10-PCS | Mod: CPTII,S$GLB,, | Performed by: COLON & RECTAL SURGERY

## 2022-08-24 PROCEDURE — 1159F PR MEDICATION LIST DOCUMENTED IN MEDICAL RECORD: ICD-10-PCS | Mod: CPTII,S$GLB,, | Performed by: COLON & RECTAL SURGERY

## 2022-08-24 PROCEDURE — 3008F BODY MASS INDEX DOCD: CPT | Mod: CPTII,S$GLB,, | Performed by: COLON & RECTAL SURGERY

## 2022-08-24 NOTE — PROGRESS NOTES
"CRS Office Visit Follow-up  Referring Md:   No referring provider defined for this encounter.    SUBJECTIVE:     Jim Brown is a 51 y.o. male with history of upper rectal giant villous tumor      11-  resected piecemeal by colonoscopy  Findings:        The perianal and digital rectal examinations were normal.        A greater than 50 mm polyp was found in the sigmoid colon. The polyp        was semi-sessile. The polyp was removed with a piecemeal technique        using a hot snare. Resection and retrieval were complete.        Verification of patient identification for the specimen was done.        Estimated blood loss was minimal.        The terminal ileum appeared normal.     Pathology:    RECTUM, BIOPSY:  -Moderately differentiated adenocarcinoma arising within a tubulovillous adenoma with high-grade  dysplasia, see microscopic and comment.  COMMENT:  This case was also reviewed by Dr. Randi Allen, GI Path in our [place] division, who concurs with  the above diagnosis. A complete copy of her report is attached.  Tatyana Gardner M.D.  Report Attached  Performing site:  [facility]        05/11/2022   Feels well.  No pain.  Numb over hypogastrium.  BMs 2 per day, no blood  Appetite and energy levels good.  Voiding and sex function nl.       8/24/2022 - Interval history  Feels well overall, having 1-2BM/day. Denies straining or constipation. No blood per rectum. Eating well    Review of Systems:      OBJECTIVE:     Vital Signs (Most Recent)  Ht 6' 2" (1.88 m)   Wt 118.6 kg (261 lb 8 oz)   BMI 33.57 kg/m²     Physical Exam:  General: White male in no distress   Neuro: alert and oriented x 4.  Moves all extremities.     HEENT: no icterus.  Trachea midline  Respiratory: respirations are even and unlabored  Cardiac: regular rate  Abdomen: Soft, NT/ND. Well healed laparoscopic and pfannenstiel scars  Extremities: Warm dry and intact  Skin: no rashes            ASSESSMENT/PLAN: "     52 y.o. male with history of stage I rectal adenocarcinoma s/p LAR    - CEA level today  - Plan for colonoscopy in October

## 2022-08-25 ENCOUNTER — PATIENT MESSAGE (OUTPATIENT)
Dept: ENDOSCOPY | Facility: HOSPITAL | Age: 52
End: 2022-08-25
Payer: COMMERCIAL

## 2022-08-25 DIAGNOSIS — Z12.11 SPECIAL SCREENING FOR MALIGNANT NEOPLASMS, COLON: Primary | ICD-10-CM

## 2022-08-25 RX ORDER — SOD SULF/POT CHLORIDE/MAG SULF 1.479 G
12 TABLET ORAL DAILY
Qty: 24 TABLET | Refills: 0 | OUTPATIENT
Start: 2022-08-25 | End: 2022-09-19

## 2022-09-18 NOTE — PROGRESS NOTES
[facility] Note    Chief Complaint      Chief Complaint   Patient presents with    Establish Care       History of Present Illness      Jim Brown is a 52 y.o.  M with colon Ca, BPH, Gout, HTN, Anxiety, Male Ed presents to est PCP and to fu chronic issues.     Stage I Colon Ca - Fu by Dr. Webber.  Planning for C-scope in Oct.  CEA < 1.7  - 8/24/22.  S/p partial colectomy - 1/2022.     BPH - Fu by [facility], Dr. Johnson. Pt on Flomax.  Doing ok.  No dizziness.     Gout - Pt Rx Allopurinol 300 mg/d per Podiatry, Dr. Simon.  Has not yet started this.  Was Rx yesterday. Has c/o purple, swelling, pain to Left 2nd toe 3 wks ago. No trauma. Xray - no sign of Fracture.     HTN - Pt on Losartan 50 mg/d.  Prev had cough on Lisinopril.     Anxiety - Pt on Xanax prn. Rarely needs this when stressed and caring for his Mom.  Rx when dx with Colon ca.    Male ED - Pt on Viagra prn.     ? Gout - Pt has been taking Meloxicam 15 mg/d x wk per Podiatry. Rec caution given HTN, and prev partial colectomy.  Will get records from Dr. Simon to review.  He was Rx allopurinol but has not started this.  Had 1 episode of toe pain.     H/o COVID 19 pneumonia - Dec. 2020. Tx with Remdesevir while inpatient  Back to baseline.     HCM - Flu - none - declines;  Tdap - 11/12/14;  PCV 13 - none;  PVX 23 - none;  Shingrix - none - due - defers;  COVID - 19 Vaccine #1 none -declines; Hep C Screen - none - will order;  HIV Screen - none - declines; C-scope - 11/23/21- sched for repeat in Oct.;  PSA - 2.5 - 10/29/21; Prev PCP - Dr. Myers; CRS - Dr. Webber;   - Dr. Johnson; Podiatrist - Dr. Simon; Well visit - 11/4/21      Patient Care Team:  Viviana Bee MD as PCP - General (Internal Medicine)  Joanie Mena LPN (Inactive) as Licensed Practical Nurse (Internal Medicine)  Joanie Mena LPN (Inactive) as Care Coordinator (Internal Medicine)  Jose Juan Simon DPM (Podiatry)     Health Maintenance:  Immunization History    Administered Date(s) Administered    Tdap 11/12/2014      Health Maintenance   Topic Date Due    Hepatitis C Screening  Never done    TETANUS VACCINE  11/12/2024    Lipid Panel  10/29/2026        Past Medical History:  Past Medical History:   Diagnosis Date    Hyperlipidemia 11/14/2014 11/13/2014:        Past Surgical History:   has a past surgical history that includes Colonoscopy (8/10/15); Tonsillectomy; Colonoscopy (N/A, 11/23/2021); Robot-assisted low anterior resection of colon (1/13/2022); Mobilization of splenic flexure (1/13/2022); and Flexible sigmoidoscopy (1/13/2022).    Family History:  family history includes Cancer in his paternal grandmother; Coronary artery disease in his maternal grandfather; Dementia in his mother; Heart attack (age of onset: 55) in his maternal grandfather.     Social History:  Social History     Tobacco Use    Smoking status: Never    Smokeless tobacco: Never   Substance Use Topics    Alcohol use: Yes     Comment: 2-3 on weekends    Drug use: Never       Review of Systems   Constitutional:  Negative for chills, diaphoresis and fever.   HENT:  Positive for tinnitus. Negative for hearing loss.         Mushtaq tinnitus x few yrs   Eyes:  Negative for visual disturbance.   Respiratory:  Negative for chest tightness, shortness of breath and wheezing.    Cardiovascular:  Negative for chest pain, palpitations and leg swelling.   Gastrointestinal:  Negative for abdominal pain, blood in stool, constipation, diarrhea, nausea and vomiting.        Still sensitive near surgical scar.    Endocrine: Negative for cold intolerance and heat intolerance.   Genitourinary:  Negative for bladder incontinence, difficulty urinating, dysuria, frequency and urgency.        Flomax helped nocturia - now 1/night   Neurological:  Negative for dizziness and headaches.   Psychiatric/Behavioral:  Negative for dysphoric mood. The patient is nervous/anxious.       Medications:    Current Outpatient  "Medications:     ALPRAZolam (XANAX) 0.25 MG tablet, Take 1 tablet (0.25 mg total) by mouth 3 (three) times daily as needed (anxiety)., Disp: 60 tablet, Rfl: 0    ascorbic acid, vitamin C, (VITAMIN C) 500 MG tablet, Take 1 tablet (500 mg total) by mouth 2 (two) times daily., Disp:  , Rfl:     multivitamin Tab, Take 1 tablet by mouth once daily., Disp: 30 tablet, Rfl: 11    sildenafiL (VIAGRA) 100 MG tablet, Take 1 tablet (100 mg total) by mouth daily as needed., Disp: 15 tablet, Rfl: 11    tamsulosin (FLOMAX) 0.4 mg Cap, Take 1 capsule (0.4 mg total) by mouth once daily., Disp: 30 capsule, Rfl: 12    allopurinoL (ZYLOPRIM) 300 MG tablet, Take 300 mg by mouth once daily., Disp: , Rfl:     losartan (COZAAR) 50 MG tablet, Take 1 tablet (50 mg total) by mouth once daily., Disp: 90 tablet, Rfl: 1    meloxicam (MOBIC) 15 MG tablet, Take 15 mg by mouth once daily., Disp: , Rfl:   No current facility-administered medications for this visit.    Facility-Administered Medications Ordered in Other Visits:     gabapentin capsule 300 mg, 300 mg, Oral, TID, Didi Claros NP, 300 mg at 01/18/22 0849     Allergies:  Review of patient's allergies indicates:   Allergen Reactions    Lisinopril Other (See Comments)     cough       Physical Exam      Vital Signs  Temp: 98.1 °F (36.7 °C)  Temp src: Oral  Pulse: 83  Resp: 18  SpO2: 98 %  BP: 130/84  BP Location: Left arm  Patient Position: Sitting  Pain Score:   3  Pain Loc: Foot  Height and Weight  Height: 6' 2" (188 cm)  Weight: 120 kg (264 lb 8.8 oz)  BSA (Calculated - sq m): 2.5 sq meters  BMI (Calculated): 34  Weight in (lb) to have BMI = 25: 194.3      Patient Position: Sitting      Physical Exam  Vitals reviewed.   Constitutional:       General: He is not in acute distress.     Appearance: Normal appearance. He is not ill-appearing, toxic-appearing or diaphoretic.   HENT:      Head: Normocephalic and atraumatic.      Right Ear: Tympanic membrane normal.      Left Ear: Tympanic " membrane normal.   Eyes:      Extraocular Movements: Extraocular movements intact.      Conjunctiva/sclera: Conjunctivae normal.      Pupils: Pupils are equal, round, and reactive to light.   Neck:      Vascular: No carotid bruit.   Cardiovascular:      Rate and Rhythm: Normal rate and regular rhythm.      Pulses: Normal pulses.      Heart sounds: Normal heart sounds.   Pulmonary:      Effort: No respiratory distress.      Breath sounds: Normal breath sounds. No wheezing.   Abdominal:      General: Bowel sounds are normal. There is no distension.      Palpations: Abdomen is soft.      Tenderness: There is abdominal tenderness. There is no guarding or rebound.      Comments: Healed surg scars.  Slight Tenderness too lower abdomen   Musculoskeletal:         General: Normal range of motion.   Neurological:      General: No focal deficit present.      Mental Status: He is alert and oriented to person, place, and time.   Psychiatric:         Mood and Affect: Mood normal.         Behavior: Behavior normal.        Laboratory:  CBC:  Recent Labs   Lab 01/13/22  1351 01/14/22  0348 01/18/22  0513   WBC 14.63 H 8.17 5.77   RBC 4.22 L 4.06 L 3.59 L   Hemoglobin 13.7 L 13.0 L 11.5 L   Hematocrit 41.3 40.0 34.2 L   Platelets 130 L 141 L 150   MCV 98 99 H 95   MCH 32.5 H 32.0 H 32.0 H   MCHC 33.2 32.5 33.6       CMP:  Recent Labs   Lab 12/11/20  0256 12/30/20  1238 10/29/21  0946 01/13/22  1351 01/18/22  0513   Glucose 113 H 96 107   < > 93   Calcium 8.6 L 9.5 9.9   < > 8.8   Albumin 3.0 L 3.9 4.1  --   --    Total Protein 6.5 7.4 7.4  --   --    Sodium 140 140 138   < > 137   Potassium 4.8 4.4 4.6   < > 4.3   CO2 22 L 24 30 H   < > 26   Chloride 104 105 103   < > 103   BUN 15 14 17   < > 9   Creatinine 0.8 0.9 1.0   < > 0.8   Alkaline Phosphatase 36 L 42 L 35 L  --   --    ALT 60 H 35 21  --   --    AST 33 21 20  --   --    Total Bilirubin 0.4 0.3 0.8  --   --     < > = values in this interval not displayed.        LIPIDS:  Recent Labs   Lab 10/29/21  0946   HDL 59   Cholesterol 220 H   Triglycerides 156 H   LDL Cholesterol 129.8   HDL/Cholesterol Ratio 26.8   Non-HDL Cholesterol 161   Total Cholesterol/HDL Ratio 3.7        A1C:  Recent Labs   Lab 12/07/20  1639   Hemoglobin A1C 5.2       Other:   Recent Labs   Lab 12/07/20  1639 12/09/20  0303 12/30/20  1238   Vit D, 25-Hydroxy 49  --   --    Ferritin  --    < > 557 H    < > = values in this interval not displayed.     Recent Labs   Lab 10/29/21  0946   PSA, Screen 2.5       Assessment/Plan     Jim Brown is a 52 y.o.male with:    Hypertension, unspecified type  -     losartan (COZAAR) 50 MG tablet; Take 1 tablet (50 mg total) by mouth once daily.  Dispense: 90 tablet; Refill: 1  - Controlled.  Cont current.     Malignant neoplasm of sigmoid colon  - Stable.  Cont current regimen.     Benign prostatic hyperplasia, unspecified whether lower urinary tract symptoms present  - Stable.  Cont current regimen.     Anxiety  - Stable.  Cont current regimen.    Male erectile disorder  - Stable.  Cont current regimen.    Prostate cancer screening  -     PSA, Screening; Future; Expected date: 10/30/2022    Need for hepatitis C screening test  -     Hepatitis C Antibody; Future; Expected date: 09/19/2022    Other Orders  -     Hemoglobin A1C; Future; Expected date: 10/30/2022  -     Comprehensive Metabolic Panel; Future; Expected date: 10/30/2022  -     CBC Auto Differential; Future; Expected date: 10/30/2022  -     T4, Free; Future; Expected date: 10/30/2022  -     TSH; Future; Expected date: 10/30/2022  -     Lipid Panel; Future; Expected date: 10/30/2022       Chronic conditions status updated as per HPI.  Other than changes above, cont current medications and maintain follow up with specialists.  Follow up in about 6 months (around 3/19/2023) for fu chronic issues or sooner if needed, well visit or sooner if needed.      Viviana Bee MD  [facility]

## 2022-09-19 ENCOUNTER — PATIENT MESSAGE (OUTPATIENT)
Dept: ENDOSCOPY | Facility: HOSPITAL | Age: 52
End: 2022-09-19
Payer: COMMERCIAL

## 2022-09-19 ENCOUNTER — OFFICE VISIT (OUTPATIENT)
Dept: PRIMARY CARE CLINIC | Facility: CLINIC | Age: 52
End: 2022-09-19
Payer: COMMERCIAL

## 2022-09-19 VITALS
TEMPERATURE: 98 F | OXYGEN SATURATION: 98 % | HEART RATE: 83 BPM | RESPIRATION RATE: 18 BRPM | SYSTOLIC BLOOD PRESSURE: 130 MMHG | BODY MASS INDEX: 33.95 KG/M2 | DIASTOLIC BLOOD PRESSURE: 84 MMHG | WEIGHT: 264.56 LBS | HEIGHT: 74 IN

## 2022-09-19 DIAGNOSIS — Z11.59 NEED FOR HEPATITIS C SCREENING TEST: ICD-10-CM

## 2022-09-19 DIAGNOSIS — Z12.5 PROSTATE CANCER SCREENING: ICD-10-CM

## 2022-09-19 DIAGNOSIS — I10 HYPERTENSION, UNSPECIFIED TYPE: Primary | ICD-10-CM

## 2022-09-19 DIAGNOSIS — N52.9 MALE ERECTILE DISORDER: ICD-10-CM

## 2022-09-19 DIAGNOSIS — N40.0 BENIGN PROSTATIC HYPERPLASIA, UNSPECIFIED WHETHER LOWER URINARY TRACT SYMPTOMS PRESENT: ICD-10-CM

## 2022-09-19 DIAGNOSIS — F41.9 ANXIETY: ICD-10-CM

## 2022-09-19 DIAGNOSIS — Z00.00 NORMAL PHYSICAL EXAM, ROUTINE: ICD-10-CM

## 2022-09-19 DIAGNOSIS — C18.7 MALIGNANT NEOPLASM OF SIGMOID COLON: ICD-10-CM

## 2022-09-19 DIAGNOSIS — Z13.220 SCREENING FOR LIPOID DISORDERS: ICD-10-CM

## 2022-09-19 PROCEDURE — 99999 PR PBB SHADOW E&M-EST. PATIENT-LVL V: ICD-10-PCS | Mod: PBBFAC,,, | Performed by: INTERNAL MEDICINE

## 2022-09-19 PROCEDURE — 1160F PR REVIEW ALL MEDS BY PRESCRIBER/CLIN PHARMACIST DOCUMENTED: ICD-10-PCS | Mod: CPTII,S$GLB,, | Performed by: INTERNAL MEDICINE

## 2022-09-19 PROCEDURE — 4010F ACE/ARB THERAPY RXD/TAKEN: CPT | Mod: CPTII,S$GLB,, | Performed by: INTERNAL MEDICINE

## 2022-09-19 PROCEDURE — 3079F PR MOST RECENT DIASTOLIC BLOOD PRESSURE 80-89 MM HG: ICD-10-PCS | Mod: CPTII,S$GLB,, | Performed by: INTERNAL MEDICINE

## 2022-09-19 PROCEDURE — 4010F PR ACE/ARB THEARPY RXD/TAKEN: ICD-10-PCS | Mod: CPTII,S$GLB,, | Performed by: INTERNAL MEDICINE

## 2022-09-19 PROCEDURE — 99999 PR PBB SHADOW E&M-EST. PATIENT-LVL V: CPT | Mod: PBBFAC,,, | Performed by: INTERNAL MEDICINE

## 2022-09-19 PROCEDURE — 1159F MED LIST DOCD IN RCRD: CPT | Mod: CPTII,S$GLB,, | Performed by: INTERNAL MEDICINE

## 2022-09-19 PROCEDURE — 3008F BODY MASS INDEX DOCD: CPT | Mod: CPTII,S$GLB,, | Performed by: INTERNAL MEDICINE

## 2022-09-19 PROCEDURE — 1159F PR MEDICATION LIST DOCUMENTED IN MEDICAL RECORD: ICD-10-PCS | Mod: CPTII,S$GLB,, | Performed by: INTERNAL MEDICINE

## 2022-09-19 PROCEDURE — 99214 OFFICE O/P EST MOD 30 MIN: CPT | Mod: S$GLB,,, | Performed by: INTERNAL MEDICINE

## 2022-09-19 PROCEDURE — 1160F RVW MEDS BY RX/DR IN RCRD: CPT | Mod: CPTII,S$GLB,, | Performed by: INTERNAL MEDICINE

## 2022-09-19 PROCEDURE — 99214 PR OFFICE/OUTPT VISIT, EST, LEVL IV, 30-39 MIN: ICD-10-PCS | Mod: S$GLB,,, | Performed by: INTERNAL MEDICINE

## 2022-09-19 PROCEDURE — 3008F PR BODY MASS INDEX (BMI) DOCUMENTED: ICD-10-PCS | Mod: CPTII,S$GLB,, | Performed by: INTERNAL MEDICINE

## 2022-09-19 PROCEDURE — 3075F PR MOST RECENT SYSTOLIC BLOOD PRESS GE 130-139MM HG: ICD-10-PCS | Mod: CPTII,S$GLB,, | Performed by: INTERNAL MEDICINE

## 2022-09-19 PROCEDURE — 3075F SYST BP GE 130 - 139MM HG: CPT | Mod: CPTII,S$GLB,, | Performed by: INTERNAL MEDICINE

## 2022-09-19 PROCEDURE — 3079F DIAST BP 80-89 MM HG: CPT | Mod: CPTII,S$GLB,, | Performed by: INTERNAL MEDICINE

## 2022-09-19 RX ORDER — ALLOPURINOL 300 MG/1
300 TABLET ORAL DAILY
COMMUNITY
Start: 2022-09-16 | End: 2023-03-20

## 2022-09-19 RX ORDER — LOSARTAN POTASSIUM 50 MG/1
50 TABLET ORAL DAILY
Qty: 90 TABLET | Refills: 1 | Status: SHIPPED | OUTPATIENT
Start: 2022-09-19 | End: 2022-12-04

## 2022-09-19 RX ORDER — MELOXICAM 15 MG/1
15 TABLET ORAL DAILY
COMMUNITY
Start: 2022-09-09 | End: 2024-03-18

## 2022-09-20 DIAGNOSIS — Z12.11 COLON CANCER SCREENING: Primary | ICD-10-CM

## 2022-09-20 RX ORDER — SOD SULF/POT CHLORIDE/MAG SULF 1.479 G
12 TABLET ORAL DAILY
Qty: 24 TABLET | Refills: 0 | Status: SHIPPED | OUTPATIENT
Start: 2022-09-20

## 2022-09-23 ENCOUNTER — PATIENT MESSAGE (OUTPATIENT)
Dept: PRIMARY CARE CLINIC | Facility: CLINIC | Age: 52
End: 2022-09-23
Payer: COMMERCIAL

## 2022-10-10 ENCOUNTER — PATIENT MESSAGE (OUTPATIENT)
Dept: ENDOSCOPY | Facility: HOSPITAL | Age: 52
End: 2022-10-10
Payer: COMMERCIAL

## 2022-10-11 ENCOUNTER — ANESTHESIA (OUTPATIENT)
Dept: ENDOSCOPY | Facility: HOSPITAL | Age: 52
End: 2022-10-11
Payer: COMMERCIAL

## 2022-10-11 ENCOUNTER — HOSPITAL ENCOUNTER (OUTPATIENT)
Facility: HOSPITAL | Age: 52
Discharge: HOME | End: 2022-10-11
Attending: COLON & RECTAL SURGERY | Admitting: COLON & RECTAL SURGERY
Payer: COMMERCIAL

## 2022-10-11 ENCOUNTER — ANESTHESIA EVENT (OUTPATIENT)
Dept: ENDOSCOPY | Facility: HOSPITAL | Age: 52
End: 2022-10-11
Payer: COMMERCIAL

## 2022-10-11 VITALS
RESPIRATION RATE: 16 BRPM | SYSTOLIC BLOOD PRESSURE: 122 MMHG | HEART RATE: 62 BPM | WEIGHT: 250 LBS | BODY MASS INDEX: 32.08 KG/M2 | DIASTOLIC BLOOD PRESSURE: 83 MMHG | TEMPERATURE: 98 F | OXYGEN SATURATION: 100 % | HEIGHT: 74 IN

## 2022-10-11 DIAGNOSIS — Z85.038 PERSONAL HISTORY OF COLON CANCER: ICD-10-CM

## 2022-10-11 LAB
CTP QC/QA: YES
SARS-COV-2 AG RESP QL IA.RAPID: NEGATIVE

## 2022-10-11 PROCEDURE — 45385 COLONOSCOPY W/LESION REMOVAL: CPT | Mod: 33,,, | Performed by: COLON & RECTAL SURGERY

## 2022-10-11 PROCEDURE — 45385 COLONOSCOPY W/LESION REMOVAL: CPT | Mod: PT | Performed by: COLON & RECTAL SURGERY

## 2022-10-11 PROCEDURE — 88305 TISSUE EXAM BY PATHOLOGIST: CPT | Performed by: STUDENT IN AN ORGANIZED HEALTH CARE EDUCATION/TRAINING PROGRAM

## 2022-10-11 PROCEDURE — 25000003 PHARM REV CODE 250: Performed by: NURSE ANESTHETIST, CERTIFIED REGISTERED

## 2022-10-11 PROCEDURE — 37000009 HC ANESTHESIA EA ADD 15 MINS: Performed by: COLON & RECTAL SURGERY

## 2022-10-11 PROCEDURE — 88305 TISSUE EXAM BY PATHOLOGIST: CPT | Mod: 26,,, | Performed by: STUDENT IN AN ORGANIZED HEALTH CARE EDUCATION/TRAINING PROGRAM

## 2022-10-11 PROCEDURE — 27201089 HC SNARE, DISP (ANY): Performed by: COLON & RECTAL SURGERY

## 2022-10-11 PROCEDURE — E9220 PRA ENDO ANESTHESIA: HCPCS | Mod: 33,,, | Performed by: NURSE ANESTHETIST, CERTIFIED REGISTERED

## 2022-10-11 PROCEDURE — 63600175 PHARM REV CODE 636 W HCPCS: Performed by: NURSE ANESTHETIST, CERTIFIED REGISTERED

## 2022-10-11 PROCEDURE — E9220 PRA ENDO ANESTHESIA: ICD-10-PCS | Mod: 33,,, | Performed by: NURSE ANESTHETIST, CERTIFIED REGISTERED

## 2022-10-11 PROCEDURE — 45385 PR COLONOSCOPY,REMV LESN,SNARE: ICD-10-PCS | Mod: 33,,, | Performed by: COLON & RECTAL SURGERY

## 2022-10-11 PROCEDURE — 37000008 HC ANESTHESIA 1ST 15 MINUTES: Performed by: COLON & RECTAL SURGERY

## 2022-10-11 PROCEDURE — 88305 TISSUE EXAM BY PATHOLOGIST: ICD-10-PCS | Mod: 26,,, | Performed by: STUDENT IN AN ORGANIZED HEALTH CARE EDUCATION/TRAINING PROGRAM

## 2022-10-11 RX ORDER — PROPOFOL 10 MG/ML
VIAL (ML) INTRAVENOUS CONTINUOUS PRN
Status: DISCONTINUED | OUTPATIENT
Start: 2022-10-11 | End: 2022-10-11

## 2022-10-11 RX ORDER — LIDOCAINE HYDROCHLORIDE 20 MG/ML
INJECTION INTRAVENOUS
Status: DISCONTINUED | OUTPATIENT
Start: 2022-10-11 | End: 2022-10-11

## 2022-10-11 RX ORDER — PROPOFOL 10 MG/ML
VIAL (ML) INTRAVENOUS
Status: DISCONTINUED | OUTPATIENT
Start: 2022-10-11 | End: 2022-10-11

## 2022-10-11 RX ORDER — SODIUM CHLORIDE 9 MG/ML
INJECTION, SOLUTION INTRAVENOUS CONTINUOUS
Status: DISCONTINUED | OUTPATIENT
Start: 2022-10-11 | End: 2022-10-11 | Stop reason: HOSPADM

## 2022-10-11 RX ADMIN — PROPOFOL 100 MG: 10 INJECTION, EMULSION INTRAVENOUS at 09:10

## 2022-10-11 RX ADMIN — Medication 150 MCG/KG/MIN: at 09:10

## 2022-10-11 RX ADMIN — PROPOFOL 25 MG: 10 INJECTION, EMULSION INTRAVENOUS at 09:10

## 2022-10-11 RX ADMIN — LIDOCAINE HYDROCHLORIDE 50 MG: 20 INJECTION INTRAVENOUS at 09:10

## 2022-10-11 RX ADMIN — SODIUM CHLORIDE: 0.9 INJECTION, SOLUTION INTRAVENOUS at 09:10

## 2022-10-11 NOTE — ANESTHESIA RELEASE NOTE
"Anesthesia Release from PACU Note    Patient: Jim Brown    Procedure(s) Performed: Procedure(s) (LRB):  COLONOSCOPY (N/A)    Anesthesia type: general    Post pain: Adequate analgesia    Post assessment: no apparent anesthetic complications    Last Vitals:   Visit Vitals  /73 (BP Location: Left arm, Patient Position: Lying)   Pulse 70   Temp 36.4 °C (97.5 °F) (Temporal)   Resp 18   Ht 6' 2" (1.88 m)   Wt 113.4 kg (250 lb)   SpO2 95%   BMI 32.10 kg/m²       Post vital signs: stable    Level of consciousness: awake and alert     Nausea/Vomiting: no nausea/no vomiting    Complications: none    Airway Patency: patent    Respiratory: unassisted    Cardiovascular: stable and blood pressure at baseline    Hydration: euvolemic  "

## 2022-10-11 NOTE — H&P
[facility] 4th Floor  Colorectal Surgery  History & Physical  Date of procedure: 10/11/2022    Procedure: Colonoscopy  Indications: Previous colon cancer   1. SIGMOID COLON AND RECTUM, LOW ANTERIOR RESECTION:   - No evidence of residual adenoma or malignancy, pT0   - Tattoo site (entirely submitted for histopathologic evaluation)   - Sixteen lymph nodes, negative for metastatic disease (0/16), pN0     Last colonoscopy: 2021 (Complete)  RECTUM, BIOPSY:   -Moderately differentiated adenocarcinoma arising within  a tubulovillous     The patient was informed of the availability of a certified  without charge. A certified  was not necessary for this visit.    Sedation plan: MAC  ASA: ASA 2 - Patient with mild systemic disease with no functional limitations    Review of Systems  See above    Past Medical History:   Diagnosis Date    Hyperlipidemia 11/14/2014 11/13/2014:     Hypertension      Past Surgical History:   Procedure Laterality Date    COLON SURGERY      COLONOSCOPY  08/10/2015    COLONOSCOPY N/A 11/23/2021    Procedure: COLONOSCOPY;  Surgeon: GM Webber MD;  Location: [facility] (4TH FLR);  Service: Endoscopy;  Laterality: N/A;  Covid test 11/23 yulisa Peña emailed to [email or web address]    FLEXIBLE SIGMOIDOSCOPY  01/13/2022    Procedure: SIGMOIDOSCOPY, FLEXIBLE;  Surgeon: GM Webber MD;  Location: [facility] OR 2ND FLR;  Service: Colon and Rectal;;    MOBILIZATION OF SPLENIC FLEXURE  01/13/2022    Procedure: MOBILIZATION, SPLENIC FLEXURE;  Surgeon: GM Webber MD;  Location: [facility] OR 2ND FLR;  Service: Colon and Rectal;;    ROBOT-ASSISTED LOW ANTERIOR RESECTION OF COLON  01/13/2022    Procedure: ROBOTIC RESECTION, COLON, LOW ANTERIOR;  Surgeon: GM Webber MD;  Location: [facility] OR 2ND FLR;  Service: Colon and Rectal;;    TONSILLECTOMY       Family History   Problem Relation Age of Onset    Dementia Mother     Cancer Maternal  Grandfather     Coronary artery disease Maternal Grandfather     Heart attack Maternal Grandfather 55    Cancer Paternal Grandmother         colon cancer     Social History     Tobacco Use    Smoking status: Never    Smokeless tobacco: Never   Substance Use Topics    Alcohol use: Yes     Alcohol/week: 3.0 standard drinks     Types: 3 Drinks containing 0.5 oz of alcohol per week     Comment: 2-3 on weekends    Drug use: Never     Review of patient's allergies indicates:   Allergen Reactions    Lisinopril Other (See Comments)     cough       Prior to Admission medications    Medication Sig Start Date End Date Taking? Authorizing Provider   losartan (COZAAR) 50 MG tablet Take 1 tablet (50 mg total) by mouth once daily. 9/19/22  Yes Viviana Bee MD   meloxicam (MOBIC) 15 MG tablet Take 15 mg by mouth once daily. 9/9/22  Yes Historical Provider   multivitamin Tab Take 1 tablet by mouth once daily. 12/12/20  Yes Roberta Tam MD   sildenafiL (VIAGRA) 100 MG tablet Take 1 tablet (100 mg total) by mouth daily as needed. 11/17/21 11/17/22 Yes Hieu Johnson Jr., MD   sod sulf-pot chloride-mag sulf (SUTAB) 1.479-0.188- 0.225 gram tablet Take 12 tablets by mouth once daily. Take according to package instructions with indicated amount of water. 9/20/22  Yes GM Webber MD   tamsulosin (FLOMAX) 0.4 mg Cap Take 1 capsule (0.4 mg total) by mouth once daily. 11/17/21  Yes Hieu Johnson Jr., MD   allopurinoL (ZYLOPRIM) 300 MG tablet Take 300 mg by mouth once daily. 9/16/22   Historical Provider   ALPRAZolam (XANAX) 0.25 MG tablet Take 1 tablet (0.25 mg total) by mouth 3 (three) times daily as needed (anxiety). 12/30/21 9/19/22  Didi Claros NP   ascorbic acid, vitamin C, (VITAMIN C) 500 MG tablet Take 1 tablet (500 mg total) by mouth 2 (two) times daily. 12/11/20   Roberta Tam MD       Physical Examination  /83 (BP Location: Left arm, Patient Position: Lying)   Pulse 65   Temp 98.1 °F (36.7 °C)  "(Temporal)   Resp 18   Ht 6' 2" (1.88 m)   Wt 113.4 kg (250 lb)   SpO2 95%   BMI 32.10 kg/m²      Constitutional: well developed, no cough, no dyspnea, alert, and no acute distress    Head: Normocephalic, no lesions, without obvious abnormality  Eye: Normal external eye, conjunctiva, and lids, PERRL  Ears: Normal TM's bilaterally, normal auditory canals and external ears, non-tender  Cardiovascular: regular rate and regular rhythm  Respiratory: normal air entry  Gastrointestinal: soft, non-tender, without masses or organomegaly  Neurologic: alert, oriented, normal speech, no focal findings or movement disorder noted  Psychiatric: appropriate, normal mood    Plan of Care    It was a pleasure meeting Mr. Brown today - we will plan to perform a colonoscopy with monitored anesthesia care. The details of the procedure, the possible need for biopsy or polypectomy and the potential risks including bleeding, perforation, missed polyps were discussed in detail and they consented to undergo the procedure.      Kate Mcmullen MD  Colorectal Surgery  [facility] 4th [facility]    "

## 2022-10-11 NOTE — ANESTHESIA PREPROCEDURE EVALUATION
10/11/2022  Jim Brown is a 52 y.o., male.  Past Medical History:   Diagnosis Date    Hyperlipidemia 11/14/2014 11/13/2014:      Past Surgical History:   Procedure Laterality Date    COLONOSCOPY  8/10/15    COLONOSCOPY N/A 11/23/2021    Procedure: COLONOSCOPY;  Surgeon: GM Webber MD;  Location: [facility] ENDO (4TH FLR);  Service: Endoscopy;  Laterality: N/A;  Covid test 11/23 yulisa Peña emailed to [email or web address]    FLEXIBLE SIGMOIDOSCOPY  1/13/2022    Procedure: SIGMOIDOSCOPY, FLEXIBLE;  Surgeon: GM Webber MD;  Location: [facility] OR 2ND FLR;  Service: Colon and Rectal;;    MOBILIZATION OF SPLENIC FLEXURE  1/13/2022    Procedure: MOBILIZATION, SPLENIC FLEXURE;  Surgeon: GM Webber MD;  [facility] 2[facility] FLR;  Service: Colon and Rectal;;    ROBOT-ASSISTED LOW ANTERIOR RESECTION OF COLON  1/13/2022    Procedure: ROBOTIC RESECTION, COLON, LOW ANTERIOR;  Surgeon: GM Webber MD;  Location: [facility] OR 2ND FLR;  Service: Colon and Rectal;;    TONSILLECTOMY         Pre-op Assessment    I have reviewed the Patient Summary Reports.     I have reviewed the Nursing Notes. I have reviewed the NPO Status.   I have reviewed the Medications.     Review of Systems  Anesthesia Hx:  No problems with previous Anesthesia  Denies Family Hx of Anesthesia complications.   Denies Personal Hx of Anesthesia complications.   Hematology/Oncology:  Hematology Normal        Cardiovascular:   Hypertension    Pulmonary:   Pneumonia    Hepatic/GI:   Bowel Prep.    Musculoskeletal:  Musculoskeletal Normal    Neurological:  Neurology Normal    Dermatological:  Skin Normal        Physical Exam  General: Well nourished    Airway:  Mallampati: II   Mouth Opening: Normal  TM Distance: Normal  Tongue: Normal  Neck ROM: Normal ROM    Dental:  Intact        Anesthesia Plan  Type of  Anesthesia, risks & benefits discussed:    Anesthesia Type: Gen Natural Airway  Intra-op Monitoring Plan: Standard ASA Monitors  Informed Consent: Informed consent signed with the Patient and all parties understand the risks and agree with anesthesia plan.  All questions answered.   ASA Score: 2  Day of Surgery Review of History & Physical: H&P Update referred to the surgeon/provider.I have interviewed and examined the patient. I have reviewed the patient's H&P dated: There are no significant changes.     Ready For Surgery From Anesthesia Perspective.     .

## 2022-10-11 NOTE — PROVATION PATIENT INSTRUCTIONS
Discharge Summary/Instructions after an Endoscopic Procedure  Patient Name: Jim Brown  Patient MRN: [id number]  Patient YOB: 1970 Tuesday, October 11, 2022  Miller Webber MD  Dear patient,  As a result of recent federal legislation (The Federal Cures Act), you may   receive lab or pathology results from your procedure in your [facility]   account before your physician is able to contact you. Your physician or   their representative will relay the results to you with their   recommendations at their soonest availability.  Thank you,  RESTRICTIONS:  During your procedure today, you received medications for sedation.  These   medications may affect your judgment, balance and coordination.  Therefore,   for 24 hours, you have the following restrictions:   - DO NOT drive a car, operate machinery, make legal/financial decisions,   sign important papers or drink alcohol.    ACTIVITY:  Today: no heavy lifting, straining or running due to procedural   sedation/anesthesia.  The following day: return to full activity including work.  DIET:  Eat and drink normally unless instructed otherwise.     TREATMENT FOR COMMON SIDE EFFECTS:  - Mild abdominal pain, nausea, belching, bloating or excessive gas:  rest,   eat lightly and use a heating pad.  - Sore Throat: treat with throat lozenges and/or gargle with warm salt   water.  - Because air was used during the procedure, expelling large amounts of air   from your rectum or belching is normal.  - If a bowel prep was taken, you may not have a bowel movement for 1-3 days.    This is normal.  SYMPTOMS TO WATCH FOR AND REPORT TO YOUR PHYSICIAN:  1. Abdominal pain or bloating, other than gas cramps.  2. Chest pain.  3. Back pain.  4. Signs of infection such as: chills or fever occurring within 24 hours   after the procedure.  5. Rectal bleeding, which would show as bright red, maroon, or black stools.   (A tablespoon of blood from the rectum is not serious, especially if    hemorrhoids are present.)  6. Vomiting.  7. Weakness or dizziness.  GO DIRECTLY TO THE NEAREST EMERGENCY ROOM IF YOU HAVE ANY OF THE FOLLOWING:      Difficulty breathing              Chills and/or fever over 101 F   Persistent vomiting and/or vomiting blood   Severe abdominal pain   Severe chest pain   Black, tarry stools   Bleeding- more than one tablespoon   Any other symptom or condition that you feel may need urgent attention  Your doctor recommends these additional instructions:  If any biopsies were taken, your doctors clinic will contact you in 1 to 2   weeks with any results.  - Discharge patient to home (ambulatory).   - Patient has a contact number available for emergencies.  The signs and   symptoms of potential delayed complications were discussed with the   patient.  Return to normal activities tomorrow.  Written discharge   instructions were provided to the patient.   - Resume previous diet.   - Continue present medications.   - Await pathology results.   - Repeat colonoscopy in 3 years for surveillance based on pathology results.     - Return to my office as previously scheduled.  For questions, problems or results please call your physician - Miller Webber MD at Work:  [phone].  [facility] [place], EMERGENCY ROOM PHONE NUMBER: [phone]  IF A COMPLICATION OR EMERGENCY SITUATION ARISES AND YOU ARE UNABLE TO REACH   YOUR PHYSICIAN - GO DIRECTLY TO THE EMERGENCY ROOM.  Miller Webber MD  10/11/2022 9:56:39 AM  This report has been verified and signed electronically.  Dear patient,  As a result of recent federal legislation (The Federal Cures Act), you may   receive lab or pathology results from your procedure in your [facility]   account before your physician is able to contact you. Your physician or   their representative will relay the results to you with their   recommendations at their soonest availability.  Thank you,  PROVATION

## 2022-10-11 NOTE — TRANSFER OF CARE
"Anesthesia Transfer of Care Note    Patient: Jim Brown    Procedure(s) Performed: Procedure(s) (LRB):  COLONOSCOPY (N/A)    Patient location: GI    Anesthesia Type: general    Transport from OR: Transported from OR on room air with adequate spontaneous ventilation    Post pain: adequate analgesia    Post assessment: no apparent anesthetic complications    Post vital signs: stable    Level of consciousness: awake    Nausea/Vomiting: no nausea/vomiting    Complications: none    Transfer of care protocol was followed      Last vitals:   Visit Vitals  /73 (BP Location: Left arm, Patient Position: Lying)   Pulse 70   Temp 36.4 °C (97.5 °F) (Temporal)   Resp 18   Ht 6' 2" (1.88 m)   Wt 113.4 kg (250 lb)   SpO2 95%   BMI 32.10 kg/m²     "

## 2022-10-11 NOTE — ANESTHESIA POSTPROCEDURE EVALUATION
Anesthesia Post Evaluation    Patient: Jim Brown    Procedure(s) Performed: Procedure(s) (LRB):  COLONOSCOPY (N/A)    Final Anesthesia Type: general      Patient location during evaluation: GI PACU  Patient participation: Yes- Able to Participate  Level of consciousness: awake and alert  Post-procedure vital signs: reviewed and stable  Pain management: adequate  Airway patency: patent    PONV status at discharge: No PONV  Anesthetic complications: no      Cardiovascular status: blood pressure returned to baseline  Respiratory status: unassisted and spontaneous ventilation  Hydration status: euvolemic  Follow-up not needed.          Vitals Value Taken Time   /73 10/11/22 0957   Temp 36.4 °C (97.5 °F) 10/11/22 0957   Pulse 70 10/11/22 0957   Resp 18 10/11/22 0957   SpO2 95 % 10/11/22 0957         No case tracking events are documented in the log.      Pain/Traci Score: Traci Score: 9 (10/11/2022  9:59 AM)

## 2022-10-13 LAB
FINAL PATHOLOGIC DIAGNOSIS: NORMAL
GROSS: NORMAL
Lab: NORMAL

## 2022-10-18 NOTE — PROGRESS NOTES
Family and personal hx of colon CA.  Early adenoma at [age] year    Repeat colonoscopy in 2 year    If you have any questions or if I can clarify any of the above please contact me:    Mobile [phone]   Pager [phone]  email [email or web address]  EPIC message  Nurse Maya Adler [phone]  :  [phone]    Sincerely  HMiller MD, FACS, FASCRS  Staff Surgeon  Dept of Colon and Rectal Surgery

## 2022-10-24 ENCOUNTER — PATIENT MESSAGE (OUTPATIENT)
Dept: PRIMARY CARE CLINIC | Facility: CLINIC | Age: 52
End: 2022-10-24
Payer: COMMERCIAL

## 2022-11-02 ENCOUNTER — LAB VISIT (OUTPATIENT)
Dept: LAB | Facility: HOSPITAL | Age: 52
End: 2022-11-02
Attending: INTERNAL MEDICINE
Payer: COMMERCIAL

## 2022-11-02 DIAGNOSIS — Z13.220 SCREENING FOR LIPOID DISORDERS: ICD-10-CM

## 2022-11-02 DIAGNOSIS — Z12.5 PROSTATE CANCER SCREENING: ICD-10-CM

## 2022-11-02 DIAGNOSIS — Z00.00 NORMAL PHYSICAL EXAM, ROUTINE: ICD-10-CM

## 2022-11-02 DIAGNOSIS — Z11.59 NEED FOR HEPATITIS C SCREENING TEST: ICD-10-CM

## 2022-11-02 LAB
ALBUMIN SERPL BCP-MCNC: 3.9 G/DL (ref 3.5–5.2)
ALP SERPL-CCNC: 26 U/L (ref 55–135)
ALT SERPL W/O P-5'-P-CCNC: 21 U/L (ref 10–44)
ANION GAP SERPL CALC-SCNC: 9 MMOL/L (ref 8–16)
AST SERPL-CCNC: 18 U/L (ref 10–40)
BASOPHILS # BLD AUTO: 0.04 K/UL (ref 0–0.2)
BASOPHILS NFR BLD: 0.7 % (ref 0–1.9)
BILIRUB SERPL-MCNC: 0.6 MG/DL (ref 0.1–1)
BUN SERPL-MCNC: 17 MG/DL (ref 6–20)
CALCIUM SERPL-MCNC: 9.3 MG/DL (ref 8.7–10.5)
CHLORIDE SERPL-SCNC: 105 MMOL/L (ref 95–110)
CHOLEST SERPL-MCNC: 204 MG/DL (ref 120–199)
CHOLEST/HDLC SERPL: 4.3 {RATIO} (ref 2–5)
CO2 SERPL-SCNC: 26 MMOL/L (ref 23–29)
COMPLEXED PSA SERPL-MCNC: 3.1 NG/ML (ref 0–4)
CREAT SERPL-MCNC: 0.9 MG/DL (ref 0.5–1.4)
DIFFERENTIAL METHOD: ABNORMAL
EOSINOPHIL # BLD AUTO: 0.3 K/UL (ref 0–0.5)
EOSINOPHIL NFR BLD: 6.1 % (ref 0–8)
ERYTHROCYTE [DISTWIDTH] IN BLOOD BY AUTOMATED COUNT: 11.9 % (ref 11.5–14.5)
EST. GFR  (NO RACE VARIABLE): >60 ML/MIN/1.73 M^2
ESTIMATED AVG GLUCOSE: 100 MG/DL (ref 68–131)
GLUCOSE SERPL-MCNC: 102 MG/DL (ref 70–110)
HBA1C MFR BLD: 5.1 % (ref 4–5.6)
HCT VFR BLD AUTO: 43 % (ref 40–54)
HCV AB SERPL QL IA: NORMAL
HDLC SERPL-MCNC: 48 MG/DL (ref 40–75)
HDLC SERPL: 23.5 % (ref 20–50)
HGB BLD-MCNC: 13.7 G/DL (ref 14–18)
IMM GRANULOCYTES # BLD AUTO: 0.03 K/UL (ref 0–0.04)
IMM GRANULOCYTES NFR BLD AUTO: 0.5 % (ref 0–0.5)
LDLC SERPL CALC-MCNC: 132.2 MG/DL (ref 63–159)
LYMPHOCYTES # BLD AUTO: 1.8 K/UL (ref 1–4.8)
LYMPHOCYTES NFR BLD: 32.4 % (ref 18–48)
MCH RBC QN AUTO: 31.3 PG (ref 27–31)
MCHC RBC AUTO-ENTMCNC: 31.9 G/DL (ref 32–36)
MCV RBC AUTO: 98 FL (ref 82–98)
MONOCYTES # BLD AUTO: 0.5 K/UL (ref 0.3–1)
MONOCYTES NFR BLD: 8.4 % (ref 4–15)
NEUTROPHILS # BLD AUTO: 2.9 K/UL (ref 1.8–7.7)
NEUTROPHILS NFR BLD: 51.9 % (ref 38–73)
NONHDLC SERPL-MCNC: 156 MG/DL
NRBC BLD-RTO: 0 /100 WBC
PLATELET # BLD AUTO: 128 K/UL (ref 150–450)
PMV BLD AUTO: 11.2 FL (ref 9.2–12.9)
POTASSIUM SERPL-SCNC: 4.4 MMOL/L (ref 3.5–5.1)
PROT SERPL-MCNC: 6.7 G/DL (ref 6–8.4)
RBC # BLD AUTO: 4.38 M/UL (ref 4.6–6.2)
SODIUM SERPL-SCNC: 140 MMOL/L (ref 136–145)
T4 FREE SERPL-MCNC: 0.9 NG/DL (ref 0.71–1.51)
TRIGL SERPL-MCNC: 119 MG/DL (ref 30–150)
TSH SERPL DL<=0.005 MIU/L-ACNC: 1.61 UIU/ML (ref 0.4–4)
WBC # BLD AUTO: 5.59 K/UL (ref 3.9–12.7)

## 2022-11-02 PROCEDURE — 83036 HEMOGLOBIN GLYCOSYLATED A1C: CPT | Performed by: INTERNAL MEDICINE

## 2022-11-02 PROCEDURE — 85025 COMPLETE CBC W/AUTO DIFF WBC: CPT | Performed by: INTERNAL MEDICINE

## 2022-11-02 PROCEDURE — 84439 ASSAY OF FREE THYROXINE: CPT | Performed by: INTERNAL MEDICINE

## 2022-11-02 PROCEDURE — 84443 ASSAY THYROID STIM HORMONE: CPT | Performed by: INTERNAL MEDICINE

## 2022-11-02 PROCEDURE — 36415 COLL VENOUS BLD VENIPUNCTURE: CPT | Mod: PN | Performed by: INTERNAL MEDICINE

## 2022-11-02 PROCEDURE — 86803 HEPATITIS C AB TEST: CPT | Performed by: INTERNAL MEDICINE

## 2022-11-02 PROCEDURE — 84153 ASSAY OF PSA TOTAL: CPT | Performed by: INTERNAL MEDICINE

## 2022-11-02 PROCEDURE — 80053 COMPREHEN METABOLIC PANEL: CPT | Performed by: INTERNAL MEDICINE

## 2022-11-02 PROCEDURE — 80061 LIPID PANEL: CPT | Performed by: INTERNAL MEDICINE

## 2022-11-06 DIAGNOSIS — E78.5 HYPERLIPIDEMIA, UNSPECIFIED HYPERLIPIDEMIA TYPE: Primary | ICD-10-CM

## 2022-11-07 NOTE — PROGRESS NOTES
I sent pt a my chart message -  I reviewed your lbs.  Your PSA was normal at 3.1. Your Hepatitis C screening was negative. Your thyroid functions are normal.  Your Cholesterol looked slightly high. You do not require medication for this at this time but I do recommend you follow a low cholesterol diet and exercise.  You can visit the American heart association website at heart.org for further info on a low cholesterol diet.I recommend we repeat your cholesterol in 6 mos.   Your kidney function and liver functions looked good.  Your anemia has improved.  Your Ha1c was normal at 5.1.No further recommendations at this time.    Dr. VALE

## 2022-11-09 RX ORDER — ALPRAZOLAM 0.25 MG/1
0.25 TABLET ORAL 3 TIMES DAILY PRN
Qty: 60 TABLET | Refills: 0 | Status: SHIPPED | OUTPATIENT
Start: 2022-11-09 | End: 2022-12-27 | Stop reason: SDUPTHER

## 2022-12-01 ENCOUNTER — OFFICE VISIT (OUTPATIENT)
Dept: UROLOGY | Facility: CLINIC | Age: 52
End: 2022-12-01
Payer: COMMERCIAL

## 2022-12-01 VITALS
BODY MASS INDEX: 32.08 KG/M2 | WEIGHT: 250 LBS | SYSTOLIC BLOOD PRESSURE: 142 MMHG | HEART RATE: 79 BPM | DIASTOLIC BLOOD PRESSURE: 95 MMHG | HEIGHT: 74 IN

## 2022-12-01 DIAGNOSIS — R97.20 ELEVATED PSA: Primary | ICD-10-CM

## 2022-12-01 DIAGNOSIS — N52.9 ERECTILE DYSFUNCTION, UNSPECIFIED ERECTILE DYSFUNCTION TYPE: ICD-10-CM

## 2022-12-01 DIAGNOSIS — N13.8 BPH WITH URINARY OBSTRUCTION: ICD-10-CM

## 2022-12-01 DIAGNOSIS — N40.1 BPH WITH URINARY OBSTRUCTION: ICD-10-CM

## 2022-12-01 PROCEDURE — 4010F ACE/ARB THERAPY RXD/TAKEN: CPT | Mod: CPTII,S$GLB,, | Performed by: UROLOGY

## 2022-12-01 PROCEDURE — 1159F MED LIST DOCD IN RCRD: CPT | Mod: CPTII,S$GLB,, | Performed by: UROLOGY

## 2022-12-01 PROCEDURE — 1159F PR MEDICATION LIST DOCUMENTED IN MEDICAL RECORD: ICD-10-PCS | Mod: CPTII,S$GLB,, | Performed by: UROLOGY

## 2022-12-01 PROCEDURE — 99214 PR OFFICE/OUTPT VISIT, EST, LEVL IV, 30-39 MIN: ICD-10-PCS | Mod: S$GLB,,, | Performed by: UROLOGY

## 2022-12-01 PROCEDURE — 3008F BODY MASS INDEX DOCD: CPT | Mod: CPTII,S$GLB,, | Performed by: UROLOGY

## 2022-12-01 PROCEDURE — 3044F HG A1C LEVEL LT 7.0%: CPT | Mod: CPTII,S$GLB,, | Performed by: UROLOGY

## 2022-12-01 PROCEDURE — 3077F PR MOST RECENT SYSTOLIC BLOOD PRESSURE >= 140 MM HG: ICD-10-PCS | Mod: CPTII,S$GLB,, | Performed by: UROLOGY

## 2022-12-01 PROCEDURE — 99999 PR PBB SHADOW E&M-EST. PATIENT-LVL III: ICD-10-PCS | Mod: PBBFAC,,, | Performed by: UROLOGY

## 2022-12-01 PROCEDURE — 1160F PR REVIEW ALL MEDS BY PRESCRIBER/CLIN PHARMACIST DOCUMENTED: ICD-10-PCS | Mod: CPTII,S$GLB,, | Performed by: UROLOGY

## 2022-12-01 PROCEDURE — 3077F SYST BP >= 140 MM HG: CPT | Mod: CPTII,S$GLB,, | Performed by: UROLOGY

## 2022-12-01 PROCEDURE — 3080F DIAST BP >= 90 MM HG: CPT | Mod: CPTII,S$GLB,, | Performed by: UROLOGY

## 2022-12-01 PROCEDURE — 99999 PR PBB SHADOW E&M-EST. PATIENT-LVL III: CPT | Mod: PBBFAC,,, | Performed by: UROLOGY

## 2022-12-01 PROCEDURE — 1160F RVW MEDS BY RX/DR IN RCRD: CPT | Mod: CPTII,S$GLB,, | Performed by: UROLOGY

## 2022-12-01 PROCEDURE — 3080F PR MOST RECENT DIASTOLIC BLOOD PRESSURE >= 90 MM HG: ICD-10-PCS | Mod: CPTII,S$GLB,, | Performed by: UROLOGY

## 2022-12-01 PROCEDURE — 99214 OFFICE O/P EST MOD 30 MIN: CPT | Mod: S$GLB,,, | Performed by: UROLOGY

## 2022-12-01 PROCEDURE — 4010F PR ACE/ARB THEARPY RXD/TAKEN: ICD-10-PCS | Mod: CPTII,S$GLB,, | Performed by: UROLOGY

## 2022-12-01 PROCEDURE — 3008F PR BODY MASS INDEX (BMI) DOCUMENTED: ICD-10-PCS | Mod: CPTII,S$GLB,, | Performed by: UROLOGY

## 2022-12-01 PROCEDURE — 3044F PR MOST RECENT HEMOGLOBIN A1C LEVEL <7.0%: ICD-10-PCS | Mod: CPTII,S$GLB,, | Performed by: UROLOGY

## 2022-12-01 RX ORDER — TAMSULOSIN HYDROCHLORIDE 0.4 MG/1
0.4 CAPSULE ORAL DAILY
Qty: 30 CAPSULE | Refills: 12 | Status: SHIPPED | OUTPATIENT
Start: 2022-12-01 | End: 2024-03-18

## 2022-12-01 RX ORDER — SILDENAFIL 100 MG/1
100 TABLET, FILM COATED ORAL DAILY PRN
Qty: 30 TABLET | Refills: 11 | Status: SHIPPED | OUTPATIENT
Start: 2022-12-01 | End: 2023-12-01

## 2022-12-01 NOTE — PROGRESS NOTES
Subjective:       Patient ID: Jim Brown is a 52 y.o. male.    Chief Complaint: Medication Refill    HPI patient is here for refills of Flomax and Viagra.  He also has an increasing PSA.  I think he needs an MRI scan.  The Flomax helps him void.  Unfortunately he had a recent bout with colon cancer and is doing well at this time    Past Medical History:   Diagnosis Date    Hyperlipidemia 11/14/2014 11/13/2014:     Hypertension        Past Surgical History:   Procedure Laterality Date    COLON SURGERY      COLONOSCOPY  08/10/2015    COLONOSCOPY N/A 11/23/2021    Procedure: COLONOSCOPY;  Surgeon: GM Webber MD;  Location: [facility] ENDO (4TH FLR);  Service: Endoscopy;  Laterality: N/A;  Covid test 11/23 yulisa Peña emailed to [email or web address]    COLONOSCOPY N/A 10/11/2022    Procedure: COLONOSCOPY;  Surgeon: GM Webber MD;  Location: [facility] ENDO (4TH FLR);  Service: Endoscopy;  Laterality: N/A;  Rapid, instr. via mail & portal, clears 4 hrs prior, am prep -PC    FLEXIBLE SIGMOIDOSCOPY  01/13/2022    Procedure: SIGMOIDOSCOPY, FLEXIBLE;  Surgeon: GM Webber MD;  [facility] F[facility];  Service: Colon and Rectal;;    MOBILIZATION OF SPLENIC FLEXURE  01/13/2022    Procedure: MOBILIZATION, SPLENIC FLEXURE;  Surgeon: GM Webber MD;  [facility] 2ND FLR;  Service: Colon and Rectal;;    ROBOT-ASSISTED LOW ANTERIOR RESECTION OF COLON  01/13/2022    Procedure: ROBOTIC RESECTION, COLON, LOW ANTERIOR;  Surgeon: GM Webber MD;  [facility] 2ND F[facility];  Service: Colon and Rectal;;    TONSILLECTOMY         Family History   Problem Relation Age of Onset    Dementia Mother     Cancer Maternal Grandfather     Coronary artery disease Maternal Grandfather     Heart attack Maternal Grandfather 55    Cancer Paternal Grandmother         colon cancer       Social History     Socioeconomic History    Marital status:    Tobacco Use    Smoking status: Never     Smokeless tobacco: Never   Substance and Sexual Activity    Alcohol use: Yes     Alcohol/week: 3.0 standard drinks     Types: 3 Drinks containing 0.5 oz of alcohol per week     Comment: 2-3 on weekends    Drug use: Never    Sexual activity: Yes     Partners: Female       Allergies:  Lisinopril    Medications:    Current Outpatient Medications:     allopurinoL (ZYLOPRIM) 300 MG tablet, Take 300 mg by mouth once daily., Disp: , Rfl:     ALPRAZolam (XANAX) 0.25 MG tablet, Take 1 tablet (0.25 mg total) by mouth 3 (three) times daily as needed (anxiety)., Disp: 60 tablet, Rfl: 0    ascorbic acid, vitamin C, (VITAMIN C) 500 MG tablet, Take 1 tablet (500 mg total) by mouth 2 (two) times daily., Disp:  , Rfl:     losartan (COZAAR) 50 MG tablet, Take 1 tablet (50 mg total) by mouth once daily., Disp: 90 tablet, Rfl: 1    meloxicam (MOBIC) 15 MG tablet, Take 15 mg by mouth once daily., Disp: , Rfl:     multivitamin Tab, Take 1 tablet by mouth once daily., Disp: 30 tablet, Rfl: 11    sod sulf-pot chloride-mag sulf (SUTAB) 1.479-0.188- 0.225 gram tablet, Take 12 tablets by mouth once daily. Take according to package instructions with indicated amount of water., Disp: 24 tablet, Rfl: 0    tamsulosin (FLOMAX) 0.4 mg Cap, TAKE 1 CAPSULE(0.4 MG) BY MOUTH EVERY DAY, Disp: 30 capsule, Rfl: 12    sildenafiL (VIAGRA) 100 MG tablet, Take 1 tablet (100 mg total) by mouth daily as needed., Disp: 30 tablet, Rfl: 11    tamsulosin (FLOMAX) 0.4 mg Cap, Take 1 capsule (0.4 mg total) by mouth once daily., Disp: 30 capsule, Rfl: 12  No current facility-administered medications for this visit.    Facility-Administered Medications Ordered in Other Visits:     gabapentin capsule 300 mg, 300 mg, Oral, TID, Didi Claros NP, 300 mg at 01/18/22 0849    Review of Systems   Constitutional:  Negative for activity change, appetite change, chills, diaphoresis, fatigue, fever and unexpected weight change.   HENT:  Negative for congestion, dental  problem, hearing loss, mouth sores, postnasal drip, rhinorrhea, sinus pressure and trouble swallowing.    Eyes:  Negative for pain, discharge and itching.   Respiratory:  Negative for apnea, cough, choking, chest tightness, shortness of breath and wheezing.    Cardiovascular:  Negative for chest pain, palpitations and leg swelling.   Gastrointestinal:  Negative for abdominal distention, abdominal pain, anal bleeding, blood in stool, constipation, diarrhea, nausea, rectal pain and vomiting.   Endocrine: Negative for polydipsia and polyuria.   Genitourinary:  Negative for decreased urine volume, difficulty urinating, dysuria, enuresis, flank pain, frequency, genital sores, hematuria, penile discharge, penile pain, penile swelling, scrotal swelling, testicular pain and urgency.   Musculoskeletal:  Negative for arthralgias, back pain and myalgias.   Skin:  Negative for color change, rash and wound.   Neurological:  Negative for dizziness, syncope, speech difficulty, light-headedness and headaches.   Hematological:  Negative for adenopathy. Does not bruise/bleed easily.   Psychiatric/Behavioral:  Negative for behavioral problems, confusion, hallucinations and sleep disturbance.      Objective:      Physical Exam  Constitutional:       Appearance: He is well-developed.   HENT:      Head: Normocephalic.   Cardiovascular:      Rate and Rhythm: Normal rate.   Pulmonary:      Effort: Pulmonary effort is normal.   Abdominal:      Palpations: Abdomen is soft.   Genitourinary:     Prostate: Normal.      Comments: 30-35 g benign  Skin:     General: Skin is warm.   Neurological:      Mental Status: He is alert.       Assessment:       1. Elevated PSA    2. BPH with urinary obstruction    3. Erectile dysfunction, unspecified erectile dysfunction type          Plan:       Jim was seen today for medication refill.    Diagnoses and all orders for this visit:    Elevated PSA  -     MRI Prostate W W/O Contrast; Future    BPH with  urinary obstruction    Erectile dysfunction, unspecified erectile dysfunction type    Other orders  -     tamsulosin (FLOMAX) 0.4 mg Cap; Take 1 capsule (0.4 mg total) by mouth once daily.  -     sildenafiL (VIAGRA) 100 MG tablet; Take 1 tablet (100 mg total) by mouth daily as needed.

## 2022-12-23 ENCOUNTER — PATIENT MESSAGE (OUTPATIENT)
Dept: SURGERY | Facility: CLINIC | Age: 52
End: 2022-12-23
Payer: COMMERCIAL

## 2022-12-23 ENCOUNTER — PATIENT MESSAGE (OUTPATIENT)
Dept: PRIMARY CARE CLINIC | Facility: CLINIC | Age: 52
End: 2022-12-23
Payer: COMMERCIAL

## 2022-12-27 RX ORDER — ALPRAZOLAM 0.25 MG/1
0.25 TABLET ORAL 3 TIMES DAILY PRN
Qty: 60 TABLET | Refills: 0 | Status: SHIPPED | OUTPATIENT
Start: 2022-12-27 | End: 2023-02-28 | Stop reason: SDUPTHER

## 2022-12-27 NOTE — TELEPHONE ENCOUNTER
LOV 9/19/22      Dr Webber wasn't able to approve my prescription refill for Xanax through his phone  Pt would like to Know can you Give him A refill    Please Advised

## 2022-12-27 NOTE — TELEPHONE ENCOUNTER
No new care gaps identified.  [facility] Embedded Care Gaps. Reference number: [id number]. 12/27/2022   8:27:55 AM CST

## 2022-12-29 ENCOUNTER — HOSPITAL ENCOUNTER (OUTPATIENT)
Dept: RADIOLOGY | Facility: HOSPITAL | Age: 52
Discharge: HOME | End: 2022-12-29
Attending: UROLOGY
Payer: COMMERCIAL

## 2022-12-29 DIAGNOSIS — R97.20 ELEVATED PSA: ICD-10-CM

## 2022-12-29 PROCEDURE — 25500020 PHARM REV CODE 255: Performed by: UROLOGY

## 2022-12-29 PROCEDURE — 72197 MRI PELVIS W/O & W/DYE: CPT | Mod: 26,,, | Performed by: INTERNAL MEDICINE

## 2022-12-29 PROCEDURE — 72197 MRI PROSTATE W W/O CONTRAST: ICD-10-PCS | Mod: 26,,, | Performed by: INTERNAL MEDICINE

## 2022-12-29 PROCEDURE — A9585 GADOBUTROL INJECTION: HCPCS | Performed by: UROLOGY

## 2022-12-29 PROCEDURE — 72197 MRI PELVIS W/O & W/DYE: CPT | Mod: TC

## 2022-12-29 RX ORDER — GADOBUTROL 604.72 MG/ML
10 INJECTION INTRAVENOUS
Status: COMPLETED | OUTPATIENT
Start: 2022-12-29 | End: 2022-12-29

## 2022-12-29 RX ADMIN — GADOBUTROL 10 ML: 604.72 INJECTION INTRAVENOUS at 07:12

## 2023-01-04 ENCOUNTER — PATIENT MESSAGE (OUTPATIENT)
Dept: UROLOGY | Facility: CLINIC | Age: 53
End: 2023-01-04
Payer: COMMERCIAL

## 2023-01-11 ENCOUNTER — LAB VISIT (OUTPATIENT)
Dept: LAB | Facility: HOSPITAL | Age: 53
End: 2023-01-11
Attending: COLON & RECTAL SURGERY
Payer: COMMERCIAL

## 2023-01-11 ENCOUNTER — OFFICE VISIT (OUTPATIENT)
Dept: SURGERY | Facility: CLINIC | Age: 53
End: 2023-01-11
Payer: COMMERCIAL

## 2023-01-11 VITALS
WEIGHT: 268 LBS | HEART RATE: 81 BPM | BODY MASS INDEX: 34.39 KG/M2 | SYSTOLIC BLOOD PRESSURE: 132 MMHG | DIASTOLIC BLOOD PRESSURE: 82 MMHG | HEIGHT: 74 IN

## 2023-01-11 DIAGNOSIS — Z85.038 ENCOUNTER FOR FOLLOW-UP SURVEILLANCE OF COLON CANCER: ICD-10-CM

## 2023-01-11 DIAGNOSIS — Z08 ENCOUNTER FOR FOLLOW-UP SURVEILLANCE OF COLON CANCER: Primary | ICD-10-CM

## 2023-01-11 DIAGNOSIS — Z85.038 ENCOUNTER FOR FOLLOW-UP SURVEILLANCE OF COLON CANCER: Primary | ICD-10-CM

## 2023-01-11 DIAGNOSIS — Z08 ENCOUNTER FOR FOLLOW-UP SURVEILLANCE OF COLON CANCER: ICD-10-CM

## 2023-01-11 LAB — CEA SERPL-MCNC: <1.7 NG/ML (ref 0–5)

## 2023-01-11 PROCEDURE — 3008F PR BODY MASS INDEX (BMI) DOCUMENTED: ICD-10-PCS | Mod: CPTII,S$GLB,, | Performed by: COLON & RECTAL SURGERY

## 2023-01-11 PROCEDURE — 3079F DIAST BP 80-89 MM HG: CPT | Mod: CPTII,S$GLB,, | Performed by: COLON & RECTAL SURGERY

## 2023-01-11 PROCEDURE — 99999 PR PBB SHADOW E&M-EST. PATIENT-LVL III: CPT | Mod: PBBFAC,,, | Performed by: COLON & RECTAL SURGERY

## 2023-01-11 PROCEDURE — 3008F BODY MASS INDEX DOCD: CPT | Mod: CPTII,S$GLB,, | Performed by: COLON & RECTAL SURGERY

## 2023-01-11 PROCEDURE — 99999 PR PBB SHADOW E&M-EST. PATIENT-LVL III: ICD-10-PCS | Mod: PBBFAC,,, | Performed by: COLON & RECTAL SURGERY

## 2023-01-11 PROCEDURE — 3075F PR MOST RECENT SYSTOLIC BLOOD PRESS GE 130-139MM HG: ICD-10-PCS | Mod: CPTII,S$GLB,, | Performed by: COLON & RECTAL SURGERY

## 2023-01-11 PROCEDURE — 1159F MED LIST DOCD IN RCRD: CPT | Mod: CPTII,S$GLB,, | Performed by: COLON & RECTAL SURGERY

## 2023-01-11 PROCEDURE — 1159F PR MEDICATION LIST DOCUMENTED IN MEDICAL RECORD: ICD-10-PCS | Mod: CPTII,S$GLB,, | Performed by: COLON & RECTAL SURGERY

## 2023-01-11 PROCEDURE — 3079F PR MOST RECENT DIASTOLIC BLOOD PRESSURE 80-89 MM HG: ICD-10-PCS | Mod: CPTII,S$GLB,, | Performed by: COLON & RECTAL SURGERY

## 2023-01-11 PROCEDURE — 82378 CARCINOEMBRYONIC ANTIGEN: CPT | Performed by: COLON & RECTAL SURGERY

## 2023-01-11 PROCEDURE — 99214 PR OFFICE/OUTPT VISIT, EST, LEVL IV, 30-39 MIN: ICD-10-PCS | Mod: S$GLB,,, | Performed by: COLON & RECTAL SURGERY

## 2023-01-11 PROCEDURE — 99214 OFFICE O/P EST MOD 30 MIN: CPT | Mod: S$GLB,,, | Performed by: COLON & RECTAL SURGERY

## 2023-01-11 PROCEDURE — 36415 COLL VENOUS BLD VENIPUNCTURE: CPT | Performed by: COLON & RECTAL SURGERY

## 2023-01-11 PROCEDURE — 3075F SYST BP GE 130 - 139MM HG: CPT | Mod: CPTII,S$GLB,, | Performed by: COLON & RECTAL SURGERY

## 2023-01-11 NOTE — PROGRESS NOTES
Jim Brown is a 52 y.o. male with history of upper rectal giant villous tumor      11-  resected piecemeal by colonoscopy  Findings:        The perianal and digital rectal examinations were normal.        A greater than 50 mm polyp was found in the sigmoid colon. The polyp        was semi-sessile. The polyp was removed with a piecemeal technique        using a hot snare. Resection and retrieval were complete.        Verification of patient identification for the specimen was done.        Estimated blood loss was minimal.        The terminal ileum appeared normal.     Pathology:    RECTUM, BIOPSY:  -Moderately differentiated adenocarcinoma arising within a tubulovillous adenoma with high-grade  dysplasia, see microscopic and comment.  COMMENT:  This case was also reviewed by Dr. Randi Allen, GI Path in our [place] division, who concurs with  the above diagnosis. A complete copy of her report is attached.  Tatyana Gardner M.D.  Report Attached  Performing site:  [place] [address] [address]    1-  Lap LAR      Pathology    Collected: 01/13/22 1259   Result status: Final   Resulting lab: [facility]   Value: 1. SIGMOID COLON AND RECTUM, LOW ANTERIOR RESECTION:   - No evidence of residual adenoma or malignancy, pT0   - Tattoo site (entirely submitted for histopathologic evaluation)   - Sixteen lymph nodes, negative for metastatic disease (0/16), pN0   - Background colorectal mucosa exhibiting reactive epithelial changes and   diverticular disease associated with chronic inflammation   - See comments for synoptic report   2. DONOT, PROXIMAL, EXCISION:   - Segment of colonic tissue with no significant histopathologic abnormality   - No evidence of dysplasia or malignancy   3. DONUT, DISTAL, EXCISION:   - Segment of colorectal tissue with no significant histopathologic abnormality   - No evidence of dysplasia or malignancy    Comment: Interp By Jay Castillo M.D.,  Signed on 01/21/2022 at 17:16           05/11/2022   Feels well.  No pain.  Numb over hypogastrium.  BMs 2 per day, no blood  Appetite and energy levels good.  Voiding and sex function nl.       8/24/2022 - Interval history  Feels well overall, having 1-2BM/day. Denies straining or constipation. No blood per rectum. Eating well      10-  colonoscopy  Findings:        The perianal and digital rectal examinations were normal.        There was evidence of a prior end-to-end colo-rectal anastomosis in        the proximal rectum. This was patent and was characterized by        healthy appearing mucosa and an intact appearance. The anastomosis        was traversed.        Two semi-sessile polyps were found in the distal transverse colon.        The polyps were 2 to 4 mm in size. These polyps were removed with a        cold snare. Resection and retrieval were complete. Verification of        patient identification for the specimen was done. Estimated blood        loss was minimal.        The terminal ileum appeared normal.        The retroflexed view of the distal rectum and anal verge was normal        and showed no anal or rectal abnormalities.       Final Pathologic Diagnosis Transverse colon, biopsy:       - Fragments of tubular adenoma(s)        1-  Interval hx:  follow up stage I rectosigmoid cancer.    Feels well.  No abd pain.   BMs nl no blood  Appetite and energy levels good.    Activity level good.      Past Medical History:   Diagnosis Date    Hyperlipidemia 11/14/2014 11/13/2014:     Hypertension         Past Surgical History:   Procedure Laterality Date    COLON SURGERY      COLONOSCOPY  08/10/2015    COLONOSCOPY N/A 11/23/2021    Procedure: COLONOSCOPY;  Surgeon: GM Webber MD;  Location: [address];  Service: Endoscopy;  Laterality: N/A;  Covid test 11/23 Casey, yulisa emailed to [email or web address]    COLONOSCOPY N/A 10/11/2022    Procedure:  COLONOSCOPY;  Surgeon: GM Webber MD;  Location: [facility] ENDO (4TH FLR);  Service: Endoscopy;  Laterality: N/A;  Rapid, instr. via mail & portal, clears 4 hrs prior, am prep -PC    FLEXIBLE SIGMOIDOSCOPY  01/13/2022    Procedure: SIGMOIDOSCOPY, FLEXIBLE;  Surgeon: GM Webber MD;  Location: [facility] OR 2ND FLR;  Service: Colon and Rectal;;    MOBILIZATION OF SPLENIC FLEXURE  01/13/2022    Procedure: MOBILIZATION, SPLENIC FLEXURE;  Surgeon: GM Webber MD;  Location: [facility] OR 2ND FLR;  Service: Colon and Rectal;;    ROBOT-ASSISTED LOW ANTERIOR RESECTION OF COLON  01/13/2022    Procedure: ROBOTIC RESECTION, COLON, LOW ANTERIOR;  Surgeon: GM Webber MD;  Location: [facility] OR 2ND FLR;  Service: Colon and Rectal;;    TONSILLECTOMY         Review of patient's allergies indicates:   Allergen Reactions    Lisinopril Other (See Comments)     cough       Family History   Problem Relation Age of Onset    Dementia Mother     Cancer Maternal Grandfather     Coronary artery disease Maternal Grandfather     Heart attack Maternal Grandfather 55    Cancer Paternal Grandmother         colon cancer       Social History     Socioeconomic History    Marital status:    Tobacco Use    Smoking status: Never    Smokeless tobacco: Never   Substance and Sexual Activity    Alcohol use: Yes     Alcohol/week: 3.0 standard drinks     Types: 3 Drinks containing 0.5 oz of alcohol per week     Comment: 2-3 on weekends    Drug use: Never    Sexual activity: Yes     Partners: Female       ROS:  GENERAL: No fever, chills, fatigability or weight loss.  Integument: No rashes, redness, icterus  CHEST: Denies MARIE, cyanosis, wheezing, cough and sputum production.  CARDIOVASCULAR: Denies chest pain, PND, orthopnea or reduced exercise tolerance.  GI: Denies abd pain, dysphagia, nausea, vomiting, no hematemesis   : Denies burning on urination, no hematuria, no bacteriuria  MSK: No deformities, swelling, joint pain swelling  Neurologic: No  "HAs, seizures, weakness, paresthesias, gait problems    PE:  General appearance well  /82 (BP Location: Left arm, Patient Position: Sitting, BP Method: Large (Automatic))   Pulse 81   Ht 6' 2" (1.88 m)   Wt 121.6 kg (268 lb)   BMI 34.41 kg/m²   Sclera/ Skin anicteric  LN none palpable  AT NC EOMI  Neck supple trachea midline   Chest symmetric, nl excursion, no retractions, breathing comfortably  Abdomen      ND soft NT.  no masses, no organomegaly  EXT - no CCE  Neuro:  Mood/ affect nl, alert and oriented x 3, moves all ext's, gait nl      Assessment:  KATLYN  Good performance status    Plan:  Check yearly CT scan  CEA  OV 3 months    "

## 2023-01-12 ENCOUNTER — OFFICE VISIT (OUTPATIENT)
Dept: URGENT CARE | Facility: CLINIC | Age: 53
End: 2023-01-12
Payer: COMMERCIAL

## 2023-01-12 ENCOUNTER — PATIENT MESSAGE (OUTPATIENT)
Dept: PRIMARY CARE CLINIC | Facility: CLINIC | Age: 53
End: 2023-01-12
Payer: COMMERCIAL

## 2023-01-12 VITALS
BODY MASS INDEX: 34.39 KG/M2 | TEMPERATURE: 99 F | OXYGEN SATURATION: 96 % | HEART RATE: 82 BPM | RESPIRATION RATE: 18 BRPM | HEIGHT: 74 IN | WEIGHT: 268 LBS | SYSTOLIC BLOOD PRESSURE: 146 MMHG | DIASTOLIC BLOOD PRESSURE: 92 MMHG

## 2023-01-12 DIAGNOSIS — R05.8 PRODUCTIVE COUGH: Primary | ICD-10-CM

## 2023-01-12 DIAGNOSIS — R50.9 FEVER, UNSPECIFIED FEVER CAUSE: ICD-10-CM

## 2023-01-12 DIAGNOSIS — J20.8 ACUTE BACTERIAL BRONCHITIS: ICD-10-CM

## 2023-01-12 DIAGNOSIS — B96.89 ACUTE BACTERIAL BRONCHITIS: ICD-10-CM

## 2023-01-12 LAB
CTP QC/QA: YES
CTP QC/QA: YES
POC MOLECULAR INFLUENZA A AGN: NEGATIVE
POC MOLECULAR INFLUENZA B AGN: NEGATIVE
SARS-COV-2 AG RESP QL IA.RAPID: NEGATIVE

## 2023-01-12 PROCEDURE — 87811 SARS CORONAVIRUS 2 ANTIGEN POCT, MANUAL READ: ICD-10-PCS | Mod: QW,S$GLB,, | Performed by: INTERNAL MEDICINE

## 2023-01-12 PROCEDURE — 71046 X-RAY EXAM CHEST 2 VIEWS: CPT | Mod: FY,S$GLB,, | Performed by: RADIOLOGY

## 2023-01-12 PROCEDURE — 94640 AIRWAY INHALATION TREATMENT: CPT | Mod: S$GLB,,, | Performed by: INTERNAL MEDICINE

## 2023-01-12 PROCEDURE — 94640 PR INHAL RX, AIRWAY OBST/DX SPUTUM INDUCT: ICD-10-PCS | Mod: S$GLB,,, | Performed by: INTERNAL MEDICINE

## 2023-01-12 PROCEDURE — 3008F PR BODY MASS INDEX (BMI) DOCUMENTED: ICD-10-PCS | Mod: CPTII,S$GLB,, | Performed by: INTERNAL MEDICINE

## 2023-01-12 PROCEDURE — 71046 XR CHEST PA AND LATERAL: ICD-10-PCS | Mod: FY,S$GLB,, | Performed by: RADIOLOGY

## 2023-01-12 PROCEDURE — 87502 INFLUENZA DNA AMP PROBE: CPT | Mod: QW,S$GLB,, | Performed by: INTERNAL MEDICINE

## 2023-01-12 PROCEDURE — 99215 OFFICE O/P EST HI 40 MIN: CPT | Mod: 25,S$GLB,, | Performed by: INTERNAL MEDICINE

## 2023-01-12 PROCEDURE — 3008F BODY MASS INDEX DOCD: CPT | Mod: CPTII,S$GLB,, | Performed by: INTERNAL MEDICINE

## 2023-01-12 PROCEDURE — 87811 SARS-COV-2 COVID19 W/OPTIC: CPT | Mod: QW,S$GLB,, | Performed by: INTERNAL MEDICINE

## 2023-01-12 PROCEDURE — 3077F SYST BP >= 140 MM HG: CPT | Mod: CPTII,S$GLB,, | Performed by: INTERNAL MEDICINE

## 2023-01-12 PROCEDURE — 3077F PR MOST RECENT SYSTOLIC BLOOD PRESSURE >= 140 MM HG: ICD-10-PCS | Mod: CPTII,S$GLB,, | Performed by: INTERNAL MEDICINE

## 2023-01-12 PROCEDURE — 99215 PR OFFICE/OUTPT VISIT, EST, LEVL V, 40-54 MIN: ICD-10-PCS | Mod: 25,S$GLB,, | Performed by: INTERNAL MEDICINE

## 2023-01-12 PROCEDURE — 3080F PR MOST RECENT DIASTOLIC BLOOD PRESSURE >= 90 MM HG: ICD-10-PCS | Mod: CPTII,S$GLB,, | Performed by: INTERNAL MEDICINE

## 2023-01-12 PROCEDURE — 87502 POCT INFLUENZA A/B MOLECULAR: ICD-10-PCS | Mod: QW,S$GLB,, | Performed by: INTERNAL MEDICINE

## 2023-01-12 PROCEDURE — 3080F DIAST BP >= 90 MM HG: CPT | Mod: CPTII,S$GLB,, | Performed by: INTERNAL MEDICINE

## 2023-01-12 PROCEDURE — 1159F MED LIST DOCD IN RCRD: CPT | Mod: CPTII,S$GLB,, | Performed by: INTERNAL MEDICINE

## 2023-01-12 PROCEDURE — 1159F PR MEDICATION LIST DOCUMENTED IN MEDICAL RECORD: ICD-10-PCS | Mod: CPTII,S$GLB,, | Performed by: INTERNAL MEDICINE

## 2023-01-12 RX ORDER — IPRATROPIUM BROMIDE 0.5 MG/2.5ML
0.5 SOLUTION RESPIRATORY (INHALATION)
Status: COMPLETED | OUTPATIENT
Start: 2023-01-12 | End: 2023-01-12

## 2023-01-12 RX ORDER — ALBUTEROL SULFATE 90 UG/1
2 AEROSOL, METERED RESPIRATORY (INHALATION) EVERY 6 HOURS PRN
Qty: 18 G | Refills: 0 | Status: SHIPPED | OUTPATIENT
Start: 2023-01-12 | End: 2023-09-25 | Stop reason: SDUPTHER

## 2023-01-12 RX ORDER — DOXYCYCLINE HYCLATE 100 MG
100 TABLET ORAL 2 TIMES DAILY
Qty: 10 TABLET | Refills: 0 | Status: SHIPPED | OUTPATIENT
Start: 2023-01-12 | End: 2023-01-17

## 2023-01-12 RX ORDER — ALBUTEROL SULFATE 0.83 MG/ML
2.5 SOLUTION RESPIRATORY (INHALATION)
Status: COMPLETED | OUTPATIENT
Start: 2023-01-12 | End: 2023-01-12

## 2023-01-12 RX ORDER — PROMETHAZINE HYDROCHLORIDE AND DEXTROMETHORPHAN HYDROBROMIDE 6.25; 15 MG/5ML; MG/5ML
5 SYRUP ORAL EVERY 4 HOURS PRN
Qty: 180 ML | Refills: 0 | Status: SHIPPED | OUTPATIENT
Start: 2023-01-12 | End: 2023-01-22

## 2023-01-12 RX ORDER — PREDNISONE 20 MG/1
40 TABLET ORAL DAILY
Qty: 6 TABLET | Refills: 0 | Status: SHIPPED | OUTPATIENT
Start: 2023-01-12 | End: 2023-01-15

## 2023-01-12 RX ORDER — BENZONATATE 100 MG/1
100 CAPSULE ORAL 3 TIMES DAILY PRN
Qty: 30 CAPSULE | Refills: 0 | Status: SHIPPED | OUTPATIENT
Start: 2023-01-12 | End: 2023-01-22

## 2023-01-12 RX ADMIN — IPRATROPIUM BROMIDE 0.5 MG: 0.5 SOLUTION RESPIRATORY (INHALATION) at 09:01

## 2023-01-12 RX ADMIN — ALBUTEROL SULFATE 2.5 MG: 0.83 SOLUTION RESPIRATORY (INHALATION) at 09:01

## 2023-01-12 NOTE — PROGRESS NOTES
"Subjective:       Patient ID: Jim Brown is a 52 y.o. male.    Vitals:  height is 6' 2" (1.88 m) and weight is 121.6 kg (268 lb).     Chief Complaint: URI    This is a 52 y.o. male who presents today with a chief complaint of URI . Patient's symptoms started on Monday with a cough. Patient started to cough up yellow mucus on Tuesday. Fever started on yesterday. Highest temp was 102.2. Patient stated that he has chest congestion and has to sleep upright. Last dose on Tylenol was 7:00 a.m.      URI   This is a new problem. The current episode started in the past 7 days. The problem has been gradually worsening. The maximum temperature recorded prior to his arrival was 102 - 102.9 F. The fever has been present for 1 to 2 days. Associated symptoms include congestion, coughing and headaches. He has tried acetaminophen and NSAIDs (mucinex Dm , Tylenol and Motrin) for the symptoms. The treatment provided mild relief.     HENT:  Positive for congestion.    Respiratory:  Positive for cough.    Skin:  Negative for erythema.   Neurological:  Positive for headaches.     Objective:      Physical Exam   Constitutional: He is oriented to person, place, and time. He appears well-developed. No distress.   HENT:   Head: Normocephalic and atraumatic.   Ears:   Right Ear: External ear normal.   Left Ear: External ear normal.   Nose: Nose normal.   Mouth/Throat: Oropharynx is clear and moist. No oropharyngeal exudate.   Eyes: Conjunctivae and EOM are normal. Pupils are equal, round, and reactive to light. Right eye exhibits no discharge. Left eye exhibits no discharge. No scleral icterus.   Neck: Neck supple.   Cardiovascular: Normal rate, regular rhythm and normal heart sounds.   No murmur heard.Exam reveals no gallop and no friction rub.   Pulmonary/Chest: Effort normal and breath sounds normal. No stridor. No respiratory distress. He has no wheezes. He has no rhonchi. He has no rales.   Abdominal: Bowel sounds are normal. He " exhibits no distension. Soft.   Lymphadenopathy:     He has no cervical adenopathy.   Neurological: He is alert and oriented to person, place, and time.   Skin: Skin is warm, dry, not diaphoretic and no rash. Capillary refill takes less than 2 seconds. No erythema   Psychiatric: His behavior is normal.   Nursing note and vitals reviewed.        Results for orders placed or performed in visit on 01/12/23   POCT Influenza A/B MOLECULAR   Result Value Ref Range    POC Molecular Influenza A Ag Negative Negative, Not Reported    POC Molecular Influenza B Ag Negative Negative, Not Reported     Acceptable Yes    SARS Coronavirus 2 Antigen, POCT Manual Read   Result Value Ref Range    SARS Coronavirus 2 Antigen Negative Negative     Acceptable Yes        XR CHEST PA AND LATERAL    Result Date: 1/12/2023  EXAMINATION: XR CHEST PA AND LATERAL CLINICAL HISTORY: Other specified cough TECHNIQUE: PA and lateral views of the chest were performed. COMPARISON: 02/01/2021 FINDINGS: The lungs are clear, with normal appearance of pulmonary vasculature and no pleural effusion or pneumothorax. The cardiac silhouette is normal in size. The hilar and mediastinal contours are unremarkable. Visualized osseous structures show no acute abnormalities.     No acute radiographic findings in the chest. Electronically signed by: Vazquez Amador MD Date:    01/12/2023 Time:    10:17      Assessment:       1. Productive cough    2. Fever, unspecified fever cause    3. Acute bacterial bronchitis          Plan:         Productive cough  -     SARS Coronavirus 2 Antigen, POCT Manual Read  -     XR CHEST PA AND LATERAL; Future; Expected date: 01/12/2023  -     albuterol nebulizer solution 2.5 mg  -     ipratropium 0.02 % nebulizer solution 0.5 mg    Fever, unspecified fever cause  -     POCT Influenza A/B MOLECULAR    Acute bacterial bronchitis  -     doxycycline (VIBRA-TABS) 100 MG tablet; Take 1 tablet (100 mg total) by  mouth 2 (two) times daily. for 5 days  Dispense: 10 tablet; Refill: 0  -     predniSONE (DELTASONE) 20 MG tablet; Take 2 tablets (40 mg total) by mouth once daily. for 3 days  Dispense: 6 tablet; Refill: 0  -     promethazine-dextromethorphan (PROMETHAZINE-DM) 6.25-15 mg/5 mL Syrp; Take 5 mLs by mouth every 4 (four) hours as needed.  Dispense: 180 mL; Refill: 0  -     benzonatate (TESSALON) 100 MG capsule; Take 1 capsule (100 mg total) by mouth 3 (three) times daily as needed for Cough.  Dispense: 30 capsule; Refill: 0  -     albuterol (PROVENTIL HFA) 90 mcg/actuation inhaler; Inhale 2 puffs into the lungs every 6 (six) hours as needed for Wheezing. Rescue  Dispense: 18 g; Refill: 0    Improved symptoms with nebulizers in clinic. Tx as above. Does not warrant inpatient mgmt at this time.

## 2023-01-12 NOTE — PROGRESS NOTES
"Subjective:       Patient ID: Jim Brown is a 52 y.o. male.    Vitals:  height is 6' 2" (1.88 m) and weight is 121.6 kg (268 lb).     Chief Complaint: No chief complaint on file.    This is a 52 y.o. male who presents today with a chief complaint of     ROS    Objective:      Physical Exam      Assessment:       No diagnosis found.      Plan:         There are no diagnoses linked to this encounter.                 "

## 2023-01-13 NOTE — PROGRESS NOTES
"Subjective:       Patient ID: Jim Brown is a 52 y.o. male.    Vitals:  height is 6' 2" (1.88 m) and weight is 121.6 kg (268 lb). His oral temperature is 98.9 °F (37.2 °C). His blood pressure is 146/92 (abnormal) and his pulse is 82. His respiration is 18 and oxygen saturation is 96%.     Chief Complaint: URI    This is a 52 y.o. male who presents today with a chief complaint of       URI   ROS    Objective:      Physical Exam      Assessment:       1. Productive cough    2. Fever, unspecified fever cause    3. Acute bacterial bronchitis          Plan:         Productive cough  -     SARS Coronavirus 2 Antigen, POCT Manual Read  -     XR CHEST PA AND LATERAL; Future; Expected date: 01/12/2023  -     albuterol nebulizer solution 2.5 mg  -     ipratropium 0.02 % nebulizer solution 0.5 mg    Fever, unspecified fever cause  -     POCT Influenza A/B MOLECULAR    Acute bacterial bronchitis  -     doxycycline (VIBRA-TABS) 100 MG tablet; Take 1 tablet (100 mg total) by mouth 2 (two) times daily. for 5 days  Dispense: 10 tablet; Refill: 0  -     predniSONE (DELTASONE) 20 MG tablet; Take 2 tablets (40 mg total) by mouth once daily. for 3 days  Dispense: 6 tablet; Refill: 0  -     promethazine-dextromethorphan (PROMETHAZINE-DM) 6.25-15 mg/5 mL Syrp; Take 5 mLs by mouth every 4 (four) hours as needed.  Dispense: 180 mL; Refill: 0  -     benzonatate (TESSALON) 100 MG capsule; Take 1 capsule (100 mg total) by mouth 3 (three) times daily as needed for Cough.  Dispense: 30 capsule; Refill: 0  -     albuterol (PROVENTIL HFA) 90 mcg/actuation inhaler; Inhale 2 puffs into the lungs every 6 (six) hours as needed for Wheezing. Rescue  Dispense: 18 g; Refill: 0                 "

## 2023-01-19 ENCOUNTER — PATIENT MESSAGE (OUTPATIENT)
Dept: PRIMARY CARE CLINIC | Facility: CLINIC | Age: 53
End: 2023-01-19
Payer: COMMERCIAL

## 2023-01-20 ENCOUNTER — OFFICE VISIT (OUTPATIENT)
Dept: PRIMARY CARE CLINIC | Facility: CLINIC | Age: 53
End: 2023-01-20
Payer: COMMERCIAL

## 2023-01-20 VITALS
HEIGHT: 74 IN | HEART RATE: 67 BPM | SYSTOLIC BLOOD PRESSURE: 140 MMHG | BODY MASS INDEX: 34.12 KG/M2 | TEMPERATURE: 98 F | OXYGEN SATURATION: 98 % | DIASTOLIC BLOOD PRESSURE: 80 MMHG | WEIGHT: 265.88 LBS

## 2023-01-20 DIAGNOSIS — J20.9 ACUTE BRONCHITIS, UNSPECIFIED ORGANISM: Primary | ICD-10-CM

## 2023-01-20 PROCEDURE — 3079F DIAST BP 80-89 MM HG: CPT | Mod: CPTII,S$GLB,, | Performed by: STUDENT IN AN ORGANIZED HEALTH CARE EDUCATION/TRAINING PROGRAM

## 2023-01-20 PROCEDURE — 3008F PR BODY MASS INDEX (BMI) DOCUMENTED: ICD-10-PCS | Mod: CPTII,S$GLB,, | Performed by: STUDENT IN AN ORGANIZED HEALTH CARE EDUCATION/TRAINING PROGRAM

## 2023-01-20 PROCEDURE — 99213 PR OFFICE/OUTPT VISIT, EST, LEVL III, 20-29 MIN: ICD-10-PCS | Mod: S$GLB,,, | Performed by: STUDENT IN AN ORGANIZED HEALTH CARE EDUCATION/TRAINING PROGRAM

## 2023-01-20 PROCEDURE — 99999 PR PBB SHADOW E&M-EST. PATIENT-LVL V: CPT | Mod: PBBFAC,,, | Performed by: STUDENT IN AN ORGANIZED HEALTH CARE EDUCATION/TRAINING PROGRAM

## 2023-01-20 PROCEDURE — 3077F SYST BP >= 140 MM HG: CPT | Mod: CPTII,S$GLB,, | Performed by: STUDENT IN AN ORGANIZED HEALTH CARE EDUCATION/TRAINING PROGRAM

## 2023-01-20 PROCEDURE — 1159F PR MEDICATION LIST DOCUMENTED IN MEDICAL RECORD: ICD-10-PCS | Mod: CPTII,S$GLB,, | Performed by: STUDENT IN AN ORGANIZED HEALTH CARE EDUCATION/TRAINING PROGRAM

## 2023-01-20 PROCEDURE — 1159F MED LIST DOCD IN RCRD: CPT | Mod: CPTII,S$GLB,, | Performed by: STUDENT IN AN ORGANIZED HEALTH CARE EDUCATION/TRAINING PROGRAM

## 2023-01-20 PROCEDURE — 99213 OFFICE O/P EST LOW 20 MIN: CPT | Mod: S$GLB,,, | Performed by: STUDENT IN AN ORGANIZED HEALTH CARE EDUCATION/TRAINING PROGRAM

## 2023-01-20 PROCEDURE — 3077F PR MOST RECENT SYSTOLIC BLOOD PRESSURE >= 140 MM HG: ICD-10-PCS | Mod: CPTII,S$GLB,, | Performed by: STUDENT IN AN ORGANIZED HEALTH CARE EDUCATION/TRAINING PROGRAM

## 2023-01-20 PROCEDURE — 99999 PR PBB SHADOW E&M-EST. PATIENT-LVL V: ICD-10-PCS | Mod: PBBFAC,,, | Performed by: STUDENT IN AN ORGANIZED HEALTH CARE EDUCATION/TRAINING PROGRAM

## 2023-01-20 PROCEDURE — 3008F BODY MASS INDEX DOCD: CPT | Mod: CPTII,S$GLB,, | Performed by: STUDENT IN AN ORGANIZED HEALTH CARE EDUCATION/TRAINING PROGRAM

## 2023-01-20 PROCEDURE — 3079F PR MOST RECENT DIASTOLIC BLOOD PRESSURE 80-89 MM HG: ICD-10-PCS | Mod: CPTII,S$GLB,, | Performed by: STUDENT IN AN ORGANIZED HEALTH CARE EDUCATION/TRAINING PROGRAM

## 2023-01-20 NOTE — PROGRESS NOTES
Primary Care  Return/Acute Office Visit - In Person  1/20/2023  Mwwilliamwe Ndhlovu      [facility]    Patient is a 52 y.o.   Jim Brown  has a past medical history of Hyperlipidemia (11/14/2014) and Hypertension.    Patient presents with   Chief Complaint   Patient presents with    Cough     COVID//FLU TESTED NEGATIVE, FROM URGENT CARE VISIT    Wheezing    Chest Congestion     mild     Patient reports that about 10 days ago he developed URI symptoms and fever. COVID and influenza tests have been negative. CXR unremarkable. Patient denies any sore throat. He reports cough productive of yellow sputum. He denies any shortness of breath. He does feel easily winded on exertion.   Patient lifetime non smoker.       Social History     Social History Narrative    Not on file     Jim Brown family history includes Cancer in his maternal grandfather and paternal grandmother; Coronary artery disease in his maternal grandfather; Dementia in his mother; Heart attack (age of onset: 55) in his maternal grandfather.    Active Medications:  Review of patient's allergies indicates:   Allergen Reactions    Lisinopril Other (See Comments)     cough     Current Outpatient Medications   Medication Instructions    albuterol (PROVENTIL HFA) 90 mcg/actuation inhaler 2 puffs, Inhalation, Every 6 hours PRN, Rescue    allopurinoL (ZYLOPRIM) 300 mg, Oral, Daily    ALPRAZolam (XANAX) 0.25 mg, Oral, 3 times daily PRN    ascorbic acid (vitamin C) (VITAMIN C) 500 mg, Oral, 2 times daily    benzonatate (TESSALON) 100 mg, Oral, 3 times daily PRN    losartan (COZAAR) 50 MG tablet TAKE 1 TABLET(50 MG) BY MOUTH EVERY DAY    meloxicam (MOBIC) 15 mg, Oral, Daily    multivitamin Tab 1 tablet, Oral, Daily    promethazine-dextromethorphan (PROMETHAZINE-DM) 6.25-15 mg/5 mL Syrp 5 mLs, Oral, Every 4 hours PRN    sildenafiL (VIAGRA) 100 mg, Oral, Daily PRN    sod sulf-pot chloride-mag sulf (SUTAB) 1.479-0.188- 0.225 gram tablet 12 tablets, Oral, Daily,  "Take according to package instructions with indicated amount of water.    tamsulosin (FLOMAX) 0.4 mg Cap TAKE 1 CAPSULE(0.4 MG) BY MOUTH EVERY DAY    tamsulosin (FLOMAX) 0.4 mg, Oral, Daily       Review of Systems   Respiratory:  Positive for cough and sputum production.      Vitals:    01/20/23 0936   BP: (!) 140/80   BP Location: Right arm   Pulse: 67   Temp: 97.7 °F (36.5 °C)   SpO2: 98%   Weight: 120.6 kg (265 lb 14 oz)   Height: 6' 2" (1.88 m)       Physical Exam  Vitals reviewed.   Constitutional:       General: He is not in acute distress.  HENT:      Right Ear: Tympanic membrane normal.      Left Ear: Tympanic membrane normal.      Nose:      Right Sinus: No maxillary sinus tenderness or frontal sinus tenderness.      Left Sinus: No maxillary sinus tenderness or frontal sinus tenderness.      Mouth/Throat:      Pharynx: Oropharynx is clear. No pharyngeal swelling or posterior oropharyngeal erythema.   Cardiovascular:      Rate and Rhythm: Normal rate and regular rhythm.      Pulses: Normal pulses.      Heart sounds: Normal heart sounds.   Pulmonary:      Effort: Pulmonary effort is normal.      Breath sounds: Normal breath sounds.   Abdominal:      Palpations: Abdomen is soft.   Musculoskeletal:      Right lower leg: No edema.      Left lower leg: No edema.   Neurological:      General: No focal deficit present.      Mental Status: He is oriented to person, place, and time.        Assessment and Plan     URI symptoms  COVID and influenza negative   Likely self limiting viral illness. Patient non toxic appearing and all vitals within normal range. He has completed course of steroids and abx.   Recommended symptom management with guaifenesin and tylenol   Patient to seek medical care if developing shortness of breath           Upcoming Scheduled Appointments and Follow Up:    Future Appointments   Date Time Provider Department Center   2/1/2023 12:00 PM [facility]IC-CT2 500 LB LIMIT [facility] "   3/20/2023 10:00 AM Viviana Bee MD [facility] BRIAN Elder   4/12/2023  9:20 AM GM Webber MD [facility] Conrad mauro       Follow Up [facility] Care (with who? when?): Follow up if symptoms worsen or fail to improve.      Extended Emergency Contact Information  Primary Emergency Contact: Denice Brown   [facility]  Home Phone: [phone]  Relation: Spouse      Marv Atkins MD   Attending Physician  Primary Care  1/20/2023 - 9:50 AM    I spent a total of 12 minutes on the day of the visit.This includes face to face time and non-face to face time preparing to see the patient (eg, review of tests), obtaining and/or reviewing separately obtained history, documenting clinical information in the electronic or other health record, independently interpreting results and communicating results to the patient/family/caregiver, or care coordinator.

## 2023-02-01 ENCOUNTER — HOSPITAL ENCOUNTER (OUTPATIENT)
Dept: RADIOLOGY | Facility: HOSPITAL | Age: 53
Discharge: HOME | End: 2023-02-01
Attending: COLON & RECTAL SURGERY
Payer: COMMERCIAL

## 2023-02-01 DIAGNOSIS — Z08 ENCOUNTER FOR FOLLOW-UP SURVEILLANCE OF COLON CANCER: ICD-10-CM

## 2023-02-01 DIAGNOSIS — Z85.038 ENCOUNTER FOR FOLLOW-UP SURVEILLANCE OF COLON CANCER: ICD-10-CM

## 2023-02-01 PROCEDURE — 71260 CT THORAX DX C+: CPT | Mod: 26,,, | Performed by: INTERNAL MEDICINE

## 2023-02-01 PROCEDURE — 71260 CT THORAX DX C+: CPT | Mod: TC

## 2023-02-01 PROCEDURE — 25500020 PHARM REV CODE 255: Performed by: COLON & RECTAL SURGERY

## 2023-02-01 PROCEDURE — 74177 CT ABD & PELVIS W/CONTRAST: CPT | Mod: TC

## 2023-02-01 PROCEDURE — 74177 CT CHEST ABDOMEN PELVIS WITH CONTRAST (XPD): ICD-10-PCS | Mod: 26,,, | Performed by: INTERNAL MEDICINE

## 2023-02-01 PROCEDURE — A9698 NON-RAD CONTRAST MATERIALNOC: HCPCS | Performed by: COLON & RECTAL SURGERY

## 2023-02-01 PROCEDURE — 74177 CT ABD & PELVIS W/CONTRAST: CPT | Mod: 26,,, | Performed by: INTERNAL MEDICINE

## 2023-02-01 PROCEDURE — 71260 CT CHEST ABDOMEN PELVIS WITH CONTRAST (XPD): ICD-10-PCS | Mod: 26,,, | Performed by: INTERNAL MEDICINE

## 2023-02-01 RX ADMIN — Medication 450 ML: at 12:02

## 2023-02-01 RX ADMIN — IOHEXOL 100 ML: 350 INJECTION, SOLUTION INTRAVENOUS at 12:02

## 2023-03-19 NOTE — PROGRESS NOTES
[facility] Note    Chief Complaint      Chief Complaint   Patient presents with    Annual Exam       History of Present Illness      Jim Brown is a 53 y.o. M with colon Ca, BPH, Gout, HTN, Anxiety, Male ED presents to fu chronic issues. Last visit - 9/19/22    Borderline HLD - Cholesterol looked slightly high. He does not require medication for this at this time but I do recommend you follow a low cholesterol diet and exercise. Rec he can visit the American heart association website at heart.org for further info on a low cholesterol diet.     Thyroid nodules -  Seen on recent imaging. Will check thyroid U/S     ?Pancreatic cysts -  seen on recent CT C/A/P - 2/1/23. Will refer to GI, , for further eval.     Chronic cough - Since Jan.  Had Acute bronchitis.  Uses inhaler prn. Prod clear and sometimes yellow sputum. No h/o Asthma. +Postnasal drip. Rec Flonase 2 SEN/D and Claritin. Note - Pt is on Losartan.  H/o Lisinopril causing a cough.     Anemia -  H/H - 13.7/43 - stable. Check iron studies, B12, folate.     Inc PSA - PSA was normal at 3.1 but inc from 2.5 -Fu with Dr. Alex BARRAZA.  Planning for repeat in May. MRI Prostate - 12/29/22 - BPH and peripheral zone heterogeneity.  No focal lesion concerning for prostate cancer.    BPH - Fu by Dr. Alex BARRAZA. Pt on Flomax.  Doing ok.  No dizziness.     Thrombocytopenia - 128 - Stable since 2010.      Stage I Colon Ca - Fu by Dr. Webber.  CEA < 1.7  - 1/11/23.  S/p partial colectomy - 1/2022. CT C/A/P - 2/1/23 -Postoperative change related to low anterior resection and splenic flexure mobilization for colon cancer resection.   no evidence of local disease recurrence. Prominent hypoattenuating focus within the head of the pancreas, which may represent small cystic lesion versus normal intra-abdominal fat.No evidence of metastatic disease within the chest, abdomen, or pelvis. Small right thyroid lobe nodule, which may be better evaluated with  ultrasound. Few solid pulmonary nodules, unchanged from CT 12/10/2021. No sig. coronary atherosclerosis.        Gout - Pt Rx Allopurinol 300 mg/d per Podiatry, Dr. Simon.  Has not yet started this.    Had c/o purple, swelling, pain to Left 2nd toe. No trauma. Xray - no sign of Fracture.  Pt has been taking Meloxicam 15 mg/d  prn per Podiatry. Rec caution given HTN, and prev partial colectomy.       HTN - Pt controlled on Losartan 50 mg/d.  Prev had cough on Lisinopril.      Anxiety - Pt on Xanax daily-BID prn. Rarely needs this when stressed and caring for his Mom. Rx when dx with Colon ca. 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 Has been caring for his Mom with dementia.  It gets frustrating. He does not wish to be on Zoloft.  He was on it in the past and felt it affected his sex drive and caused anorgasmia. Could try Buspirone in future.      Male ED - Pt on Viagra prn.     H/o COVID 19 pneumonia - Dec. 2020. Tx with Remdesevir while inpatient back to baseline.      HCM - Flu - none - declines;  Tdap - 11/12/14;  PCV 13 - none;  PVX 23 - none;  Shingrix - none - due - defers;  COVID - 19 Vaccine #1 none -declines; Hep C Screen - 11/2/22 - neg.;  HIV Screen - none - declines; C-scope 10/11/22 - + polyps - repeat 3 yrs;  PSA - 3.1 - 11/2/22; Prev PCP - Dr. Myers; CRS - Dr. Webber;   - Dr. Johnson; Podiatrist - Dr. Simon; Well visit - 11/4/21    Patient Care Team:  Viviana Bee MD as PCP - General (Internal Medicine)  Joanie Mena LPN (Inactive) as Licensed Practical Nurse (Internal Medicine)  Joanie Mena LPN (Inactive) as Care Coordinator (Internal Medicine)  Jose Juan Simon DPM (Podiatry)     Health Maintenance:  Immunization History   Administered Date(s) Administered    Tdap 11/12/2014      Health Maintenance   Topic Date Due    TETANUS VACCINE  11/12/2024    Lipid Panel  11/02/2027    Hepatitis C Screening  Completed        Past Medical History:  Past Medical History:   Diagnosis Date     Hyperlipidemia 11/14/2014 11/13/2014:     Hypertension        Past Surgical History:   has a past surgical history that includes Colonoscopy (08/10/2015); Tonsillectomy; Colonoscopy (N/A, 11/23/2021); Robot-assisted low anterior resection of colon (01/13/2022); Mobilization of splenic flexure (01/13/2022); Flexible sigmoidoscopy (01/13/2022); Colon surgery; and Colonoscopy (N/A, 10/11/2022).    Family History:  family history includes Cancer in his maternal grandfather and paternal grandmother; Coronary artery disease in his maternal grandfather; Dementia in his mother; Heart attack (age of onset: 55) in his maternal grandfather.     Social History:  Social History     Tobacco Use    Smoking status: Never    Smokeless tobacco: Never   Substance Use Topics    Alcohol use: Yes     Alcohol/week: 3.0 standard drinks     Types: 3 Drinks containing 0.5 oz of alcohol per week     Comment: 2-3 on weekends    Drug use: Never       Review of Systems   Constitutional:  Negative for chills, diaphoresis and fever.   HENT:  Positive for hearing loss and postnasal drip.         Hard to hear in restuarants or loud situations.    Eyes:  Negative for visual disturbance.   Respiratory:  Positive for cough and wheezing. Negative for shortness of breath.    Cardiovascular:  Positive for palpitations. Negative for chest pain.        Rarely feels a skipped beat - every few mos.  Lasts a moment - no assoc dizziness or CP.    Gastrointestinal:  Negative for abdominal pain, blood in stool, constipation, diarrhea, nausea and vomiting.   Genitourinary:         No hesitancy and no nocturia.     Neurological:  Negative for dizziness and headaches.   Psychiatric/Behavioral:  Negative for dysphoric mood. The patient is nervous/anxious.       Medications:    Current Outpatient Medications:     albuterol (PROVENTIL HFA) 90 mcg/actuation inhaler, Inhale 2 puffs into the lungs every 6 (six) hours as needed for Wheezing. Rescue, Disp: 18 g,  "Rfl: 0    ALPRAZolam (XANAX) 0.25 MG tablet, Take 1 tablet (0.25 mg total) by mouth 3 (three) times daily as needed (anxiety)., Disp: 60 tablet, Rfl: 0    ascorbic acid, vitamin C, (VITAMIN C) 500 MG tablet, Take 1 tablet (500 mg total) by mouth 2 (two) times daily., Disp:  , Rfl:     losartan (COZAAR) 50 MG tablet, TAKE 1 TABLET(50 MG) BY MOUTH EVERY DAY, Disp: 90 tablet, Rfl: 1    meloxicam (MOBIC) 15 MG tablet, Take 15 mg by mouth once daily., Disp: , Rfl:     multivitamin Tab, Take 1 tablet by mouth once daily., Disp: 30 tablet, Rfl: 11    sildenafiL (VIAGRA) 100 MG tablet, Take 1 tablet (100 mg total) by mouth daily as needed., Disp: 30 tablet, Rfl: 11    tamsulosin (FLOMAX) 0.4 mg Cap, TAKE 1 CAPSULE(0.4 MG) BY MOUTH EVERY DAY, Disp: 30 capsule, Rfl: 12    tamsulosin (FLOMAX) 0.4 mg Cap, Take 1 capsule (0.4 mg total) by mouth once daily., Disp: 30 capsule, Rfl: 12    sod sulf-pot chloride-mag sulf (SUTAB) 1.479-0.188- 0.225 gram tablet, Take 12 tablets by mouth once daily. Take according to package instructions with indicated amount of water. (Patient not taking: Reported on 3/20/2023), Disp: 24 tablet, Rfl: 0  No current facility-administered medications for this visit.    Facility-Administered Medications Ordered in Other Visits:     gabapentin capsule 300 mg, 300 mg, Oral, TID, Didi Claros NP, 300 mg at 01/18/22 0849     Allergies:  Review of patient's allergies indicates:   Allergen Reactions    Lisinopril Other (See Comments)     cough       Physical Exam      Vital Signs  Temp: 98.4 °F (36.9 °C)  Temp Source: Oral  Pulse: 75  Resp: 16  SpO2: 98 %  BP: 126/84  BP Location: Right arm  Patient Position: Sitting  Pain Score:   7  Pain Loc:  (sciatica)  Height and Weight  Height: 6' 2" (188 cm)  Weight: 122.7 kg (270 lb 8.1 oz)  BSA (Calculated - sq m): 2.53 sq meters  BMI (Calculated): 34.7  Weight in (lb) to have BMI = 25: 194.3      Patient Position: Sitting      Physical Exam  Vitals reviewed. "   Constitutional:       General: He is not in acute distress.     Appearance: Normal appearance. He is not ill-appearing, toxic-appearing or diaphoretic.   HENT:      Head: Normocephalic and atraumatic.      Right Ear: Tympanic membrane normal.      Left Ear: Tympanic membrane normal.   Eyes:      Extraocular Movements: Extraocular movements intact.      Conjunctiva/sclera: Conjunctivae normal.      Pupils: Pupils are equal, round, and reactive to light.   Neck:      Vascular: No carotid bruit.   Cardiovascular:      Rate and Rhythm: Normal rate and regular rhythm.      Pulses: Normal pulses.      Heart sounds: Normal heart sounds. No murmur heard.  Pulmonary:      Effort: No respiratory distress.      Breath sounds: Normal breath sounds. No wheezing.   Abdominal:      General: Bowel sounds are normal. There is no distension.      Palpations: Abdomen is soft.      Tenderness: There is no abdominal tenderness. There is no guarding or rebound.   Musculoskeletal:         General: Normal range of motion.   Skin:     General: Skin is warm.   Neurological:      General: No focal deficit present.      Mental Status: He is alert and oriented to person, place, and time.   Psychiatric:         Mood and Affect: Mood normal.         Behavior: Behavior normal.        Laboratory:  CBC:  Recent Labs   Lab 01/14/22  0348 01/18/22  0513 11/02/22  0912   WBC 8.17 5.77 5.59   RBC 4.06 L 3.59 L 4.38 L   Hemoglobin 13.0 L 11.5 L 13.7 L   Hematocrit 40.0 34.2 L 43.0   Platelets 141 L 150 128 L   MCV 99 H 95 98   MCH 32.0 H 32.0 H 31.3 H   MCHC 32.5 33.6 31.9 L       CMP:  Recent Labs   Lab 12/30/20  1238 10/29/21  0946 01/13/22  1351 11/02/22  0912 03/20/23  1115   Glucose 96 107   < > 102 95   Calcium 9.5 9.9   < > 9.3 9.4   Albumin 3.9 4.1  --  3.9  --    Total Protein 7.4 7.4  --  6.7  --    Sodium 140 138   < > 140 139   Potassium 4.4 4.6   < > 4.4 4.5   CO2 24 30 H   < > 26 23   Chloride 105 103   < > 105 104   BUN 14 17   < > 17 17    Creatinine 0.9 1.0   < > 0.9 0.9   Alkaline Phosphatase 42 L 35 L  --  26 L  --    ALT 35 21  --  21  --    AST 21 20  --  18  --    Total Bilirubin 0.3 0.8  --  0.6  --     < > = values in this interval not displayed.        LIPIDS:  Recent Labs   Lab 10/29/21  0946 11/02/22  0912   TSH  --  1.608   HDL 59 48   Cholesterol 220 H 204 H   Triglycerides 156 H 119   LDL Cholesterol 129.8 132.2   HDL/Cholesterol Ratio 26.8 23.5   Non-HDL Cholesterol 161 156   Total Cholesterol/HDL Ratio 3.7 4.3       TSH:  Recent Labs   Lab 11/02/22  0912   TSH 1.608       A1C:  Recent Labs   Lab 12/07/20  1639 11/02/22 0912   Hemoglobin A1C 5.2 5.1       Other:   Recent Labs   Lab 12/07/20  1639 12/09/20  0303 03/20/23  1115   Vit D, 25-Hydroxy 49  --   --    Ferritin  --    < > 232   Iron  --   --  117   Transferrin  --   --  238   TIBC  --   --  352   Saturated Iron  --   --  33    < > = values in this interval not displayed.     Recent Labs   Lab 10/29/21  0946 11/02/22 0912   PSA, Screen 2.5 3.1   Hepatitis C Ab  --  Non-reactive       Assessment/Plan     Jim Brown is a 53 y.o.male with:    Hypertension, unspecified type  -     Basic Metabolic Panel; Future; Expected date: 03/20/2023  - Controlled.  Cont current.     Thyroid nodule  -     US Soft Tissue Head Neck Thyroid; Future; Expected date: 03/20/2023  - Seen on recent Will check Thyroid U/S to further eval.     Pancreas cyst  -     Ambulatory referral/consult to Gastroenterology; Future; Expected date: 03/27/2023  -Refer to Dr. Bustamante for further eval.     Normocytic anemia  -     Vitamin B12; Future; Expected date: 03/20/2023  -     Folate; Future; Expected date: 03/20/2023  -     Ferritin; Future; Expected date: 03/20/2023  -     Iron and TIBC; Future; Expected date: 03/20/2023  - Check labs.     Hyperlipidemia, unspecified hyperlipidemia type  - Controlled.  Cont current regimen.    Malignant neoplasm of sigmoid colon  - Stable.  Fu by CRS.     Benign prostatic  hyperplasia, unspecified whether lower urinary tract symptoms present  - Fu by .  Inc PSA  Planning for repeat in near future.     Anxiety  - Cont Xanax daily-BID prn. Rarely needs this when stressed and caring for his Mom.  He does not wish to be on Zoloft. Could try Buspirone in future if needed.     Thrombocytopenia  - Stable.     Erectile dysfunction, unspecified erectile dysfunction type  - Stable.  Cont current regimen.      Chronic conditions status updated as per HPI.  Other than changes above, cont current medications and maintain follow up with specialists.  Follow up in about 6 months (around 9/20/2023) for well visit or sooner if needed.      Viviana Bee MD  [facility]

## 2023-03-20 ENCOUNTER — LAB VISIT (OUTPATIENT)
Dept: LAB | Facility: HOSPITAL | Age: 53
End: 2023-03-20
Attending: INTERNAL MEDICINE
Payer: COMMERCIAL

## 2023-03-20 ENCOUNTER — OFFICE VISIT (OUTPATIENT)
Dept: PRIMARY CARE CLINIC | Facility: CLINIC | Age: 53
End: 2023-03-20
Payer: COMMERCIAL

## 2023-03-20 VITALS
HEART RATE: 75 BPM | SYSTOLIC BLOOD PRESSURE: 126 MMHG | OXYGEN SATURATION: 98 % | BODY MASS INDEX: 34.72 KG/M2 | DIASTOLIC BLOOD PRESSURE: 84 MMHG | TEMPERATURE: 98 F | WEIGHT: 270.5 LBS | RESPIRATION RATE: 16 BRPM | HEIGHT: 74 IN

## 2023-03-20 DIAGNOSIS — D69.6 THROMBOCYTOPENIA: ICD-10-CM

## 2023-03-20 DIAGNOSIS — C18.7 MALIGNANT NEOPLASM OF SIGMOID COLON: ICD-10-CM

## 2023-03-20 DIAGNOSIS — R05.3 CHRONIC COUGH: ICD-10-CM

## 2023-03-20 DIAGNOSIS — N52.9 ERECTILE DYSFUNCTION, UNSPECIFIED ERECTILE DYSFUNCTION TYPE: ICD-10-CM

## 2023-03-20 DIAGNOSIS — I10 HYPERTENSION, UNSPECIFIED TYPE: ICD-10-CM

## 2023-03-20 DIAGNOSIS — D64.9 NORMOCYTIC ANEMIA: ICD-10-CM

## 2023-03-20 DIAGNOSIS — I10 HYPERTENSION, UNSPECIFIED TYPE: Primary | ICD-10-CM

## 2023-03-20 DIAGNOSIS — F41.9 ANXIETY: ICD-10-CM

## 2023-03-20 DIAGNOSIS — N40.0 BENIGN PROSTATIC HYPERPLASIA, UNSPECIFIED WHETHER LOWER URINARY TRACT SYMPTOMS PRESENT: ICD-10-CM

## 2023-03-20 DIAGNOSIS — E78.5 HYPERLIPIDEMIA, UNSPECIFIED HYPERLIPIDEMIA TYPE: ICD-10-CM

## 2023-03-20 DIAGNOSIS — E04.1 THYROID NODULE: ICD-10-CM

## 2023-03-20 DIAGNOSIS — K86.2 PANCREAS CYST: ICD-10-CM

## 2023-03-20 LAB
ANION GAP SERPL CALC-SCNC: 12 MMOL/L (ref 8–16)
BUN SERPL-MCNC: 17 MG/DL (ref 6–20)
CALCIUM SERPL-MCNC: 9.4 MG/DL (ref 8.7–10.5)
CHLORIDE SERPL-SCNC: 104 MMOL/L (ref 95–110)
CO2 SERPL-SCNC: 23 MMOL/L (ref 23–29)
CREAT SERPL-MCNC: 0.9 MG/DL (ref 0.5–1.4)
EST. GFR  (NO RACE VARIABLE): >60 ML/MIN/1.73 M^2
FERRITIN SERPL-MCNC: 232 NG/ML (ref 20–300)
FOLATE SERPL-MCNC: 7.4 NG/ML (ref 4–24)
GLUCOSE SERPL-MCNC: 95 MG/DL (ref 70–110)
IRON SERPL-MCNC: 117 UG/DL (ref 45–160)
POTASSIUM SERPL-SCNC: 4.5 MMOL/L (ref 3.5–5.1)
SATURATED IRON: 33 % (ref 20–50)
SODIUM SERPL-SCNC: 139 MMOL/L (ref 136–145)
TOTAL IRON BINDING CAPACITY: 352 UG/DL (ref 250–450)
TRANSFERRIN SERPL-MCNC: 238 MG/DL (ref 200–375)
VIT B12 SERPL-MCNC: 439 PG/ML (ref 210–950)

## 2023-03-20 PROCEDURE — 3079F DIAST BP 80-89 MM HG: CPT | Mod: CPTII,S$GLB,, | Performed by: INTERNAL MEDICINE

## 2023-03-20 PROCEDURE — 82728 ASSAY OF FERRITIN: CPT | Performed by: INTERNAL MEDICINE

## 2023-03-20 PROCEDURE — 3074F SYST BP LT 130 MM HG: CPT | Mod: CPTII,S$GLB,, | Performed by: INTERNAL MEDICINE

## 2023-03-20 PROCEDURE — 3008F BODY MASS INDEX DOCD: CPT | Mod: CPTII,S$GLB,, | Performed by: INTERNAL MEDICINE

## 2023-03-20 PROCEDURE — 99999 PR PBB SHADOW E&M-EST. PATIENT-LVL V: ICD-10-PCS | Mod: PBBFAC,,, | Performed by: INTERNAL MEDICINE

## 2023-03-20 PROCEDURE — 80048 BASIC METABOLIC PNL TOTAL CA: CPT | Performed by: INTERNAL MEDICINE

## 2023-03-20 PROCEDURE — 99214 PR OFFICE/OUTPT VISIT, EST, LEVL IV, 30-39 MIN: ICD-10-PCS | Mod: S$GLB,,, | Performed by: INTERNAL MEDICINE

## 2023-03-20 PROCEDURE — 82746 ASSAY OF FOLIC ACID SERUM: CPT | Performed by: INTERNAL MEDICINE

## 2023-03-20 PROCEDURE — 99214 OFFICE O/P EST MOD 30 MIN: CPT | Mod: S$GLB,,, | Performed by: INTERNAL MEDICINE

## 2023-03-20 PROCEDURE — 84466 ASSAY OF TRANSFERRIN: CPT | Performed by: INTERNAL MEDICINE

## 2023-03-20 PROCEDURE — 3079F PR MOST RECENT DIASTOLIC BLOOD PRESSURE 80-89 MM HG: ICD-10-PCS | Mod: CPTII,S$GLB,, | Performed by: INTERNAL MEDICINE

## 2023-03-20 PROCEDURE — 1159F PR MEDICATION LIST DOCUMENTED IN MEDICAL RECORD: ICD-10-PCS | Mod: CPTII,S$GLB,, | Performed by: INTERNAL MEDICINE

## 2023-03-20 PROCEDURE — 3074F PR MOST RECENT SYSTOLIC BLOOD PRESSURE < 130 MM HG: ICD-10-PCS | Mod: CPTII,S$GLB,, | Performed by: INTERNAL MEDICINE

## 2023-03-20 PROCEDURE — 99999 PR PBB SHADOW E&M-EST. PATIENT-LVL V: CPT | Mod: PBBFAC,,, | Performed by: INTERNAL MEDICINE

## 2023-03-20 PROCEDURE — 82607 VITAMIN B-12: CPT | Performed by: INTERNAL MEDICINE

## 2023-03-20 PROCEDURE — 1160F PR REVIEW ALL MEDS BY PRESCRIBER/CLIN PHARMACIST DOCUMENTED: ICD-10-PCS | Mod: CPTII,S$GLB,, | Performed by: INTERNAL MEDICINE

## 2023-03-20 PROCEDURE — 1160F RVW MEDS BY RX/DR IN RCRD: CPT | Mod: CPTII,S$GLB,, | Performed by: INTERNAL MEDICINE

## 2023-03-20 PROCEDURE — 36415 COLL VENOUS BLD VENIPUNCTURE: CPT | Mod: PN | Performed by: INTERNAL MEDICINE

## 2023-03-20 PROCEDURE — 1159F MED LIST DOCD IN RCRD: CPT | Mod: CPTII,S$GLB,, | Performed by: INTERNAL MEDICINE

## 2023-03-20 PROCEDURE — 3008F PR BODY MASS INDEX (BMI) DOCUMENTED: ICD-10-PCS | Mod: CPTII,S$GLB,, | Performed by: INTERNAL MEDICINE

## 2023-03-21 NOTE — PROGRESS NOTES
I sent pt a my chart message -  I reviewed your labs.  Your folic acid was normal and Vitamin B12 was low normal at 439. Your iron studies were normal.  Your kidney function looked good.  No further recommendations at this time.    Dr. VALE

## 2023-04-03 ENCOUNTER — HOSPITAL ENCOUNTER (OUTPATIENT)
Dept: RADIOLOGY | Facility: HOSPITAL | Age: 53
Discharge: HOME | End: 2023-04-03
Attending: INTERNAL MEDICINE
Payer: COMMERCIAL

## 2023-04-03 DIAGNOSIS — E04.1 THYROID NODULE: ICD-10-CM

## 2023-04-03 PROCEDURE — 76536 US EXAM OF HEAD AND NECK: CPT | Mod: TC

## 2023-04-03 PROCEDURE — 76536 US SOFT TISSUE HEAD NECK THYROID: ICD-10-PCS | Mod: 26,,, | Performed by: RADIOLOGY

## 2023-04-03 PROCEDURE — 76536 US EXAM OF HEAD AND NECK: CPT | Mod: 26,,, | Performed by: RADIOLOGY

## 2023-04-03 NOTE — PROGRESS NOTES
I sent pt a my chart message -  I reviewed your thyroid ultrasound which showed a 1.7 cm  nodule in the right lobe of the thyroid.  I would recommend we repeat the ultrasound in 1 yr.     Dr. VALE

## 2023-04-12 ENCOUNTER — OFFICE VISIT (OUTPATIENT)
Dept: SURGERY | Facility: CLINIC | Age: 53
End: 2023-04-12
Payer: COMMERCIAL

## 2023-04-12 VITALS
BODY MASS INDEX: 34.39 KG/M2 | SYSTOLIC BLOOD PRESSURE: 121 MMHG | DIASTOLIC BLOOD PRESSURE: 83 MMHG | WEIGHT: 267.88 LBS | HEART RATE: 67 BPM

## 2023-04-12 DIAGNOSIS — Z85.038 ENCOUNTER FOR FOLLOW-UP SURVEILLANCE OF COLON CANCER: Primary | ICD-10-CM

## 2023-04-12 DIAGNOSIS — Z08 ENCOUNTER FOR FOLLOW-UP SURVEILLANCE OF COLON CANCER: Primary | ICD-10-CM

## 2023-04-12 PROCEDURE — 3079F DIAST BP 80-89 MM HG: CPT | Mod: CPTII,S$GLB,, | Performed by: COLON & RECTAL SURGERY

## 2023-04-12 PROCEDURE — 99999 PR PBB SHADOW E&M-EST. PATIENT-LVL III: CPT | Mod: PBBFAC,,, | Performed by: COLON & RECTAL SURGERY

## 2023-04-12 PROCEDURE — 3079F PR MOST RECENT DIASTOLIC BLOOD PRESSURE 80-89 MM HG: ICD-10-PCS | Mod: CPTII,S$GLB,, | Performed by: COLON & RECTAL SURGERY

## 2023-04-12 PROCEDURE — 3074F SYST BP LT 130 MM HG: CPT | Mod: CPTII,S$GLB,, | Performed by: COLON & RECTAL SURGERY

## 2023-04-12 PROCEDURE — 99214 PR OFFICE/OUTPT VISIT, EST, LEVL IV, 30-39 MIN: ICD-10-PCS | Mod: S$GLB,,, | Performed by: COLON & RECTAL SURGERY

## 2023-04-12 PROCEDURE — 99214 OFFICE O/P EST MOD 30 MIN: CPT | Mod: S$GLB,,, | Performed by: COLON & RECTAL SURGERY

## 2023-04-12 PROCEDURE — 3008F PR BODY MASS INDEX (BMI) DOCUMENTED: ICD-10-PCS | Mod: CPTII,S$GLB,, | Performed by: COLON & RECTAL SURGERY

## 2023-04-12 PROCEDURE — 1159F PR MEDICATION LIST DOCUMENTED IN MEDICAL RECORD: ICD-10-PCS | Mod: CPTII,S$GLB,, | Performed by: COLON & RECTAL SURGERY

## 2023-04-12 PROCEDURE — 1159F MED LIST DOCD IN RCRD: CPT | Mod: CPTII,S$GLB,, | Performed by: COLON & RECTAL SURGERY

## 2023-04-12 PROCEDURE — 99999 PR PBB SHADOW E&M-EST. PATIENT-LVL III: ICD-10-PCS | Mod: PBBFAC,,, | Performed by: COLON & RECTAL SURGERY

## 2023-04-12 PROCEDURE — 3008F BODY MASS INDEX DOCD: CPT | Mod: CPTII,S$GLB,, | Performed by: COLON & RECTAL SURGERY

## 2023-04-12 PROCEDURE — 3074F PR MOST RECENT SYSTOLIC BLOOD PRESSURE < 130 MM HG: ICD-10-PCS | Mod: CPTII,S$GLB,, | Performed by: COLON & RECTAL SURGERY

## 2023-04-12 NOTE — PROGRESS NOTES
Jim Brown is a 52 y.o. 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  No pain.  Numb over hypogastrium.  BMs 2 per day, no blood  Appetite and energy levels good.  Voiding and sex function nl.       8/24/2022 - Interval history  Feels well overall, having 1-2BM/day. Denies straining or constipation. No blood per rectum. Eating well    1-  Interval hx:  follow up stage I rectosigmoid cancer.    Feels well.  No abd pain.   BMs nl no blood  Appetite and energy levels good.    Activity level good.      2/1/23 CT A/P  Impression:     1.  Postoperative change related to low anterior resection and splenic flexure mobilization for colon cancer resection.   no evidence of local disease recurrence.  2.  No evidence of metastatic disease within the chest, abdomen, or pelvis.  3.  Small right thyroid lobe nodule, which may be better evaluated with ultrasound.  4.  Few solid pulmonary nodules, unchanged from CT 12/10/2021.  Recommend continued attention on follow-up.    11-  resected piecemeal by colonoscopy  Findings:        The perianal and digital rectal examinations were normal.        A greater than 50 mm polyp was found in the sigmoid colon. The polyp        was semi-sessile. The polyp was removed with a piecemeal technique        using a hot snare. Resection and retrieval were complete.        Verification of patient identification for the specimen was done.        Estimated blood loss was minimal.        The terminal ileum appeared normal.     Pathology:    RECTUM, BIOPSY:  -Moderately differentiated adenocarcinoma arising within a tubulovillous adenoma with high-grade  dysplasia, see microscopic and comment.  COMMENT:  This case was also reviewed by Dr. Randi Allen, GI Path in our [place] division, who concurs with  the above diagnosis. A complete copy of her report is  attached.  Tatyana Gardner M.D.  Report Attached  Performing site:  [address]    1-  Lap LAR      Pathology    Collected: 01/13/22 1259   Result status: Final   Resulting lab: [facility]   Value: 1. SIGMOID COLON AND RECTUM, LOW ANTERIOR RESECTION:   - No evidence of residual adenoma or malignancy, pT0   - Tattoo site (entirely submitted for histopathologic evaluation)   - Sixteen lymph nodes, negative for metastatic disease (0/16), pN0   - Background colorectal mucosa exhibiting reactive epithelial changes and   diverticular disease associated with chronic inflammation   - See comments for synoptic report   2. DONOT, PROXIMAL, EXCISION:   - Segment of colonic tissue with no significant histopathologic abnormality   - No evidence of dysplasia or malignancy   3. DONUT, DISTAL, EXCISION:   - Segment of colorectal tissue with no significant histopathologic abnormality   - No evidence of dysplasia or malignancy    Comment: Interp By Jay Castillo M.D., Signed on 01/21/2022 at 17:16              10-  colonoscopy  Findings:        The perianal and digital rectal examinations were normal.        There was evidence of a prior end-to-end colo-rectal anastomosis in        the proximal rectum. This was patent and was characterized by        healthy appearing mucosa and an intact appearance. The anastomosis        was traversed.        Two semi-sessile polyps were found in the distal transverse colon.        The polyps were 2 to 4 mm in size. These polyps were removed with a        cold snare. Resection and retrieval were complete. Verification of        patient identification for the specimen was done. Estimated blood        loss was minimal.        The terminal ileum appeared normal.        The retroflexed view of the distal rectum and anal verge was normal        and showed no anal or rectal abnormalities.       Final Pathologic Diagnosis Transverse colon,  biopsy:       - Fragments of tubular adenoma(s)        1-  Interval hx:  follow up stage I rectosigmoid cancer.    Feels well.  No abd pain.   BMs nl no blood  Appetite and energy levels good.    Activity level good.      1- CT scan CAP     Impression:     1.  Postoperative change related to low anterior resection and splenic flexure mobilization for colon cancer resection.   no evidence of local disease recurrence.     2.  No evidence of metastatic disease within the chest, abdomen, or pelvis.     3.  Small right thyroid lobe nodule, which may be better evaluated with ultrasound.     4.  Few solid pulmonary nodules, unchanged from CT 12/10/2021.  Recommend continued attention on follow-up.    Interval Hx 4/12/23:   Since last visit, still reports feeling well, denies abdominal pain.   Denies bloody BM, having BM 1/day.  No blood  Appetite and energy levels good.  Voiding and sex function nl.           Past Medical History:   Diagnosis Date    Hyperlipidemia 11/14/2014 11/13/2014:     Hypertension         Past Surgical History:   Procedure Laterality Date    COLON SURGERY      COLONOSCOPY  08/10/2015    COLONOSCOPY N/A 11/23/2021    Procedure: COLONOSCOPY;  Surgeon: GM Webber MD;  Location: [facility] KEV (4TH FLR);  Service: Endoscopy;  Laterality: N/A;  Covid test 11/23 yulisa Peña emailed to [email or web address]    COLONOSCOPY N/A 10/11/2022    Procedure: COLONOSCOPY;  Surgeon: GM Webber MD;  Location: [facility] ENDO (4TH FLR);  Service: Endoscopy;  Laterality: N/A;  Rapid, instr. via mail & portal, clears 4 hrs prior, am prep -PC    FLEXIBLE SIGMOIDOSCOPY  01/13/2022    Procedure: SIGMOIDOSCOPY, FLEXIBLE;  Surgeon: GM Webber MD;  Location: [facility] OR 2ND FLR;  Service: Colon and Rectal;;    MOBILIZATION OF SPLENIC FLEXURE  01/13/2022    Procedure: MOBILIZATION, SPLENIC FLEXURE;  Surgeon: GM Webber MD;  Location: [facility] OR 2ND FLR;  Service: Colon and  Rectal;;    ROBOT-ASSISTED LOW ANTERIOR RESECTION OF COLON  01/13/2022    Procedure: ROBOTIC RESECTION, COLON, LOW ANTERIOR;  Surgeon: GM Webber MD;  Location: [facility] OR [address] [place] [address];  Service: Colon and Rectal;;    TONSILLECTOMY         Review of patient's allergies indicates:   Allergen Reactions    Lisinopril Other (See Comments)     cough       Family History   Problem Relation Age of Onset    Dementia Mother     Cancer Maternal Grandfather     Coronary artery disease Maternal Grandfather     Heart attack Maternal Grandfather 55    Cancer Paternal Grandmother         colon cancer       Social History     Socioeconomic History    Marital status:    Tobacco Use    Smoking status: Never    Smokeless tobacco: Never   Substance and Sexual Activity    Alcohol use: Yes     Alcohol/week: 3.0 standard drinks     Types: 3 Drinks containing 0.5 oz of alcohol per week     Comment: 2-3 on weekends    Drug use: Never    Sexual activity: Yes     Partners: Female       ROS:  GENERAL: No fever, chills, fatigability or weight loss.  Integument: No rashes, redness, icterus  CHEST: Denies MARIE, cyanosis, wheezing, cough and sputum production.  CARDIOVASCULAR: Denies chest pain, PND, orthopnea or reduced exercise tolerance.  GI: Denies abd pain, dysphagia, nausea, vomiting, no hematemesis   : Denies burning on urination, no hematuria, no bacteriuria  MSK: No deformities, swelling, joint pain swelling  Neurologic: No HAs, seizures, weakness, paresthesias, gait problems    PE:  General appearance well  There were no vitals taken for this visit.  Sclera/ Skin anicteric  LN none palpable  AT NC EOMI  Neck supple trachea midline   Chest symmetric, nl excursion, no retractions, breathing comfortably  Abdomen      ND soft NT.  no masses, no organomegaly  EXT - no CCE  Neuro:  Mood/ affect nl, alert and oriented x 3, moves all ext's, gait nl      Assessment:  KATLYN  Good performance status    Plan:  Check yearly CT scan  CEA  OV 3  months

## 2023-05-18 ENCOUNTER — OFFICE VISIT (OUTPATIENT)
Dept: GASTROENTEROLOGY | Facility: CLINIC | Age: 53
End: 2023-05-18
Payer: COMMERCIAL

## 2023-05-18 VITALS
WEIGHT: 265 LBS | BODY MASS INDEX: 35.89 KG/M2 | DIASTOLIC BLOOD PRESSURE: 94 MMHG | HEART RATE: 71 BPM | SYSTOLIC BLOOD PRESSURE: 147 MMHG | HEIGHT: 72 IN

## 2023-05-18 DIAGNOSIS — K86.2 PANCREAS CYST: ICD-10-CM

## 2023-05-18 PROCEDURE — 99999 PR PBB SHADOW E&M-EST. PATIENT-LVL IV: CPT | Mod: PBBFAC,,, | Performed by: INTERNAL MEDICINE

## 2023-05-18 PROCEDURE — 3008F PR BODY MASS INDEX (BMI) DOCUMENTED: ICD-10-PCS | Mod: CPTII,S$GLB,, | Performed by: INTERNAL MEDICINE

## 2023-05-18 PROCEDURE — 1159F MED LIST DOCD IN RCRD: CPT | Mod: CPTII,S$GLB,, | Performed by: INTERNAL MEDICINE

## 2023-05-18 PROCEDURE — 1160F RVW MEDS BY RX/DR IN RCRD: CPT | Mod: CPTII,S$GLB,, | Performed by: INTERNAL MEDICINE

## 2023-05-18 PROCEDURE — 3008F BODY MASS INDEX DOCD: CPT | Mod: CPTII,S$GLB,, | Performed by: INTERNAL MEDICINE

## 2023-05-18 PROCEDURE — 99204 PR OFFICE/OUTPT VISIT, NEW, LEVL IV, 45-59 MIN: ICD-10-PCS | Mod: S$GLB,,, | Performed by: INTERNAL MEDICINE

## 2023-05-18 PROCEDURE — 99204 OFFICE O/P NEW MOD 45 MIN: CPT | Mod: S$GLB,,, | Performed by: INTERNAL MEDICINE

## 2023-05-18 PROCEDURE — 3077F SYST BP >= 140 MM HG: CPT | Mod: CPTII,S$GLB,, | Performed by: INTERNAL MEDICINE

## 2023-05-18 PROCEDURE — 1159F PR MEDICATION LIST DOCUMENTED IN MEDICAL RECORD: ICD-10-PCS | Mod: CPTII,S$GLB,, | Performed by: INTERNAL MEDICINE

## 2023-05-18 PROCEDURE — 3080F PR MOST RECENT DIASTOLIC BLOOD PRESSURE >= 90 MM HG: ICD-10-PCS | Mod: CPTII,S$GLB,, | Performed by: INTERNAL MEDICINE

## 2023-05-18 PROCEDURE — 4010F ACE/ARB THERAPY RXD/TAKEN: CPT | Mod: CPTII,S$GLB,, | Performed by: INTERNAL MEDICINE

## 2023-05-18 PROCEDURE — 4010F PR ACE/ARB THEARPY RXD/TAKEN: ICD-10-PCS | Mod: CPTII,S$GLB,, | Performed by: INTERNAL MEDICINE

## 2023-05-18 PROCEDURE — 1160F PR REVIEW ALL MEDS BY PRESCRIBER/CLIN PHARMACIST DOCUMENTED: ICD-10-PCS | Mod: CPTII,S$GLB,, | Performed by: INTERNAL MEDICINE

## 2023-05-18 PROCEDURE — 3080F DIAST BP >= 90 MM HG: CPT | Mod: CPTII,S$GLB,, | Performed by: INTERNAL MEDICINE

## 2023-05-18 PROCEDURE — 99999 PR PBB SHADOW E&M-EST. PATIENT-LVL IV: ICD-10-PCS | Mod: PBBFAC,,, | Performed by: INTERNAL MEDICINE

## 2023-05-18 PROCEDURE — 3077F PR MOST RECENT SYSTOLIC BLOOD PRESSURE >= 140 MM HG: ICD-10-PCS | Mod: CPTII,S$GLB,, | Performed by: INTERNAL MEDICINE

## 2023-05-18 NOTE — PROGRESS NOTES
Gastroenterology: [facility]      SUBJECTIVE:         Chief Complaint: Here for evaluation of a pancreatic cyst     History of Present Illness:  Patient is a 53 y.o. male presents with a pancreatic cyst. The cyst was first noted 2/20/2023. It's located in the head.  It measures 8 mm (measured by me) in size on the most recent imaging.  It is not symptomatic. Previous studies include CT scan.   Since initial detection, the cyst has not increased in size. The pancreatic duct is not dilated.    Previous FNA of the cyst has not been done    Prior Imaging:    CT scan images reviewed with the patient. Suspect pancreas 8 mm cyst no dilated PD or mass. Liver cyst (15 mm).    Personal/family factors:    There is not a family history of pancreatic cancer     The patient does not smoke.    ECOG status 0 - Asymptomatic            Past Medical History:   Diagnosis Date    Hyperlipidemia 11/14/2014 11/13/2014:     Hypertension        Past Surgical History:   Procedure Laterality Date    COLON SURGERY      COLONOSCOPY  08/10/2015    COLONOSCOPY N/A 11/23/2021    Procedure: COLONOSCOPY;  Surgeon: GM Webber MD;  Location: [facility] (4TH FLR);  Service: Endoscopy;  Laterality: N/A;  Covid test 11/23 [place], yulisa emailed to [email or web address]    COLONOSCOPY N/A 10/11/2022    Procedure: COLONOSCOPY;  Surgeon: GM Webber MD;  Location: [facility] (4TH FLR);  Service: Endoscopy;  Laterality: N/A;  Rapid, instr. via mail & portal, clears 4 hrs prior, am prep -PC    FLEXIBLE SIGMOIDOSCOPY  01/13/2022    Procedure: SIGMOIDOSCOPY, FLEXIBLE;  Surgeon: GM Webber MD;  Location: [facility] OR 2ND FLR;  Service: Colon and Rectal;;    MOBILIZATION OF SPLENIC FLEXURE  01/13/2022    Procedure: MOBILIZATION, SPLENIC FLEXURE;  Surgeon: GM Webber MD;  Location: [facility] OR 2ND FLR;  Service: Colon and Rectal;;    ROBOT-ASSISTED LOW ANTERIOR RESECTION OF COLON  01/13/2022    Procedure:  ROBOTIC RESECTION, COLON, LOW ANTERIOR;  Surgeon: GM Webber MD;  Location: [facility] OR [address] [place] [address];  Service: Colon and Rectal;;    TONSILLECTOMY         Family History   Problem Relation Age of Onset    Dementia Mother     Cancer Maternal Grandfather     Coronary artery disease Maternal Grandfather     Heart attack Maternal Grandfather 55    Colon cancer Paternal Grandmother     Cancer Paternal Grandmother         colon cancer    Esophageal cancer Neg Hx        Social History     Socioeconomic History    Marital status:    Tobacco Use    Smoking status: Never    Smokeless tobacco: Never   Substance and Sexual Activity    Alcohol use: Yes     Alcohol/week: 3.0 standard drinks     Types: 3 Drinks containing 0.5 oz of alcohol per week     Comment: 2-3 on weekends    Drug use: Never    Sexual activity: Yes     Partners: Female       Current Outpatient Medications on File Prior to Visit   Medication Sig Dispense Refill    albuterol (PROVENTIL HFA) 90 mcg/actuation inhaler Inhale 2 puffs into the lungs every 6 (six) hours as needed for Wheezing. Rescue 18 g 0    ascorbic acid, vitamin C, (VITAMIN C) 500 MG tablet Take 1 tablet (500 mg total) by mouth 2 (two) times daily.      losartan (COZAAR) 50 MG tablet TAKE 1 TABLET(50 MG) BY MOUTH EVERY DAY 90 tablet 1    meloxicam (MOBIC) 15 MG tablet Take 15 mg by mouth once daily.      multivitamin Tab Take 1 tablet by mouth once daily. 30 tablet 11    sildenafiL (VIAGRA) 100 MG tablet Take 1 tablet (100 mg total) by mouth daily as needed. 30 tablet 11    sod sulf-pot chloride-mag sulf (SUTAB) 1.479-0.188- 0.225 gram tablet Take 12 tablets by mouth once daily. Take according to package instructions with indicated amount of water. 24 tablet 0    tamsulosin (FLOMAX) 0.4 mg Cap TAKE 1 CAPSULE(0.4 MG) BY MOUTH EVERY DAY 30 capsule 12    tamsulosin (FLOMAX) 0.4 mg Cap Take 1 capsule (0.4 mg total) by mouth once daily. 30 capsule 12    ALPRAZolam (XANAX) 0.25 MG tablet Take 1  tablet (0.25 mg total) by mouth 3 (three) times daily as needed (anxiety). 60 tablet 0     Current Facility-Administered Medications on File Prior to Visit   Medication Dose Route Frequency Provider Last Rate Last Admin    gabapentin capsule 300 mg  300 mg Oral TID Didi Claros NP   300 mg at 01/18/22 0849       Review of patient's allergies indicates:   Allergen Reactions    Lisinopril Other (See Comments)     cough       Physical Exam:  General: Well-developed, well-appearing, no acute distress        Laboratory:   Lab Results   Component Value Date    ALT 21 11/02/2022    AST 18 11/02/2022    ALKPHOS 26 (L) 11/02/2022    BILITOT 0.6 11/02/2022     Lab Results   Component Value Date    WBC 5.59 11/02/2022    HGB 13.7 (L) 11/02/2022    HCT 43.0 11/02/2022    MCV 98 11/02/2022     (L) 11/02/2022     Lab Results   Component Value Date    INR 1.0 12/08/2020           Diagnostic/Imaging Results:  As above    ASSESSMENT/PLAN:     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 Measuring 8 mm in size,  unchanged  Most likely etiology at this point is a  pancreas cyst of uncertain significant    We discussed in detail the natural history of pancreatic cysts, the therapy involved, and the benefits of multimodality surveillance imaging including Ct, MRI, and EUS with FNA    Plan:     Pancreatic cyst- Will tentatively plan for an EUS for confirmation    The impression and plan was discussed in detail with the patient and family. All questions have been answered and the patient voices understanding of our plan at this point. The risk of the procedure was discussed in detail which includes but not limited to bleeding, infection, perforation in some cases requiring surgery with its spectrum of complications.          David Bustamante MD  Advanced Endoscopy  Department of Gastroenterology

## 2023-05-22 ENCOUNTER — TELEPHONE (OUTPATIENT)
Dept: ENDOSCOPY | Facility: HOSPITAL | Age: 53
End: 2023-05-22
Payer: COMMERCIAL

## 2023-05-22 ENCOUNTER — PATIENT MESSAGE (OUTPATIENT)
Dept: ENDOSCOPY | Facility: HOSPITAL | Age: 53
End: 2023-05-22
Payer: COMMERCIAL

## 2023-05-22 ENCOUNTER — PATIENT MESSAGE (OUTPATIENT)
Dept: GASTROENTEROLOGY | Facility: CLINIC | Age: 53
End: 2023-05-22
Payer: COMMERCIAL

## 2023-05-22 NOTE — TELEPHONE ENCOUNTER
Telephoned pt to schedule EUS.  Spoke with pt and procedure scheduled for 5/30/23 at 8:00am.  Reviewed history and medications.  Instructed on procedure and preparation.  Pt verbalized understanding.  Instructions sent via patient portal.  Instructed pt on how to locate prep instructions within the portal.  Pt verbalized understanding.

## 2023-05-30 ENCOUNTER — HOSPITAL ENCOUNTER (OUTPATIENT)
Facility: HOSPITAL | Age: 53
Discharge: HOME | End: 2023-05-30
Attending: INTERNAL MEDICINE | Admitting: INTERNAL MEDICINE
Payer: COMMERCIAL

## 2023-05-30 ENCOUNTER — ANESTHESIA EVENT (OUTPATIENT)
Dept: ENDOSCOPY | Facility: HOSPITAL | Age: 53
End: 2023-05-30
Payer: COMMERCIAL

## 2023-05-30 ENCOUNTER — ANESTHESIA (OUTPATIENT)
Dept: ENDOSCOPY | Facility: HOSPITAL | Age: 53
End: 2023-05-30
Payer: COMMERCIAL

## 2023-05-30 VITALS
BODY MASS INDEX: 35.89 KG/M2 | SYSTOLIC BLOOD PRESSURE: 154 MMHG | DIASTOLIC BLOOD PRESSURE: 93 MMHG | RESPIRATION RATE: 18 BRPM | TEMPERATURE: 98 F | HEART RATE: 67 BPM | OXYGEN SATURATION: 99 % | HEIGHT: 72 IN | WEIGHT: 265 LBS

## 2023-05-30 DIAGNOSIS — K86.2 PANCREAS CYST: ICD-10-CM

## 2023-05-30 PROCEDURE — D9220A PRA ANESTHESIA: Mod: ANES,,, | Performed by: ANESTHESIOLOGY

## 2023-05-30 PROCEDURE — 37000009 HC ANESTHESIA EA ADD 15 MINS: Performed by: INTERNAL MEDICINE

## 2023-05-30 PROCEDURE — 37000008 HC ANESTHESIA 1ST 15 MINUTES: Performed by: INTERNAL MEDICINE

## 2023-05-30 PROCEDURE — 43259 EGD US EXAM DUODENUM/JEJUNUM: CPT | Mod: ,,, | Performed by: INTERNAL MEDICINE

## 2023-05-30 PROCEDURE — 63600175 PHARM REV CODE 636 W HCPCS: Performed by: NURSE ANESTHETIST, CERTIFIED REGISTERED

## 2023-05-30 PROCEDURE — 43259 EGD US EXAM DUODENUM/JEJUNUM: CPT | Performed by: INTERNAL MEDICINE

## 2023-05-30 PROCEDURE — 43259 PR ENDOSCOPIC ULTRASOUND EXAM: ICD-10-PCS | Mod: ,,, | Performed by: INTERNAL MEDICINE

## 2023-05-30 PROCEDURE — D9220A PRA ANESTHESIA: ICD-10-PCS | Mod: CRNA,,, | Performed by: NURSE ANESTHETIST, CERTIFIED REGISTERED

## 2023-05-30 PROCEDURE — D9220A PRA ANESTHESIA: Mod: CRNA,,, | Performed by: NURSE ANESTHETIST, CERTIFIED REGISTERED

## 2023-05-30 PROCEDURE — 25000003 PHARM REV CODE 250: Performed by: NURSE ANESTHETIST, CERTIFIED REGISTERED

## 2023-05-30 PROCEDURE — D9220A PRA ANESTHESIA: ICD-10-PCS | Mod: ANES,,, | Performed by: ANESTHESIOLOGY

## 2023-05-30 RX ORDER — PROCHLORPERAZINE EDISYLATE 5 MG/ML
5 INJECTION INTRAMUSCULAR; INTRAVENOUS EVERY 30 MIN PRN
Status: DISCONTINUED | OUTPATIENT
Start: 2023-05-30 | End: 2023-05-30 | Stop reason: HOSPADM

## 2023-05-30 RX ORDER — PROPOFOL 10 MG/ML
VIAL (ML) INTRAVENOUS CONTINUOUS PRN
Status: DISCONTINUED | OUTPATIENT
Start: 2023-05-30 | End: 2023-05-30

## 2023-05-30 RX ORDER — SODIUM CHLORIDE 0.9 % (FLUSH) 0.9 %
10 SYRINGE (ML) INJECTION
Status: DISCONTINUED | OUTPATIENT
Start: 2023-05-30 | End: 2023-05-30 | Stop reason: HOSPADM

## 2023-05-30 RX ORDER — SODIUM CHLORIDE 9 MG/ML
INJECTION, SOLUTION INTRAVENOUS CONTINUOUS
Status: DISCONTINUED | OUTPATIENT
Start: 2023-05-30 | End: 2023-05-30 | Stop reason: HOSPADM

## 2023-05-30 RX ORDER — MIDAZOLAM HYDROCHLORIDE 1 MG/ML
INJECTION, SOLUTION INTRAMUSCULAR; INTRAVENOUS
Status: DISCONTINUED | OUTPATIENT
Start: 2023-05-30 | End: 2023-05-30

## 2023-05-30 RX ORDER — PROPOFOL 10 MG/ML
VIAL (ML) INTRAVENOUS
Status: DISCONTINUED | OUTPATIENT
Start: 2023-05-30 | End: 2023-05-30

## 2023-05-30 RX ADMIN — Medication 150 MCG/KG/MIN: at 08:05

## 2023-05-30 RX ADMIN — SODIUM CHLORIDE, SODIUM GLUCONATE, SODIUM ACETATE, POTASSIUM CHLORIDE, MAGNESIUM CHLORIDE, SODIUM PHOSPHATE, DIBASIC, AND POTASSIUM PHOSPHATE: .53; .5; .37; .037; .03; .012; .00082 INJECTION, SOLUTION INTRAVENOUS at 08:05

## 2023-05-30 RX ADMIN — PROPOFOL 70 MG: 10 INJECTION, EMULSION INTRAVENOUS at 08:05

## 2023-05-30 RX ADMIN — MIDAZOLAM HYDROCHLORIDE 2 MG: 1 INJECTION, SOLUTION INTRAMUSCULAR; INTRAVENOUS at 08:05

## 2023-05-30 NOTE — PROVATION PATIENT INSTRUCTIONS
Discharge Summary/Instructions after an Endoscopic Procedure  Patient Name: Jim Brown  Patient MRN: [id number]  Patient YOB: 1970  Tuesday, May 30, 2023  Jet Garcia MD  Dear patient,  As a result of recent federal legislation (The Federal Cures Act), you may   receive lab or pathology results from your procedure in your [facility]   account before your physician is able to contact you. Your physician or   their representative will relay the results to you with their   recommendations at their soonest availability.  Thank you,  RESTRICTIONS:  During your procedure today, you received medications for sedation.  These   medications may affect your judgment, balance and coordination.  Therefore,   for 24 hours, you have the following restrictions:   - DO NOT drive a car, operate machinery, make legal/financial decisions,   sign important papers or drink alcohol.    ACTIVITY:  Today: no heavy lifting, straining or running due to procedural   sedation/anesthesia.  The following day: return to full activity including work.  DIET:  Eat and drink normally unless instructed otherwise.     TREATMENT FOR COMMON SIDE EFFECTS:  - Mild abdominal pain, nausea, belching, bloating or excessive gas:  rest,   eat lightly and use a heating pad.  - Sore Throat: treat with throat lozenges and/or gargle with warm salt   water.  - Because air was used during the procedure, expelling large amounts of air   from your rectum or belching is normal.  - If a bowel prep was taken, you may not have a bowel movement for 1-3 days.    This is normal.  SYMPTOMS TO WATCH FOR AND REPORT TO YOUR PHYSICIAN:  1. Abdominal pain or bloating, other than gas cramps.  2. Chest pain.  3. Back pain.  4. Signs of infection such as: chills or fever occurring within 24 hours   after the procedure.  5. Rectal bleeding, which would show as bright red, maroon, or black stools.   (A tablespoon of blood from the rectum is not serious, especially if    hemorrhoids are present.)  6. Vomiting.  7. Weakness or dizziness.  GO DIRECTLY TO THE NEAREST EMERGENCY ROOM IF YOU HAVE ANY OF THE FOLLOWING:      Difficulty breathing              Chills and/or fever over 101 F   Persistent vomiting and/or vomiting blood   Severe abdominal pain   Severe chest pain   Black, tarry stools   Bleeding- more than one tablespoon   Any other symptom or condition that you feel may need urgent attention  Your doctor recommends these additional instructions:  If any biopsies were taken, your doctors clinic will contact you in 1 to 2   weeks with any results.  - Discharge patient to home.   - Resume previous diet.   - Continue present medications.   - Return to referring physician as previously scheduled.   - Subsequent surveillance imaging for his colon cancer can continue to   follow the area in his pancreas.  No solid or cystic structures noted  For questions, problems or results please call your physician - Jet Garcia MD at Work:  [phone].  [facility], EMERGENCY ROOM PHONE NUMBER: [phone]  IF A COMPLICATION OR EMERGENCY SITUATION ARISES AND YOU ARE UNABLE TO REACH   YOUR PHYSICIAN - GO DIRECTLY TO THE EMERGENCY ROOM.  Jet Garcia MD  5/30/2023 8:33:40 AM  This report has been verified and signed electronically.  Dear patient,  As a result of recent federal legislation (The Federal Cures Act), you may   receive lab or pathology results from your procedure in your [facility]   account before your physician is able to contact you. Your physician or   their representative will relay the results to you with their   recommendations at their soonest availability.  Thank you,  PROVATION

## 2023-05-30 NOTE — PLAN OF CARE
Discharge instructions given and explained to patient with verbalization of understanding all instructions. Patients v/s stable, denies n/v and tolerating po, rates pain level tolerable, IV removed, and patient wife here to  for discharge home.

## 2023-05-30 NOTE — ANESTHESIA POSTPROCEDURE EVALUATION
Anesthesia Post Evaluation    Patient: Jim Brown    Procedure(s) Performed: Procedure(s) (LRB):  ULTRASOUND, UPPER GI TRACT, ENDOSCOPIC (N/A)    Final Anesthesia Type: general      Patient location during evaluation: PACU  Patient participation: Yes- Able to Participate  Level of consciousness: awake and alert and oriented  Post-procedure vital signs: reviewed and stable  Pain management: adequate  Airway patency: patent    PONV status at discharge: No PONV  Anesthetic complications: no      Cardiovascular status: blood pressure returned to baseline and hemodynamically stable  Respiratory status: unassisted, spontaneous ventilation and room air  Hydration status: euvolemic  Follow-up not needed.          Vitals Value Taken Time   /93 05/30/23 0900   Temp 36.7 °C (98.1 °F) 05/30/23 0825   Pulse 67 05/30/23 0900   Resp 18 05/30/23 0900   SpO2 99 % 05/30/23 0900         No case tracking events are documented in the log.      Pain/Traci Score: Traci Score: 10 (5/30/2023  8:45 AM)

## 2023-05-30 NOTE — ANESTHESIA PREPROCEDURE EVALUATION
05/30/2023  Jim Brown is a 53 y.o., male.      Pre-op Assessment    I have reviewed the Patient Summary Reports.     I have reviewed the Nursing Notes. I have reviewed the NPO Status.   I have reviewed the Medications.     Review of Systems  Anesthesia Hx:  No problems with previous Anesthesia  History of prior surgery of interest to airway management or planning:  Denies Personal Hx of Anesthesia complications.   Social:  Non-Smoker, Social Alcohol Use    Hematology/Oncology:     Oncology Normal    -- Anemia:   EENT/Dental:EENT/Dental Normal   Cardiovascular:   Hypertension  Denies Angina. hyperlipidemia  Denies MARIE.    Pulmonary:   Denies Shortness of breath.    Renal/:  Renal/ Normal     Hepatic/GI:   Denies GERD.    Musculoskeletal:  Musculoskeletal Normal    Neurological:  Neurology Normal    Endocrine:  Obesity / BMI > 30  Dermatological:  Skin Normal    Psych:  Psychiatric Normal           Physical Exam  General: Well nourished, Cooperative, Alert and Oriented    Airway:  Mallampati: III / II  Mouth Opening: Normal  TM Distance: Normal  Tongue: Normal  Neck ROM: Normal ROM    Dental:  Intact    Chest/Lungs:  Clear to auscultation, Normal Respiratory Rate    Heart:  Rate: Normal  Rhythm: Regular Rhythm  Sounds: Normal        Anesthesia Plan  Type of Anesthesia, risks & benefits discussed:    Anesthesia Type: Gen Natural Airway  Intra-op Monitoring Plan: Standard ASA Monitors  Post Op Pain Control Plan:   (medical reason for not using multimodal pain management)  Induction:  IV  Informed Consent: Informed consent signed with the Patient and all parties understand the risks and agree with anesthesia plan.  All questions answered.   ASA Score: 2  Day of Surgery Review of History & Physical: H&P Update referred to the surgeon/provider.    Ready For Surgery From Anesthesia Perspective.      .

## 2023-05-30 NOTE — TRANSFER OF CARE
Anesthesia Transfer of Care Note    Patient: Jim Brwon    Procedure(s) Performed: Procedure(s) (LRB):  ULTRASOUND, UPPER GI TRACT, ENDOSCOPIC (N/A)    Patient location: PACU    Anesthesia Type: general    Transport from OR: Transported from OR on room air with adequate spontaneous ventilation    Post pain: adequate analgesia    Post assessment: no apparent anesthetic complications and tolerated procedure well    Post vital signs: stable    Level of consciousness: awake, alert and oriented    Nausea/Vomiting: no nausea/vomiting    Complications: none    Transfer of care protocol was followed      Last vitals:   Visit Vitals  BP (!) 138/93   Pulse 65   Temp 36.7 °C (98.1 °F)   Resp 17   Ht 6' (1.829 m)   Wt 120.2 kg (265 lb)   SpO2 98%   BMI 35.94 kg/m²

## 2023-05-30 NOTE — H&P
Short Stay Endoscopy History and Physical    PCP - Viviana Bee MD  Referring Physician - David Bustamante MD  [address] [place] [address]    Procedure - eus  ASA - per anesthesia  Mallampati - per anesthesia  History of Anesthesia problems - no  Family history Anesthesia problems -  no   Plan of anesthesia - General    HPI:  This is a 53 y.o. male here for evaluation of: panc cyst    Reflux - no  Dysphagia - no  Abdominal pain - no  Diarrhea - no    ROS:  Constitutional: No fevers, chills, No weight loss  CV: No chest pain  Pulm: No cough, No shortness of breath  Ophtho: No vision changes  GI: see HPI  Derm: No rash    Medical History:  has a past medical history of Hyperlipidemia (11/14/2014) and Hypertension.    Surgical History:  has a past surgical history that includes Colonoscopy (08/10/2015); Tonsillectomy; Colonoscopy (N/A, 11/23/2021); Robot-assisted low anterior resection of colon (01/13/2022); Mobilization of splenic flexure (01/13/2022); Flexible sigmoidoscopy (01/13/2022); Colon surgery; and Colonoscopy (N/A, 10/11/2022).    Family History: family history includes Cancer in his maternal grandfather and paternal grandmother; Colon cancer in his paternal grandmother; Coronary artery disease in his maternal grandfather; Dementia in his mother; Heart attack (age of onset: 55) in his maternal grandfather..    Social History:  reports that he has never smoked. He has never used smokeless tobacco. He reports current alcohol use of about 3.0 standard drinks per week. He reports that he does not use drugs.    Review of patient's allergies indicates:   Allergen Reactions    Lisinopril Other (See Comments)     cough       Medications:   Medications Prior to Admission   Medication Sig Dispense Refill Last Dose    ascorbic acid, vitamin C, (VITAMIN C) 500 MG tablet Take 1 tablet (500 mg total) by mouth 2 (two) times daily.   5/29/2023    losartan (COZAAR) 50 MG tablet TAKE 1 TABLET(50 MG)  BY MOUTH EVERY DAY 90 tablet 1 5/30/2023    meloxicam (MOBIC) 15 MG tablet Take 15 mg by mouth once daily.   5/29/2023    multivitamin Tab Take 1 tablet by mouth once daily. 30 tablet 11 5/29/2023    tamsulosin (FLOMAX) 0.4 mg Cap TAKE 1 CAPSULE(0.4 MG) BY MOUTH EVERY DAY 30 capsule 12 5/29/2023    tamsulosin (FLOMAX) 0.4 mg Cap Take 1 capsule (0.4 mg total) by mouth once daily. 30 capsule 12 5/29/2023    albuterol (PROVENTIL HFA) 90 mcg/actuation inhaler Inhale 2 puffs into the lungs every 6 (six) hours as needed for Wheezing. Rescue 18 g 0 More than a month    ALPRAZolam (XANAX) 0.25 MG tablet Take 1 tablet (0.25 mg total) by mouth 3 (three) times daily as needed (anxiety). 60 tablet 0     sildenafiL (VIAGRA) 100 MG tablet Take 1 tablet (100 mg total) by mouth daily as needed. 30 tablet 11     sod sulf-pot chloride-mag sulf (SUTAB) 1.479-0.188- 0.225 gram tablet Take 12 tablets by mouth once daily. Take according to package instructions with indicated amount of water. 24 tablet 0        Physical Exam:    Vital Signs:   Vitals:    05/30/23 0721   BP: (!) 138/93   Pulse: 65   Resp: 17   Temp: 98.1 °F (36.7 °C)       General Appearance: Well appearing in no acute distress    Labs:  Lab Results   Component Value Date    WBC 5.59 11/02/2022    HGB 13.7 (L) 11/02/2022    HCT 43.0 11/02/2022     (L) 11/02/2022    CHOL 204 (H) 11/02/2022    TRIG 119 11/02/2022    HDL 48 11/02/2022    ALT 21 11/02/2022    AST 18 11/02/2022     03/20/2023    K 4.5 03/20/2023     03/20/2023    CREATININE 0.9 03/20/2023    BUN 17 03/20/2023    CO2 23 03/20/2023    TSH 1.608 11/02/2022    PSA 3.1 11/02/2022    INR 1.0 12/08/2020    HGBA1C 5.1 11/02/2022       I have explained the risks and benefits of this endoscopic procedure to the patient including but not limited to bleeding, inflammation, infection, perforation, and death.      Jet Garcia MD

## 2023-06-22 ENCOUNTER — TELEPHONE (OUTPATIENT)
Dept: SURGERY | Facility: CLINIC | Age: 53
End: 2023-06-22
Payer: COMMERCIAL

## 2023-06-22 ENCOUNTER — PATIENT MESSAGE (OUTPATIENT)
Dept: SURGERY | Facility: CLINIC | Age: 53
End: 2023-06-22
Payer: COMMERCIAL

## 2023-06-27 ENCOUNTER — TELEPHONE (OUTPATIENT)
Dept: SURGERY | Facility: CLINIC | Age: 53
End: 2023-06-27
Payer: COMMERCIAL

## 2023-06-27 NOTE — TELEPHONE ENCOUNTER
Contacted pt to rs appt.on 7/12. Dr. Webber jennifer be taking a leave of absence and will not be back until October.

## 2023-07-03 ENCOUNTER — TELEPHONE (OUTPATIENT)
Dept: SURGERY | Facility: CLINIC | Age: 53
End: 2023-07-03
Payer: COMMERCIAL

## 2023-07-17 RX ORDER — ALPRAZOLAM 0.25 MG/1
0.25 TABLET ORAL 3 TIMES DAILY PRN
Qty: 60 TABLET | Refills: 0 | Status: SHIPPED | OUTPATIENT
Start: 2023-07-17 | End: 2023-12-12 | Stop reason: SDUPTHER

## 2023-07-17 NOTE — TELEPHONE ENCOUNTER
No care due was identified.  Health [facility] Embedded Care Due Messages. Reference number: [id number].   7/17/2023 10:54:25 AM CDT

## 2023-08-24 NOTE — PATIENT INSTRUCTIONS
Medication:   Requested Prescriptions     Pending Prescriptions Disp Refills    zolpidem (AMBIEN) 5 MG tablet [Pharmacy Med Name: ZOLPIDEM TARTRATE 5 MG TABLET] 30 tablet      Sig: TAKE ONE TABLET BY MOUTH EVERY NIGHT AT BEDTIME AS NEEDED FOR SLEEP      Last Filled:  12/13/22 with 3 refills. Patient Phone Number: [phone] (home)     Last appt: 8/11/2023   Next appt: 12/11/2023    Last OARRS: No flowsheet data found.   PDMP Monitoring:    Last PDMP Madeleine Chua as Reviewed [facility]):  Review User Review Instant Review Result          Preferred Pharmacy:   [facility] [id number] - [address] [phone]  [address] [address]  Phone: [phone] Fax: [phone]    [address] [place] [address] [phone] - F [phone]  [facility] [address]  Phone: [phone] Fax: [phone] [place] [address] Drive, [address] [place] [address] [phone] Flor Song [phone]  [address] [place] [address] 13[address] [facility] [address]  Phone: [phone] Fax: [phone]    [facility], [address] [facility] [phone] Flor Tristanels [phone]  [address]  [address] [address]  Phone: [phone] Fax: [phone], 1317 [facility] - [address] [facility] [phone] [facility] [phone] [address] [facility] 1317 [facility] [address]  Phone: [phone] Fax: [phone] Return to clinic in 3 months or sooner if needed

## 2023-09-13 ENCOUNTER — OFFICE VISIT (OUTPATIENT)
Dept: SURGERY | Facility: CLINIC | Age: 53
End: 2023-09-13
Payer: COMMERCIAL

## 2023-09-13 VITALS
WEIGHT: 269.38 LBS | DIASTOLIC BLOOD PRESSURE: 92 MMHG | RESPIRATION RATE: 16 BRPM | HEIGHT: 72 IN | HEART RATE: 66 BPM | SYSTOLIC BLOOD PRESSURE: 132 MMHG | BODY MASS INDEX: 36.49 KG/M2

## 2023-09-13 DIAGNOSIS — Z85.038 ENCOUNTER FOR FOLLOW-UP SURVEILLANCE OF COLON CANCER: Primary | ICD-10-CM

## 2023-09-13 DIAGNOSIS — Z08 ENCOUNTER FOR FOLLOW-UP SURVEILLANCE OF COLON CANCER: Primary | ICD-10-CM

## 2023-09-13 PROCEDURE — 99214 OFFICE O/P EST MOD 30 MIN: CPT | Mod: S$GLB,,, | Performed by: COLON & RECTAL SURGERY

## 2023-09-13 PROCEDURE — 3008F PR BODY MASS INDEX (BMI) DOCUMENTED: ICD-10-PCS | Mod: CPTII,S$GLB,, | Performed by: COLON & RECTAL SURGERY

## 2023-09-13 PROCEDURE — 3008F BODY MASS INDEX DOCD: CPT | Mod: CPTII,S$GLB,, | Performed by: COLON & RECTAL SURGERY

## 2023-09-13 PROCEDURE — 1159F MED LIST DOCD IN RCRD: CPT | Mod: CPTII,S$GLB,, | Performed by: COLON & RECTAL SURGERY

## 2023-09-13 PROCEDURE — 99999 PR PBB SHADOW E&M-EST. PATIENT-LVL III: CPT | Mod: PBBFAC,,, | Performed by: COLON & RECTAL SURGERY

## 2023-09-13 PROCEDURE — 4010F ACE/ARB THERAPY RXD/TAKEN: CPT | Mod: CPTII,S$GLB,, | Performed by: COLON & RECTAL SURGERY

## 2023-09-13 PROCEDURE — 99214 PR OFFICE/OUTPT VISIT, EST, LEVL IV, 30-39 MIN: ICD-10-PCS | Mod: S$GLB,,, | Performed by: COLON & RECTAL SURGERY

## 2023-09-13 PROCEDURE — 3080F PR MOST RECENT DIASTOLIC BLOOD PRESSURE >= 90 MM HG: ICD-10-PCS | Mod: CPTII,S$GLB,, | Performed by: COLON & RECTAL SURGERY

## 2023-09-13 PROCEDURE — 3080F DIAST BP >= 90 MM HG: CPT | Mod: CPTII,S$GLB,, | Performed by: COLON & RECTAL SURGERY

## 2023-09-13 PROCEDURE — 99999 PR PBB SHADOW E&M-EST. PATIENT-LVL III: ICD-10-PCS | Mod: PBBFAC,,, | Performed by: COLON & RECTAL SURGERY

## 2023-09-13 PROCEDURE — 3075F SYST BP GE 130 - 139MM HG: CPT | Mod: CPTII,S$GLB,, | Performed by: COLON & RECTAL SURGERY

## 2023-09-13 PROCEDURE — 3075F PR MOST RECENT SYSTOLIC BLOOD PRESS GE 130-139MM HG: ICD-10-PCS | Mod: CPTII,S$GLB,, | Performed by: COLON & RECTAL SURGERY

## 2023-09-13 PROCEDURE — 1159F PR MEDICATION LIST DOCUMENTED IN MEDICAL RECORD: ICD-10-PCS | Mod: CPTII,S$GLB,, | Performed by: COLON & RECTAL SURGERY

## 2023-09-13 PROCEDURE — 4010F PR ACE/ARB THEARPY RXD/TAKEN: ICD-10-PCS | Mod: CPTII,S$GLB,, | Performed by: COLON & RECTAL SURGERY

## 2023-09-17 NOTE — PROGRESS NOTES
HPI:  Jim Brown is a 53 y.o. male with history of stage I colon CA     1/13/2022 robotic LAR for rectosigmoid cancer    Feels well.  No pain.  BMs regular.  No BRB  Appetite good.  No wt loss.  Activity level - normal.      Past Medical History:   Diagnosis Date    Hyperlipidemia 11/14/2014 11/13/2014:     Hypertension         Past Surgical History:   Procedure Laterality Date    COLON SURGERY      COLONOSCOPY  08/10/2015    COLONOSCOPY N/A 11/23/2021    Procedure: COLONOSCOPY;  Surgeon: GM Webber MD;  Location: [facility] (4TH FLR);  Service: Endoscopy;  Laterality: N/A;  Covid test 11/23 [place], instructions emailed to [email or web address]    COLONOSCOPY N/A 10/11/2022    Procedure: COLONOSCOPY;  Surgeon: GM Webber MD;  Location: [facility] (4TH FLR);  Service: Endoscopy;  Laterality: N/A;  Rapid, instr. via mail & portal, clears 4 hrs prior, am prep -PC    ENDOSCOPIC ULTRASOUND OF UPPER GASTROINTESTINAL TRACT N/A 5/30/2023    Procedure: ULTRASOUND, UPPER GI TRACT, ENDOSCOPIC;  Surgeon: Jet Garcia MD;  Location: [facility] ENDO (2ND FLR);  Service: Endoscopy;  Laterality: N/A;  Saw Dr. Bustamante in clinic, refused Moon and did not want to wait until after Dr. Bustamante's vacation to schedule wtih him-DS  5/22/23-Instructions via portal-DS    FLEXIBLE SIGMOIDOSCOPY  01/13/2022    Procedure: SIGMOIDOSCOPY, FLEXIBLE;  Surgeon: GM Webber MD;  Location: [facility] OR 2ND FLR;  Service: Colon and Rectal;;    MOBILIZATION OF SPLENIC FLEXURE  01/13/2022    Procedure: MOBILIZATION, SPLENIC FLEXURE;  Surgeon: GM Webber MD;  Location: [facility] OR 2ND FLR;  Service: Colon and Rectal;;    ROBOT-ASSISTED LOW ANTERIOR RESECTION OF COLON  01/13/2022    Procedure: ROBOTIC RESECTION, COLON, LOW ANTERIOR;  Surgeon: GM Webber MD;  Location: [facility] OR 2ND FLR;  Service: Colon and Rectal;;    TONSILLECTOMY         Review of patient's allergies indicates:   Allergen Reactions    Lisinopril Other  (See Comments)     cough       Family History   Problem Relation Age of Onset    Dementia Mother     Cancer Maternal Grandfather     Coronary artery disease Maternal Grandfather     Heart attack Maternal Grandfather 55    Colon cancer Paternal Grandmother     Cancer Paternal Grandmother         colon cancer    Esophageal cancer Neg Hx        Social History     Socioeconomic History    Marital status:    Tobacco Use    Smoking status: Never    Smokeless tobacco: Never   Substance and Sexual Activity    Alcohol use: Yes     Alcohol/week: 3.0 standard drinks of alcohol     Types: 3 Drinks containing 0.5 oz of alcohol per week     Comment: 2-3 on weekends    Drug use: Never    Sexual activity: Yes     Partners: Female       ROS:  GENERAL: No fever, chills, fatigability or weight loss.  Integument: No rashes, redness, icterus  CHEST: Denies MARIE, cyanosis, wheezing, cough and sputum production.  CARDIOVASCULAR: Denies chest pain, PND, orthopnea or reduced exercise tolerance.  GI: Denies abd pain, dysphagia, nausea, vomiting, no hematemesis   : Denies burning on urination, no hematuria, no bacteriuria  MSK: No deformities, swelling, joint pain swelling  Neurologic: No HAs, seizures, weakness, paresthesias, gait problems    PE:  General appearance healthy  BP (!) 132/92 (BP Location: Left arm, Patient Position: Sitting, BP Method: Large (Automatic))   Pulse 66   Resp 16   Ht 6' (1.829 m)   Wt 122.2 kg (269 lb 6.4 oz)   BMI 36.54 kg/m²   Sclera/ Skin anicteric  LN none palpable  AT NC EOMI  Neck supple trachea midline   Chest symmetric, nl excursion, no retractions, breathing comfortably  Abdomen wounds healed.    ND soft NT.  no masses, no organomegaly  EXT - no CCE  Neuro:  Mood/ affect nl, alert and oriented x 3, moves all ext's, gait nl    Assessment:  History of stage I rectosigmoid CA s/p resection without evidence of recurrence    Plan:  Discussed with pt  No need for surveillance imaging or CEA  OV in 3  months  Colonoscopy in 1 year

## 2023-09-24 NOTE — PROGRESS NOTES
[facility] Note    Chief Complaint      Chief Complaint   Patient presents with    Follow-up       History of Present Illness      Jim Brown is a 53 y.o. M with colon Ca, BPH, Gout, HTN, Anxiety, Male ED presents to fu chronic issues. Last visit - 3/20/23      Borderline HLD - Cholesterol looked slightly high. He does not require medication for this at this time but I do recommend you follow a low cholesterol diet and exercise. Rec he can visit the American heart association website at heart.org for further info on a low cholesterol diet.      Thyroid nodule -   Thyroid U/S - 4/3/23 - 1.7 cm TI-RADS 2 nodule in the right lobe of the thyroid.  No FNA or surveillance is advised. Will repeat U/S in 1 yr (4/2024).       Pancreatic cyst -  seen on recent CT C/A/P - 2/1/23. Fu with GI, , for further eval. EUS - 5/30/23 - A cyst was found in the subdiaphragmatic area of the liver and measured 17 mm by 10 mm. Will cont to monitor.      Chronic cough -  Resolved.      Anemia -  H/H - 13.7/43 - stable. Folic acid was normal and Vitamin B12 was low normal at 439. Iron studies were normal.       Inc PSA - PSA was normal at 3.1 but inc from 2.5 -Fu with , Dr. Johnson.  Planning for repeat in May. MRI Prostate - 12/29/22 - BPH and peripheral zone heterogeneity.  No focal lesion concerning for prostate cancer.     BPH - Fu by Dr. Alex BARRAZA. Pt on Flomax.  Doing ok.  No dizziness.      Thrombocytopenia - 128 -11/2/22- Stable since 2010.      Stage I Colon Ca - Fu by Dr. Webber.  CEA < 1.7  - 1/11/23.  S/p partial colectomy - 1/2022. CT C/A/P - 2/1/23 -Postoperative change related to low anterior resection and splenic flexure mobilization for colon cancer resection.   no evidence of local disease recurrence. Prominent hypoattenuating focus within the head of the pancreas, which may represent small cystic lesion versus normal intra-abdominal fat.No evidence of metastatic disease within the chest,  abdomen, or pelvis. Small right thyroid lobe nodule, which may be better evaluated with ultrasound. Few solid pulmonary nodules, unchanged from CT 12/10/2021. No sig. coronary atherosclerosis.   Has fu with Dr. Webber in Dec.      Gout - Pt never started Allopurinol 300 mg/d per Podiatry, Dr. Simon.  Has not yet started this.  Had c/o purple, swelling, pain to Left 2nd toe. No trauma. Xray - no sign of Fracture.  Pt has not been taking Meloxicam 15 mg/d  prn per Podiatry. Rec caution given HTN, and prev partial colectomy.        HTN - Pt controlled on Losartan 50 mg/d.  He missed his meds the past 2 days.  BP is elevated today. Prev had cough on Lisinopril.      Anxiety - Pt on Xanax daily-BID prn. Rarely needs this when stressed and caring for his Mom. Rx when dx with Colon ca. 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 Has been caring for his Mom with dementia.  It gets frustrating. He does not wish to be on Zoloft.  He was on it in the past and felt it affected his sex drive and caused anorgasmia. 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 Could try Buspirone in future.      Male ED - Pt on Viagra prn.      HCM - Flu - none - declines;  Tdap - 11/12/14;  PCV 13 - none;  PVX 23 - none;  Shingrix - none - due - defers;  COVID - 19 Vaccine #1 none -declines; Hep C Screen - 11/2/22 - neg.; HIV Screen - none - declines; C-scope 10/11/22 - + polyps - repeat 3 yrs;  PSA - 3.1 - 11/2/22; Prev PCP - Dr. Myers; CRS - Dr. Webber;   - Dr. Johnson; Podiatrist - Dr. Simon; Well visit - 11/4/21     Patient Care Team:  Viviana Bee MD as PCP - General (Internal Medicine)  Joanie Mena LPN (Inactive) as Licensed Practical Nurse (Internal Medicine)  Joanie Mena LPN (Inactive) as Care Coordinator (Internal Medicine)  Jose Juan Simon DPM (Podiatry)     Health Maintenance:  Immunization History   Administered Date(s) Administered    Tdap 11/12/2014      Health Maintenance    Topic Date Due    Shingles Vaccine (1 of 2) Never done    TETANUS VACCINE  11/12/2024    Colorectal Cancer Screening  10/11/2025    Lipid Panel  11/02/2027    Hepatitis C Screening  Completed        Past Medical History:  Past Medical History:   Diagnosis Date    COVID-19     12/2020    Hyperlipidemia 11/14/2014 11/13/2014:     Hypertension        Past Surgical History:   has a past surgical history that includes Colonoscopy (08/10/2015); Tonsillectomy; Colonoscopy (N/A, 11/23/2021); Robot-assisted low anterior resection of colon (01/13/2022); Mobilization of splenic flexure (01/13/2022); Flexible sigmoidoscopy (01/13/2022); Colon surgery; Colonoscopy (N/A, 10/11/2022); and Endoscopic ultrasound of upper gastrointestinal tract (N/A, 5/30/2023).    Family History:  family history includes Cancer in his maternal grandfather and paternal grandmother; Colon cancer in his paternal grandmother; Coronary artery disease in his maternal grandfather; Dementia in his mother; Heart attack (age of onset: 55) in his maternal grandfather.     Social History:  Social History     Tobacco Use    Smoking status: Never    Smokeless tobacco: Never   Substance Use Topics    Alcohol use: Yes     Alcohol/week: 3.0 standard drinks of alcohol     Types: 3 Drinks containing 0.5 oz of alcohol per week     Comment: 2-3 on weekends    Drug use: Never       Review of Systems   Constitutional:  Negative for chills, diaphoresis and fever.   HENT:  Negative for nasal congestion, rhinorrhea and sore throat.    Respiratory:  Negative for cough and shortness of breath.    Cardiovascular:  Negative for chest pain and palpitations.   Gastrointestinal:  Positive for diarrhea. Negative for abdominal pain, blood in stool, constipation, nausea and vomiting.        Yesterday - had 2-3 watery stools yesterday   Endocrine: Negative for cold intolerance and heat intolerance.   Genitourinary:         No hesitancy and no nocturia.     Musculoskeletal:   "Positive for arthralgias. Negative for myalgias.        Left knee pain s/p fall. Hurts to kneel on Left knee.  Less swollen and tender but still bothers him. Plans to fu with Ortho. Rec ice x 20 min intervals.    Neurological:  Negative for dizziness and headaches.   Psychiatric/Behavioral:  Negative for dysphoric mood. The patient is nervous/anxious.         "A little bit".         Medications:    Current Outpatient Medications:     ALPRAZolam (XANAX) 0.25 MG tablet, Take 1 tablet (0.25 mg total) by mouth 3 (three) times daily as needed (anxiety)., Disp: 60 tablet, Rfl: 0    ascorbic acid, vitamin C, (VITAMIN C) 500 MG tablet, Take 1 tablet (500 mg total) by mouth 2 (two) times daily., Disp:  , Rfl:     meloxicam (MOBIC) 15 MG tablet, Take 15 mg by mouth once daily., Disp: , Rfl:     multivitamin Tab, Take 1 tablet by mouth once daily., Disp: 30 tablet, Rfl: 11    sildenafiL (VIAGRA) 100 MG tablet, Take 1 tablet (100 mg total) by mouth daily as needed., Disp: 30 tablet, Rfl: 11    tamsulosin (FLOMAX) 0.4 mg Cap, TAKE 1 CAPSULE(0.4 MG) BY MOUTH EVERY DAY, Disp: 30 capsule, Rfl: 12    albuterol (PROVENTIL HFA) 90 mcg/actuation inhaler, Inhale 2 puffs into the lungs every 6 (six) hours as needed for Wheezing. Rescue, Disp: 18 g, Rfl: 1    losartan (COZAAR) 50 MG tablet, Take 1 tablet (50 mg total) by mouth once daily., Disp: 90 tablet, Rfl: 1    sod sulf-pot chloride-mag sulf (SUTAB) 1.479-0.188- 0.225 gram tablet, Take 12 tablets by mouth once daily. Take according to package instructions with indicated amount of water. (Patient not taking: Reported on 9/25/2023), Disp: 24 tablet, Rfl: 0    tamsulosin (FLOMAX) 0.4 mg Cap, Take 1 capsule (0.4 mg total) by mouth once daily. (Patient not taking: Reported on 9/25/2023), Disp: 30 capsule, Rfl: 12  No current facility-administered medications for this visit.    Facility-Administered Medications Ordered in Other Visits:     gabapentin capsule 300 mg, 300 mg, Oral, TID, " Didi Claros NP, 300 mg at 01/18/22 0849     Allergies:  Review of patient's allergies indicates:   Allergen Reactions    Lisinopril Other (See Comments)     cough       Physical Exam      Vital Signs  Temp: 99.3 °F (37.4 °C)  Temp Source: Oral  Pulse: 63  Resp: 16  SpO2: 97 %  BP: 122/86  BP Location: Right arm  Patient Position: Sitting  Pain Score: 0-No pain  Height and Weight  Height: 6' (182.9 cm)  Weight: 122.8 kg (270 lb 11.6 oz)  BSA (Calculated - sq m): 2.5 sq meters  BMI (Calculated): 36.7  Weight in (lb) to have BMI = 25: 183.9      Patient Position: Sitting      Physical Exam  Vitals reviewed.   Constitutional:       General: He is not in acute distress.     Appearance: Normal appearance. He is not ill-appearing, toxic-appearing or diaphoretic.   HENT:      Head: Normocephalic and atraumatic.      Right Ear: Tympanic membrane normal.      Left Ear: Tympanic membrane normal.      Mouth/Throat:      Mouth: Mucous membranes are moist.      Pharynx: No oropharyngeal exudate or posterior oropharyngeal erythema.   Eyes:      Extraocular Movements: Extraocular movements intact.      Conjunctiva/sclera: Conjunctivae normal.      Pupils: Pupils are equal, round, and reactive to light.   Neck:      Vascular: No carotid bruit.   Cardiovascular:      Rate and Rhythm: Normal rate and regular rhythm.      Pulses: Normal pulses.      Heart sounds: Normal heart sounds.   Pulmonary:      Effort: No respiratory distress.      Breath sounds: Normal breath sounds. No wheezing.   Abdominal:      General: Bowel sounds are normal. There is no distension.      Palpations: Abdomen is soft.      Tenderness: There is no abdominal tenderness. There is no guarding or rebound.   Musculoskeletal:         General: Normal range of motion.      Cervical back: Neck supple. No tenderness.      Comments: Abrasion to Left knee, No erythema.  TTP over Left patella.    Skin:     General: Skin is warm and dry.   Neurological:       General: No focal deficit present.      Mental Status: He is alert and oriented to person, place, and time.   Psychiatric:         Mood and Affect: Mood normal.         Behavior: Behavior normal.          Laboratory:  CBC:  Recent Labs   Lab 01/14/22  0348 01/18/22  0513 11/02/22  0912   WBC 8.17 5.77 5.59   RBC 4.06 L 3.59 L 4.38 L   Hemoglobin 13.0 L 11.5 L 13.7 L   Hematocrit 40.0 34.2 L 43.0   Platelets 141 L 150 128 L   MCV 99 H 95 98   MCH 32.0 H 32.0 H 31.3 H   MCHC 32.5 33.6 31.9 L       CMP:  Recent Labs   Lab 12/30/20  1238 10/29/21  0946 01/13/22  1351 11/02/22  0912 03/20/23  1115   Glucose 96 107   < > 102 95   Calcium 9.5 9.9   < > 9.3 9.4   Albumin 3.9 4.1  --  3.9  --    Total Protein 7.4 7.4  --  6.7  --    Sodium 140 138   < > 140 139   Potassium 4.4 4.6   < > 4.4 4.5   CO2 24 30 H   < > 26 23   Chloride 105 103   < > 105 104   BUN 14 17   < > 17 17   Creatinine 0.9 1.0   < > 0.9 0.9   Alkaline Phosphatase 42 L 35 L  --  26 L  --    ALT 35 21  --  21  --    AST 21 20  --  18  --    Total Bilirubin 0.3 0.8  --  0.6  --     < > = values in this interval not displayed.          LIPIDS:  Recent Labs   Lab 10/29/21  0946 11/02/22  0912   TSH  --  1.608   HDL 59 48   Cholesterol 220 H 204 H   Triglycerides 156 H 119   LDL Cholesterol 129.8 132.2   HDL/Cholesterol Ratio 26.8 23.5   Non-HDL Cholesterol 161 156   Total Cholesterol/HDL Ratio 3.7 4.3       TSH:  Recent Labs   Lab 11/02/22  0912   TSH 1.608       A1C:  Recent Labs   Lab 12/07/20  1639 11/02/22  0912   Hemoglobin A1C 5.2 5.1          Other:   Recent Labs   Lab 12/07/20  1639 12/09/20  0303 03/20/23  1115   Vit D, 25-Hydroxy 49  --   --    Vitamin B-12  --   --  439   Ferritin  --    < > 232   Iron  --   --  117   Transferrin  --   --  238   TIBC  --   --  352   Saturated Iron  --   --  33    < > = values in this interval not displayed.     Recent Labs   Lab 10/29/21  0946 11/02/22  0912   PSA, Screen 2.5 3.1   Hepatitis C Ab  --  Non-reactive        Assessment/Plan     Jim Brown is a 53 y.o.male with:    Normal physical exam, routine  -     Comprehensive Metabolic Panel; Future; Expected date: 11/03/2023  -     CBC Auto Differential; Future; Expected date: 11/03/2023  -     TSH; Future; Expected date: 11/03/2023  -     Hemoglobin A1C; Future; Expected date: 11/03/2023  - Performed today.  Will check Basic labs.  RTC in 1 yr for fu or sooner if needed    Hypertension, unspecified type  -     losartan (COZAAR) 50 MG tablet; Take 1 tablet (50 mg total) by mouth once daily.  Dispense: 90 tablet; Refill: 1  - Controlled.  Cont current.     Hyperlipidemia, unspecified hyperlipidemia type  -     Lipid Panel; Future; Expected date: 11/03/2023  - Controlled.  Cont current.     Thrombocytopenia  - Stable.  Cont current regimen.    Pancreas cyst  - Stable.       Thyroid nodule  - Stable.  Repeat FLP in Apr.     Normocytic anemia  - Stable.  Cont current regimen.    Malignant neoplasm of sigmoid colon  - Stable.  Cont current regimen.    Benign prostatic hyperplasia, unspecified whether lower urinary tract symptoms present  - Stable. Fu by . Repeat PSA with next labs.     Anxiety  - Stable.  Cont current regimen.    Class 2 obesity due to excess calories with body mass index (BMI) of 36.0 to 36.9 in adult, unspecified whether serious comorbidity present  -     Hemoglobin A1C; Future; Expected date: 11/03/2023  - Repeat Ha1c.      Gout, unspecified cause, unspecified chronicity, unspecified site  -     URIC ACID; Future; Expected date: 11/03/2023  - Check UA level.     H/O wheezing  -     albuterol (PROVENTIL HFA) 90 mcg/actuation inhaler; Inhale 2 puffs into the lungs every 6 (six) hours as needed for Wheezing. Rescue  Dispense: 18 g; Refill: 1    Prostate cancer screening  -     PSA, Screening; Future; Expected date: 11/03/2023         Chronic conditions status updated as per HPI.  Other than changes above, cont current medications and maintain follow up  with specialists.   Follow up in about 6 months (around 3/25/2024) for fu chronic issues or sooner if needed.      Viviana Bee MD  [facility]

## 2023-09-25 ENCOUNTER — OFFICE VISIT (OUTPATIENT)
Dept: PRIMARY CARE CLINIC | Facility: CLINIC | Age: 53
End: 2023-09-25
Payer: COMMERCIAL

## 2023-09-25 VITALS
BODY MASS INDEX: 36.67 KG/M2 | TEMPERATURE: 99 F | RESPIRATION RATE: 16 BRPM | SYSTOLIC BLOOD PRESSURE: 122 MMHG | DIASTOLIC BLOOD PRESSURE: 86 MMHG | HEIGHT: 72 IN | WEIGHT: 270.75 LBS | HEART RATE: 63 BPM | OXYGEN SATURATION: 97 %

## 2023-09-25 DIAGNOSIS — D64.9 NORMOCYTIC ANEMIA: ICD-10-CM

## 2023-09-25 DIAGNOSIS — E04.1 THYROID NODULE: ICD-10-CM

## 2023-09-25 DIAGNOSIS — I10 HYPERTENSION, UNSPECIFIED TYPE: ICD-10-CM

## 2023-09-25 DIAGNOSIS — Z00.00 NORMAL PHYSICAL EXAM, ROUTINE: Primary | ICD-10-CM

## 2023-09-25 DIAGNOSIS — F41.9 ANXIETY: ICD-10-CM

## 2023-09-25 DIAGNOSIS — N40.0 BENIGN PROSTATIC HYPERPLASIA, UNSPECIFIED WHETHER LOWER URINARY TRACT SYMPTOMS PRESENT: ICD-10-CM

## 2023-09-25 DIAGNOSIS — D69.6 THROMBOCYTOPENIA: ICD-10-CM

## 2023-09-25 DIAGNOSIS — E78.5 HYPERLIPIDEMIA, UNSPECIFIED HYPERLIPIDEMIA TYPE: ICD-10-CM

## 2023-09-25 DIAGNOSIS — Z12.5 PROSTATE CANCER SCREENING: ICD-10-CM

## 2023-09-25 DIAGNOSIS — Z87.898 H/O WHEEZING: ICD-10-CM

## 2023-09-25 DIAGNOSIS — C18.7 MALIGNANT NEOPLASM OF SIGMOID COLON: ICD-10-CM

## 2023-09-25 DIAGNOSIS — K86.2 PANCREAS CYST: ICD-10-CM

## 2023-09-25 DIAGNOSIS — M10.9 GOUT, UNSPECIFIED CAUSE, UNSPECIFIED CHRONICITY, UNSPECIFIED SITE: ICD-10-CM

## 2023-09-25 DIAGNOSIS — E66.09 CLASS 2 OBESITY DUE TO EXCESS CALORIES WITH BODY MASS INDEX (BMI) OF 36.0 TO 36.9 IN ADULT, UNSPECIFIED WHETHER SERIOUS COMORBIDITY PRESENT: ICD-10-CM

## 2023-09-25 PROBLEM — E66.812 CLASS 2 OBESITY DUE TO EXCESS CALORIES WITH BODY MASS INDEX (BMI) OF 36.0 TO 36.9 IN ADULT: Status: ACTIVE | Noted: 2023-09-25

## 2023-09-25 PROCEDURE — 1159F MED LIST DOCD IN RCRD: CPT | Mod: CPTII,S$GLB,, | Performed by: INTERNAL MEDICINE

## 2023-09-25 PROCEDURE — 99396 PR PREVENTIVE VISIT,EST,40-64: ICD-10-PCS | Mod: S$GLB,,, | Performed by: INTERNAL MEDICINE

## 2023-09-25 PROCEDURE — 1159F PR MEDICATION LIST DOCUMENTED IN MEDICAL RECORD: ICD-10-PCS | Mod: CPTII,S$GLB,, | Performed by: INTERNAL MEDICINE

## 2023-09-25 PROCEDURE — 3079F PR MOST RECENT DIASTOLIC BLOOD PRESSURE 80-89 MM HG: ICD-10-PCS | Mod: CPTII,S$GLB,, | Performed by: INTERNAL MEDICINE

## 2023-09-25 PROCEDURE — 3074F SYST BP LT 130 MM HG: CPT | Mod: CPTII,S$GLB,, | Performed by: INTERNAL MEDICINE

## 2023-09-25 PROCEDURE — 99999 PR PBB SHADOW E&M-EST. PATIENT-LVL IV: ICD-10-PCS | Mod: PBBFAC,,, | Performed by: INTERNAL MEDICINE

## 2023-09-25 PROCEDURE — 4010F PR ACE/ARB THEARPY RXD/TAKEN: ICD-10-PCS | Mod: CPTII,S$GLB,, | Performed by: INTERNAL MEDICINE

## 2023-09-25 PROCEDURE — 3008F BODY MASS INDEX DOCD: CPT | Mod: CPTII,S$GLB,, | Performed by: INTERNAL MEDICINE

## 2023-09-25 PROCEDURE — 3074F PR MOST RECENT SYSTOLIC BLOOD PRESSURE < 130 MM HG: ICD-10-PCS | Mod: CPTII,S$GLB,, | Performed by: INTERNAL MEDICINE

## 2023-09-25 PROCEDURE — 3008F PR BODY MASS INDEX (BMI) DOCUMENTED: ICD-10-PCS | Mod: CPTII,S$GLB,, | Performed by: INTERNAL MEDICINE

## 2023-09-25 PROCEDURE — 99999 PR PBB SHADOW E&M-EST. PATIENT-LVL IV: CPT | Mod: PBBFAC,,, | Performed by: INTERNAL MEDICINE

## 2023-09-25 PROCEDURE — 3079F DIAST BP 80-89 MM HG: CPT | Mod: CPTII,S$GLB,, | Performed by: INTERNAL MEDICINE

## 2023-09-25 PROCEDURE — 99396 PREV VISIT EST AGE 40-64: CPT | Mod: S$GLB,,, | Performed by: INTERNAL MEDICINE

## 2023-09-25 PROCEDURE — 4010F ACE/ARB THERAPY RXD/TAKEN: CPT | Mod: CPTII,S$GLB,, | Performed by: INTERNAL MEDICINE

## 2023-09-25 RX ORDER — LOSARTAN POTASSIUM 50 MG/1
50 TABLET ORAL DAILY
Qty: 90 TABLET | Refills: 1 | Status: SHIPPED | OUTPATIENT
Start: 2023-09-25 | End: 2023-10-16

## 2023-09-25 RX ORDER — ALBUTEROL SULFATE 90 UG/1
2 AEROSOL, METERED RESPIRATORY (INHALATION) EVERY 6 HOURS PRN
Qty: 18 G | Refills: 1 | Status: SHIPPED | OUTPATIENT
Start: 2023-09-25 | End: 2024-09-24

## 2023-10-16 DIAGNOSIS — I10 HYPERTENSION, UNSPECIFIED TYPE: ICD-10-CM

## 2023-10-16 RX ORDER — LOSARTAN POTASSIUM 50 MG/1
50 TABLET ORAL
Qty: 90 TABLET | Refills: 1 | Status: SHIPPED | OUTPATIENT
Start: 2023-10-16 | End: 2024-03-18 | Stop reason: SDUPTHER

## 2023-10-16 NOTE — TELEPHONE ENCOUNTER
No care due was identified.  [facility] Embedded Care Due Messages. Reference number: [id number].   10/16/2023 3:25:05 AM CDT

## 2023-10-16 NOTE — TELEPHONE ENCOUNTER
Refill Decision Note   Jim Stephanie  is requesting a refill authorization.  Brief Assessment and Rationale for Refill:  Approve     Medication Therapy Plan:         Comments:     Note composed:4:44 PM 10/16/2023

## 2023-11-06 ENCOUNTER — LAB VISIT (OUTPATIENT)
Dept: LAB | Facility: HOSPITAL | Age: 53
End: 2023-11-06
Attending: INTERNAL MEDICINE
Payer: COMMERCIAL

## 2023-11-06 DIAGNOSIS — M10.9 GOUT, UNSPECIFIED CAUSE, UNSPECIFIED CHRONICITY, UNSPECIFIED SITE: ICD-10-CM

## 2023-11-06 DIAGNOSIS — Z00.00 NORMAL PHYSICAL EXAM, ROUTINE: ICD-10-CM

## 2023-11-06 DIAGNOSIS — E66.09 CLASS 2 OBESITY DUE TO EXCESS CALORIES WITH BODY MASS INDEX (BMI) OF 36.0 TO 36.9 IN ADULT, UNSPECIFIED WHETHER SERIOUS COMORBIDITY PRESENT: ICD-10-CM

## 2023-11-06 DIAGNOSIS — E78.5 HYPERLIPIDEMIA, UNSPECIFIED HYPERLIPIDEMIA TYPE: ICD-10-CM

## 2023-11-06 DIAGNOSIS — Z12.5 PROSTATE CANCER SCREENING: ICD-10-CM

## 2023-11-06 LAB
ALBUMIN SERPL BCP-MCNC: 4.1 G/DL (ref 3.5–5.2)
ALP SERPL-CCNC: 30 U/L (ref 55–135)
ALT SERPL W/O P-5'-P-CCNC: 21 U/L (ref 10–44)
ANION GAP SERPL CALC-SCNC: 12 MMOL/L (ref 8–16)
AST SERPL-CCNC: 20 U/L (ref 10–40)
BASOPHILS # BLD AUTO: 0.04 K/UL (ref 0–0.2)
BASOPHILS NFR BLD: 0.6 % (ref 0–1.9)
BILIRUB SERPL-MCNC: 0.5 MG/DL (ref 0.1–1)
BUN SERPL-MCNC: 23 MG/DL (ref 6–20)
CALCIUM SERPL-MCNC: 9.3 MG/DL (ref 8.7–10.5)
CHLORIDE SERPL-SCNC: 105 MMOL/L (ref 95–110)
CHOLEST SERPL-MCNC: 218 MG/DL (ref 120–199)
CHOLEST/HDLC SERPL: 3.9 {RATIO} (ref 2–5)
CO2 SERPL-SCNC: 24 MMOL/L (ref 23–29)
COMPLEXED PSA SERPL-MCNC: 2.6 NG/ML (ref 0–4)
CREAT SERPL-MCNC: 1.1 MG/DL (ref 0.5–1.4)
DIFFERENTIAL METHOD: ABNORMAL
EOSINOPHIL # BLD AUTO: 0.5 K/UL (ref 0–0.5)
EOSINOPHIL NFR BLD: 7.3 % (ref 0–8)
ERYTHROCYTE [DISTWIDTH] IN BLOOD BY AUTOMATED COUNT: 11.5 % (ref 11.5–14.5)
EST. GFR  (NO RACE VARIABLE): >60 ML/MIN/1.73 M^2
ESTIMATED AVG GLUCOSE: 100 MG/DL (ref 68–131)
GLUCOSE SERPL-MCNC: 98 MG/DL (ref 70–110)
HBA1C MFR BLD: 5.1 % (ref 4–5.6)
HCT VFR BLD AUTO: 44.5 % (ref 40–54)
HDLC SERPL-MCNC: 56 MG/DL (ref 40–75)
HDLC SERPL: 25.7 % (ref 20–50)
HGB BLD-MCNC: 14.8 G/DL (ref 14–18)
IMM GRANULOCYTES # BLD AUTO: 0.03 K/UL (ref 0–0.04)
IMM GRANULOCYTES NFR BLD AUTO: 0.4 % (ref 0–0.5)
LDLC SERPL CALC-MCNC: 147.6 MG/DL (ref 63–159)
LYMPHOCYTES # BLD AUTO: 1.9 K/UL (ref 1–4.8)
LYMPHOCYTES NFR BLD: 29 % (ref 18–48)
MCH RBC QN AUTO: 32.2 PG (ref 27–31)
MCHC RBC AUTO-ENTMCNC: 33.3 G/DL (ref 32–36)
MCV RBC AUTO: 97 FL (ref 82–98)
MONOCYTES # BLD AUTO: 0.5 K/UL (ref 0.3–1)
MONOCYTES NFR BLD: 7.9 % (ref 4–15)
NEUTROPHILS # BLD AUTO: 3.7 K/UL (ref 1.8–7.7)
NEUTROPHILS NFR BLD: 54.8 % (ref 38–73)
NONHDLC SERPL-MCNC: 162 MG/DL
NRBC BLD-RTO: 0 /100 WBC
PLATELET # BLD AUTO: 142 K/UL (ref 150–450)
PLATELET BLD QL SMEAR: ABNORMAL
PMV BLD AUTO: 12.5 FL (ref 9.2–12.9)
POTASSIUM SERPL-SCNC: 4.4 MMOL/L (ref 3.5–5.1)
PROT SERPL-MCNC: 7.3 G/DL (ref 6–8.4)
RBC # BLD AUTO: 4.59 M/UL (ref 4.6–6.2)
SODIUM SERPL-SCNC: 141 MMOL/L (ref 136–145)
TRIGL SERPL-MCNC: 72 MG/DL (ref 30–150)
TSH SERPL DL<=0.005 MIU/L-ACNC: 1.49 UIU/ML (ref 0.4–4)
URATE SERPL-MCNC: 8.4 MG/DL (ref 3.4–7)
WBC # BLD AUTO: 6.7 K/UL (ref 3.9–12.7)

## 2023-11-06 PROCEDURE — 84443 ASSAY THYROID STIM HORMONE: CPT | Performed by: INTERNAL MEDICINE

## 2023-11-06 PROCEDURE — 84153 ASSAY OF PSA TOTAL: CPT | Performed by: INTERNAL MEDICINE

## 2023-11-06 PROCEDURE — 80053 COMPREHEN METABOLIC PANEL: CPT | Performed by: INTERNAL MEDICINE

## 2023-11-06 PROCEDURE — 85025 COMPLETE CBC W/AUTO DIFF WBC: CPT | Performed by: INTERNAL MEDICINE

## 2023-11-06 PROCEDURE — 36415 COLL VENOUS BLD VENIPUNCTURE: CPT | Mod: PN | Performed by: INTERNAL MEDICINE

## 2023-11-06 PROCEDURE — 80061 LIPID PANEL: CPT | Performed by: INTERNAL MEDICINE

## 2023-11-06 PROCEDURE — 84550 ASSAY OF BLOOD/URIC ACID: CPT | Performed by: INTERNAL MEDICINE

## 2023-11-06 PROCEDURE — 83036 HEMOGLOBIN GLYCOSYLATED A1C: CPT | Performed by: INTERNAL MEDICINE

## 2023-11-08 NOTE — PROGRESS NOTES
I sent pt a my chart message -  I reviewed your labs.  Your thyroid functions are normal.  Your cholesterol was slightly high. You do not require medication for this at this time but I do recommend you follow a low cholesterol diet and exercise.  You can visit the American heart association website at heart.org for further info on a low cholesterol diet.I recommend we repeat your cholesterol in 6 mos. Please set up a follow up with me at this time if you do not already have one.   If the cholesterol increases further I would consider starting cholesterol medication such as Atorvastatin at that time.  Your kidney function and liver functions looked good.  It does appear you could increase your hydration. You are not anemic. Your platelet count was slightly low but stable at 142. Your uric acid level was slightly high at 8.4. If you are not currently having a gout attack then we could again consider starting allopurinol as a preventive measure and to decrease your uric acid level.  I know Dr. Simon previously prescribed it for you and you opted not to start it.  Let me now if you have changed your mind and would like to start this. Continue to work on a low purine diet for gout prevention.  Your Prostate specific antigen was normal at 2.6. Your Ha1c was normal at 5.1.  No further recommendations at this time.    Dr. VALE

## 2023-11-16 ENCOUNTER — PATIENT MESSAGE (OUTPATIENT)
Dept: SURGERY | Facility: CLINIC | Age: 53
End: 2023-11-16
Payer: COMMERCIAL

## 2023-11-16 ENCOUNTER — PATIENT MESSAGE (OUTPATIENT)
Dept: UROLOGY | Facility: CLINIC | Age: 53
End: 2023-11-16
Payer: COMMERCIAL

## 2023-11-24 DIAGNOSIS — C18.7 CANCER OF SIGMOID COLON: Primary | ICD-10-CM

## 2023-12-19 RX ORDER — TAMSULOSIN HYDROCHLORIDE 0.4 MG/1
CAPSULE ORAL
Qty: 30 CAPSULE | Refills: 12 | Status: SHIPPED | OUTPATIENT
Start: 2023-12-19 | End: 2024-03-18

## 2024-01-05 ENCOUNTER — OFFICE VISIT (OUTPATIENT)
Dept: URGENT CARE | Facility: CLINIC | Age: 54
End: 2024-01-05
Payer: COMMERCIAL

## 2024-01-05 VITALS
HEIGHT: 72 IN | WEIGHT: 270 LBS | DIASTOLIC BLOOD PRESSURE: 95 MMHG | RESPIRATION RATE: 19 BRPM | BODY MASS INDEX: 36.57 KG/M2 | TEMPERATURE: 101 F | SYSTOLIC BLOOD PRESSURE: 142 MMHG | HEART RATE: 101 BPM | OXYGEN SATURATION: 96 %

## 2024-01-05 DIAGNOSIS — J10.1 INFLUENZA B: Primary | ICD-10-CM

## 2024-01-05 DIAGNOSIS — R50.9 FEVER, UNSPECIFIED FEVER CAUSE: ICD-10-CM

## 2024-01-05 LAB
CTP QC/QA: YES
POC MOLECULAR INFLUENZA A AGN: NEGATIVE
POC MOLECULAR INFLUENZA B AGN: POSITIVE

## 2024-01-05 PROCEDURE — 99214 OFFICE O/P EST MOD 30 MIN: CPT | Mod: S$GLB,,, | Performed by: NURSE PRACTITIONER

## 2024-01-05 PROCEDURE — 87502 INFLUENZA DNA AMP PROBE: CPT | Mod: QW,S$GLB,, | Performed by: NURSE PRACTITIONER

## 2024-01-05 RX ORDER — OSELTAMIVIR PHOSPHATE 75 MG/1
75 CAPSULE ORAL 2 TIMES DAILY
Qty: 10 CAPSULE | Refills: 0 | Status: SHIPPED | OUTPATIENT
Start: 2024-01-05 | End: 2024-01-10

## 2024-01-05 RX ORDER — PROMETHAZINE HYDROCHLORIDE AND DEXTROMETHORPHAN HYDROBROMIDE 6.25; 15 MG/5ML; MG/5ML
5 SYRUP ORAL EVERY 8 HOURS PRN
Qty: 118 ML | Refills: 0 | Status: SHIPPED | OUTPATIENT
Start: 2024-01-05 | End: 2024-01-15

## 2024-01-05 RX ORDER — IPRATROPIUM BROMIDE 21 UG/1
1 SPRAY, METERED NASAL 2 TIMES DAILY
Qty: 30 ML | Refills: 0 | Status: SHIPPED | OUTPATIENT
Start: 2024-01-05 | End: 2024-01-12

## 2024-01-05 RX ORDER — ACETAMINOPHEN 500 MG
1000 TABLET ORAL
Status: COMPLETED | OUTPATIENT
Start: 2024-01-05 | End: 2024-01-05

## 2024-01-05 RX ADMIN — Medication 1000 MG: at 10:01

## 2024-01-05 NOTE — PROGRESS NOTES
Subjective:      Patient ID: Jim Brown is a 53 y.o. male.    Vitals:  height is 6' (1.829 m) and weight is 122.5 kg (270 lb). His oral temperature is 101.2 °F (38.4 °C) (abnormal). His blood pressure is 142/95 (abnormal) and his pulse is 101. His respiration is 19 and oxygen saturation is 96%.     Chief Complaint: Fever    Pt is complaining of being sick since Tuesday and has rapidly gotten worse. He has had fever since Wednesday. His throat is hurting from coughing so much. He has not taken any medications this am yet but he had fever before leaving his house.     No difficulty breathing, shortness of breath or chest pain.  No known sick contacts.    URI   This is a new problem. The current episode started in the past 7 days. The maximum temperature recorded prior to his arrival was 102 - 102.9 F. Associated symptoms include congestion, coughing, headaches, rhinorrhea, sneezing and a sore throat. Pertinent negatives include no abdominal pain, chest pain, diarrhea, dysuria, ear pain, joint pain, joint swelling, nausea, neck pain, plugged ear sensation, rash, sinus pain, swollen glands, vomiting or wheezing. He has tried acetaminophen, decongestant and antihistamine for the symptoms. The treatment provided no relief.       HENT:  Positive for congestion and sore throat. Negative for ear pain and sinus pain.    Neck: Negative for neck pain.   Cardiovascular:  Negative for chest pain.   Respiratory:  Positive for cough. Negative for wheezing.    Gastrointestinal:  Negative for abdominal pain, nausea, vomiting and diarrhea.   Genitourinary:  Negative for dysuria.   Skin:  Negative for rash.   Allergic/Immunologic: Positive for sneezing.   Neurological:  Positive for headaches.      Objective:     Physical Exam   Constitutional: He is oriented to person, place, and time. He appears well-developed. He is cooperative.  Non-toxic appearance. He appears ill. No distress.   HENT:   Head: Normocephalic and atraumatic.    Ears:   Right Ear: Hearing, tympanic membrane, external ear and ear canal normal.   Left Ear: Hearing, tympanic membrane, external ear and ear canal normal.   Nose: Mucosal edema and rhinorrhea present. No nasal deformity. No epistaxis. Right sinus exhibits no maxillary sinus tenderness and no frontal sinus tenderness. Left sinus exhibits no maxillary sinus tenderness and no frontal sinus tenderness.   Mouth/Throat: Uvula is midline, oropharynx is clear and moist and mucous membranes are normal. No trismus in the jaw. Normal dentition. No uvula swelling. Cobblestoning present. No oropharyngeal exudate, posterior oropharyngeal edema or posterior oropharyngeal erythema.   Eyes: Conjunctivae and lids are normal. No scleral icterus.   Neck: Trachea normal and phonation normal. Neck supple. No edema present. No erythema present. No neck rigidity present.   Cardiovascular: Normal rate, regular rhythm, normal heart sounds and normal pulses.   Pulmonary/Chest: Effort normal and breath sounds normal. No stridor. No respiratory distress. He has no decreased breath sounds. He has no wheezes. He has no rhonchi. He has no rales.   Abdominal: Normal appearance.   Musculoskeletal: Normal range of motion.         General: No deformity. Normal range of motion.   Neurological: He is alert and oriented to person, place, and time. He exhibits normal muscle tone. Coordination normal.   Skin: Skin is warm, dry, intact, not diaphoretic and not pale.   Psychiatric: His speech is normal and behavior is normal. Judgment and thought content normal.   Nursing note and vitals reviewed.    Results for orders placed or performed in visit on 01/05/24   POCT Influenza A/B MOLECULAR   Result Value Ref Range    POC Molecular Influenza A Ag Negative Negative, Not Reported    POC Molecular Influenza B Ag Positive (A) Negative, Not Reported     Acceptable Yes        Assessment:     1. Influenza B    2. Fever, unspecified fever cause         Plan:     Labs ordered at this visit reviewed.     Influenza B  -     oseltamivir (TAMIFLU) 75 MG capsule; Take 1 capsule (75 mg total) by mouth 2 (two) times daily. for 5 days  Dispense: 10 capsule; Refill: 0  -     promethazine-dextromethorphan (PROMETHAZINE-DM) 6.25-15 mg/5 mL Syrp; Take 5 mLs by mouth every 8 (eight) hours as needed (cough).  Dispense: 118 mL; Refill: 0  -     ipratropium (ATROVENT) 21 mcg (0.03 %) nasal spray; 1 spray by Each Nostril route 2 (two) times daily. for 7 days  Dispense: 30 mL; Refill: 0    Fever, unspecified fever cause  -     POCT Influenza A/B MOLECULAR  -     acetaminophen tablet 1,000 mg      Patient Instructions   You may continue taking Tylenol (acetaminophen) or ibuprofen for pain and fever.

## 2024-01-29 ENCOUNTER — PATIENT MESSAGE (OUTPATIENT)
Dept: SURGERY | Facility: CLINIC | Age: 54
End: 2024-01-29
Payer: COMMERCIAL

## 2024-02-15 ENCOUNTER — OFFICE VISIT (OUTPATIENT)
Dept: UROLOGY | Facility: CLINIC | Age: 54
End: 2024-02-15
Payer: COMMERCIAL

## 2024-02-15 VITALS
DIASTOLIC BLOOD PRESSURE: 87 MMHG | HEIGHT: 72 IN | SYSTOLIC BLOOD PRESSURE: 146 MMHG | WEIGHT: 270 LBS | HEART RATE: 99 BPM | BODY MASS INDEX: 36.57 KG/M2

## 2024-02-15 DIAGNOSIS — N40.1 BPH WITH URINARY OBSTRUCTION: ICD-10-CM

## 2024-02-15 DIAGNOSIS — N13.8 BPH WITH URINARY OBSTRUCTION: ICD-10-CM

## 2024-02-15 DIAGNOSIS — N52.9 ERECTILE DYSFUNCTION, UNSPECIFIED ERECTILE DYSFUNCTION TYPE: Primary | ICD-10-CM

## 2024-02-15 PROCEDURE — 3079F DIAST BP 80-89 MM HG: CPT | Mod: CPTII,S$GLB,, | Performed by: UROLOGY

## 2024-02-15 PROCEDURE — 3077F SYST BP >= 140 MM HG: CPT | Mod: CPTII,S$GLB,, | Performed by: UROLOGY

## 2024-02-15 PROCEDURE — 3008F BODY MASS INDEX DOCD: CPT | Mod: CPTII,S$GLB,, | Performed by: UROLOGY

## 2024-02-15 PROCEDURE — 99213 OFFICE O/P EST LOW 20 MIN: CPT | Mod: S$GLB,,, | Performed by: UROLOGY

## 2024-02-15 PROCEDURE — 1160F RVW MEDS BY RX/DR IN RCRD: CPT | Mod: CPTII,S$GLB,, | Performed by: UROLOGY

## 2024-02-15 PROCEDURE — 1159F MED LIST DOCD IN RCRD: CPT | Mod: CPTII,S$GLB,, | Performed by: UROLOGY

## 2024-02-15 PROCEDURE — 99999 PR PBB SHADOW E&M-EST. PATIENT-LVL IV: CPT | Mod: PBBFAC,,, | Performed by: UROLOGY

## 2024-02-15 RX ORDER — TAMSULOSIN HYDROCHLORIDE 0.4 MG/1
0.4 CAPSULE ORAL DAILY
Qty: 30 CAPSULE | Refills: 12 | Status: SHIPPED | OUTPATIENT
Start: 2024-02-15

## 2024-02-15 RX ORDER — SILDENAFIL 100 MG/1
100 TABLET, FILM COATED ORAL DAILY PRN
Qty: 30 TABLET | Refills: 11 | Status: SHIPPED | OUTPATIENT
Start: 2024-02-15 | End: 2025-02-14

## 2024-02-15 NOTE — PROGRESS NOTES
Subjective:       Patient ID: Jim Brown is a 53 y.o. male.    Chief Complaint: Follow-up (Pt here for annual check up. Pt states he'd like to review psa numbers and refill medications. Pt did not recall any family history of prostate cancer. )    HPI  patient is here for refill on Flomax and viagra.  He does not take nitroglycerin.   His PSA is actually come down a bit he had a P reds 2 lesion year ago    Past Medical History:   Diagnosis Date    COVID-19     12/2020    Hyperlipidemia 11/14/2014 11/13/2014:     Hypertension        Past Surgical History:   Procedure Laterality Date    COLON SURGERY      COLONOSCOPY  08/10/2015    COLONOSCOPY N/A 11/23/2021    Procedure: COLONOSCOPY;  Surgeon: GM Webber MD;  Location: [facility] ENDO (4TH FLR);  Service: Endoscopy;  Laterality: N/A;  Covid test 11/23 Casey, instructions emailed to [email or web address]    COLONOSCOPY N/A 10/11/2022    Procedure: COLONOSCOPY;  Surgeon: GM Webber MD;  Location: [facility] ENDO (4TH FLR);  Service: Endoscopy;  Laterality: N/A;  Rapid, instr. via mail & portal, clears 4 hrs prior, am prep -PC    ENDOSCOPIC ULTRASOUND OF UPPER GASTROINTESTINAL TRACT N/A 5/30/2023    Procedure: ULTRASOUND, UPPER GI TRACT, ENDOSCOPIC;  Surgeon: Jet Garcia MD;  Location: [facility] ENDO (2ND FLR);  Service: Endoscopy;  Laterality: N/A;  Saw Dr. Bustamante in clinic, refused [place] and did not want to wait until after Dr. Bustamante's vacation to schedule [facility] him-DS  5/22/23-Instructions via portal-DS    FLEXIBLE SIGMOIDOSCOPY  01/13/2022    Procedure: SIGMOIDOSCOPY, FLEXIBLE;  Surgeon: GM Webber MD;  Location: [facility] OR 2ND FLR;  Service: Colon and Rectal;;    MOBILIZATION OF SPLENIC FLEXURE  01/13/2022    Procedure: MOBILIZATION, SPLENIC FLEXURE;  Surgeon: GM Webber MD;  Location: [facility] OR 2ND FLR;  Service: Colon and Rectal;;    ROBOT-ASSISTED LOW ANTERIOR RESECTION OF COLON  01/13/2022    Procedure: ROBOTIC RESECTION, COLON,  LOW ANTERIOR;  Surgeon: GM Webber MD;  Location: [facility] OR [address] [place] [address];  Service: Colon and Rectal;;    TONSILLECTOMY         Family History   Problem Relation Age of Onset    Dementia Mother     Cancer Maternal Grandfather     Coronary artery disease Maternal Grandfather     Heart attack Maternal Grandfather 55    Colon cancer Paternal Grandmother     Cancer Paternal Grandmother         colon cancer    Esophageal cancer Neg Hx        Social History     Socioeconomic History    Marital status:    Tobacco Use    Smoking status: Never    Smokeless tobacco: Never   Substance and Sexual Activity    Alcohol use: Yes     Alcohol/week: 3.0 standard drinks of alcohol     Types: 3 Drinks containing 0.5 oz of alcohol per week     Comment: 2-3 on weekends    Drug use: Never    Sexual activity: Yes     Partners: Female       Allergies:  Lisinopril    Medications:    Current Outpatient Medications:     albuterol (PROVENTIL HFA) 90 mcg/actuation inhaler, Inhale 2 puffs into the lungs every 6 (six) hours as needed for Wheezing. Rescue, Disp: 18 g, Rfl: 1    ALPRAZolam (XANAX) 0.25 MG tablet, Take 1 tablet (0.25 mg total) by mouth 3 (three) times daily as needed (anxiety)., Disp: 60 tablet, Rfl: 0    ascorbic acid, vitamin C, (VITAMIN C) 500 MG tablet, Take 1 tablet (500 mg total) by mouth 2 (two) times daily., Disp:  , Rfl:     losartan (COZAAR) 50 MG tablet, TAKE 1 TABLET(50 MG) BY MOUTH EVERY DAY, Disp: 90 tablet, Rfl: 1    meloxicam (MOBIC) 15 MG tablet, Take 15 mg by mouth once daily., Disp: , Rfl:     multivitamin Tab, Take 1 tablet by mouth once daily., Disp: 30 tablet, Rfl: 11    sildenafiL (VIAGRA) 100 MG tablet, Take 1 tablet (100 mg total) by mouth daily as needed., Disp: 30 tablet, Rfl: 11    sod sulf-pot chloride-mag sulf (SUTAB) 1.479-0.188- 0.225 gram tablet, Take 12 tablets by mouth once daily. Take according to package instructions with indicated amount of water. (Patient not taking: Reported on 9/25/2023),  Disp: 24 tablet, Rfl: 0    tamsulosin (FLOMAX) 0.4 mg Cap, Take 1 capsule (0.4 mg total) by mouth once daily. (Patient not taking: Reported on 1/5/2024), Disp: 30 capsule, Rfl: 12    tamsulosin (FLOMAX) 0.4 mg Cap, TAKE 1 CAPSULE(0.4 MG) BY MOUTH EVERY DAY, Disp: 30 capsule, Rfl: 12  No current facility-administered medications for this visit.    Facility-Administered Medications Ordered in Other Visits:     gabapentin capsule 300 mg, 300 mg, Oral, TID, Didi Claros NP, 300 mg at 01/18/22 0849    Review of Systems   Constitutional:  Negative for activity change, appetite change, chills, diaphoresis, fatigue, fever and unexpected weight change.   HENT:  Negative for congestion, dental problem, hearing loss, mouth sores, postnasal drip, rhinorrhea, sinus pressure and trouble swallowing.    Eyes:  Negative for pain, discharge and itching.   Respiratory:  Negative for apnea, cough, choking, chest tightness, shortness of breath and wheezing.    Cardiovascular:  Negative for chest pain, palpitations and leg swelling.   Gastrointestinal:  Negative for abdominal distention, abdominal pain, anal bleeding, blood in stool, constipation, diarrhea, nausea, rectal pain and vomiting.   Endocrine: Negative for polydipsia and polyuria.   Genitourinary:  Negative for decreased urine volume, difficulty urinating, dysuria, enuresis, flank pain, frequency, genital sores, hematuria, penile discharge, penile pain, penile swelling, scrotal swelling, testicular pain and urgency.   Musculoskeletal:  Negative for arthralgias, back pain and myalgias.   Skin:  Negative for color change, rash and wound.   Neurological:  Negative for dizziness, syncope, speech difficulty, light-headedness and headaches.   Hematological:  Negative for adenopathy. Does not bruise/bleed easily.   Psychiatric/Behavioral:  Negative for behavioral problems, confusion, hallucinations and sleep disturbance.        Objective:      Physical Exam  Constitutional:        Appearance: He is well-developed.   HENT:      Head: Normocephalic.   Cardiovascular:      Rate and Rhythm: Normal rate.   Pulmonary:      Effort: Pulmonary effort is normal.   Abdominal:      Palpations: Abdomen is soft.   Genitourinary:     Prostate: Normal.   Skin:     General: Skin is warm.   Neurological:      Mental Status: He is alert.         Assessment:       No diagnosis found.    Plan:       There are no diagnoses linked to this encounter.    Return to clinic 6 months with a PSA his YOVANY today was negative

## 2024-02-19 ENCOUNTER — PATIENT MESSAGE (OUTPATIENT)
Dept: SURGERY | Facility: CLINIC | Age: 54
End: 2024-02-19
Payer: COMMERCIAL

## 2024-02-19 ENCOUNTER — PATIENT MESSAGE (OUTPATIENT)
Dept: UROLOGY | Facility: CLINIC | Age: 54
End: 2024-02-19
Payer: COMMERCIAL

## 2024-02-20 ENCOUNTER — TELEPHONE (OUTPATIENT)
Dept: SURGERY | Facility: CLINIC | Age: 54
End: 2024-02-20
Payer: COMMERCIAL

## 2024-02-20 NOTE — TELEPHONE ENCOUNTER
Spoke with pt regarding request to reschedule CT scan to 2/29 to avoid scheduling conflict. Informed that 2/29 appt available for 3:45 PM. Pt verbally confirmed to new date and time. Denies further questions at this time.

## 2024-02-29 ENCOUNTER — HOSPITAL ENCOUNTER (OUTPATIENT)
Dept: RADIOLOGY | Facility: HOSPITAL | Age: 54
Discharge: HOME | End: 2024-02-29
Attending: COLON & RECTAL SURGERY
Payer: COMMERCIAL

## 2024-02-29 DIAGNOSIS — C18.7 CANCER OF SIGMOID COLON: ICD-10-CM

## 2024-02-29 PROCEDURE — 74177 CT ABD & PELVIS W/CONTRAST: CPT | Mod: 26,,, | Performed by: RADIOLOGY

## 2024-02-29 PROCEDURE — 74177 CT ABD & PELVIS W/CONTRAST: CPT | Mod: TC

## 2024-02-29 PROCEDURE — 25500020 PHARM REV CODE 255: Performed by: COLON & RECTAL SURGERY

## 2024-02-29 PROCEDURE — A9698 NON-RAD CONTRAST MATERIALNOC: HCPCS | Performed by: COLON & RECTAL SURGERY

## 2024-02-29 PROCEDURE — 71260 CT THORAX DX C+: CPT | Mod: 26,,, | Performed by: RADIOLOGY

## 2024-02-29 RX ADMIN — BARIUM SULFATE 450 ML: 20 SUSPENSION ORAL at 02:02

## 2024-02-29 RX ADMIN — IOHEXOL 100 ML: 350 INJECTION, SOLUTION INTRAVENOUS at 03:02

## 2024-03-01 LAB
CREAT SERPL-MCNC: 1 MG/DL (ref 0.5–1.4)
SAMPLE: NORMAL

## 2024-03-12 ENCOUNTER — TELEPHONE (OUTPATIENT)
Dept: SURGERY | Facility: CLINIC | Age: 54
End: 2024-03-12
Payer: COMMERCIAL

## 2024-03-13 ENCOUNTER — OFFICE VISIT (OUTPATIENT)
Dept: SURGERY | Facility: CLINIC | Age: 54
End: 2024-03-13
Payer: COMMERCIAL

## 2024-03-13 VITALS
BODY MASS INDEX: 36.48 KG/M2 | SYSTOLIC BLOOD PRESSURE: 122 MMHG | WEIGHT: 268.94 LBS | DIASTOLIC BLOOD PRESSURE: 87 MMHG | HEART RATE: 75 BPM

## 2024-03-13 DIAGNOSIS — Z08 ENCOUNTER FOR FOLLOW-UP SURVEILLANCE OF COLON CANCER: Primary | ICD-10-CM

## 2024-03-13 DIAGNOSIS — Z85.038 ENCOUNTER FOR FOLLOW-UP SURVEILLANCE OF COLON CANCER: Primary | ICD-10-CM

## 2024-03-13 PROCEDURE — 3008F BODY MASS INDEX DOCD: CPT | Mod: CPTII,S$GLB,, | Performed by: COLON & RECTAL SURGERY

## 2024-03-13 PROCEDURE — 99999 PR PBB SHADOW E&M-EST. PATIENT-LVL III: CPT | Mod: PBBFAC,,, | Performed by: COLON & RECTAL SURGERY

## 2024-03-13 PROCEDURE — 3074F SYST BP LT 130 MM HG: CPT | Mod: CPTII,S$GLB,, | Performed by: COLON & RECTAL SURGERY

## 2024-03-13 PROCEDURE — 99214 OFFICE O/P EST MOD 30 MIN: CPT | Mod: S$GLB,,, | Performed by: COLON & RECTAL SURGERY

## 2024-03-13 PROCEDURE — 4010F ACE/ARB THERAPY RXD/TAKEN: CPT | Mod: CPTII,S$GLB,, | Performed by: COLON & RECTAL SURGERY

## 2024-03-13 PROCEDURE — 3079F DIAST BP 80-89 MM HG: CPT | Mod: CPTII,S$GLB,, | Performed by: COLON & RECTAL SURGERY

## 2024-03-13 NOTE — PROGRESS NOTES
HPI:  Jim Brown is a 54 y.o. male with history of stage I colon CA      1/13/2022 robotic LAR for rectosigmoid cancer    Collected: 01/13/22 1259   Result status: Final   Resulting lab: [facility]   Value: 1. SIGMOID COLON AND RECTUM, LOW ANTERIOR RESECTION:  - No evidence of residual adenoma or malignancy, pT0  - Tattoo site (entirely submitted for histopathologic evaluation)  - Sixteen lymph nodes, negative for metastatic disease (0/16), pN0  - Background colorectal mucosa exhibiting reactive epithelial changes and  diverticular disease associated with chronic inflammation  - See comments for synoptic report  2. DONOT, PROXIMAL, EXCISION:  - Segment of colonic tissue with no significant histopathologic abnormality  - No evidence of dysplasia or malignancy  3. DONUT, DISTAL, EXCISION:  - Segment of colorectal tissue with no significant histopathologic abnormality  - No evidence of dysplasia or malignancy   Comment: Interp By Jay Castillo M.D., Signed on 01/21/2022 at 17:16        10-  colonoscopy  Findings:       The perianal and digital rectal examinations were normal.        There was evidence of a prior end-to-end colo-rectal anastomosis in        the proximal rectum. This was patent and was characterized by        healthy appearing mucosa and an intact appearance. The anastomosis        was traversed.        Two semi-sessile polyps were found in the distal transverse colon.        The polyps were 2 to 4 mm in size. These polyps were removed with a        cold snare. Resection and retrieval were complete. Verification of        patient identification for the specimen was done. Estimated blood        loss was minimal.        The terminal ileum appeared normal.        The retroflexed view of the distal rectum and anal verge was normal        and showed no anal or rectal abnormalities.       Feels well.  No pain.  BMs regular.  No BRB  Appetite good.  No wt  loss.  Activity level - normal.       3-  Interval hx:    In general he feels well.  He denies any change in appetite or weight loss.    Activity level is normal.  He is working full-time.  Bowel movements are regular.  He denies any change.  He denies any bleeding.         Past Medical History:   Diagnosis Date    Hyperlipidemia 11/14/2014 11/13/2014:     Hypertension            Past Medical History:   Diagnosis Date    COVID-19     12/2020    Hyperlipidemia 11/14/2014 11/13/2014:     Hypertension         Past Surgical History:   Procedure Laterality Date    COLON SURGERY      COLONOSCOPY  08/10/2015    COLONOSCOPY N/A 11/23/2021    Procedure: COLONOSCOPY;  Surgeon: GM Webber MD;  Location: [facility] ENDO (4TH FLR);  Service: Endoscopy;  Laterality: N/A;  Covid test 11/23 Casey, yulisa emailed to [email or web address]    COLONOSCOPY N/A 10/11/2022    Procedure: COLONOSCOPY;  Surgeon: GM Webber MD;  [facility] (4TH FLR);  Service: Endoscopy;  Laterality: N/A;  Rapid, instr. via mail & portal, clears 4 hrs prior, am prep -PC    ENDOSCOPIC ULTRASOUND OF UPPER GASTROINTESTINAL TRACT N/A 5/30/2023    Procedure: ULTRASOUND, UPPER GI TRACT, ENDOSCOPIC;  Surgeon: Jet Garcia MD;  Location: [facility] ENDO (2ND FLR);  Service: Endoscopy;  Laterality: N/A;  Saw Dr. Bustamante in clinic, refused [place] and did not want to wait until after Dr. Bustamante's vacation to schedule wtih him-DS  5/22/23-Instructions via portal-DS    FLEXIBLE SIGMOIDOSCOPY  01/13/2022    Procedure: SIGMOIDOSCOPY, FLEXIBLE;  Surgeon: GM Webber MD;  Location: [facility] OR 2ND FLR;  Service: Colon and Rectal;;    MOBILIZATION OF SPLENIC FLEXURE  01/13/2022    Procedure: MOBILIZATION, SPLENIC FLEXURE;  Surgeon: GM Webber MD;  Location: [facility] OR 2ND FLR;  Service: Colon and Rectal;;    ROBOT-ASSISTED LOW ANTERIOR RESECTION OF COLON  01/13/2022    Procedure: ROBOTIC RESECTION, COLON, LOW ANTERIOR;   Surgeon: GM Webber MD;  Location: [facility] OR [address] [place] [address];  Service: Colon and Rectal;;    TONSILLECTOMY         Review of patient's allergies indicates:   Allergen Reactions    Lisinopril Other (See Comments)     cough       Family History   Problem Relation Age of Onset    Dementia Mother     Cancer Maternal Grandfather     Coronary artery disease Maternal Grandfather     Heart attack Maternal Grandfather 55    Colon cancer Paternal Grandmother     Cancer Paternal Grandmother         colon cancer    Esophageal cancer Neg Hx        Social History     Socioeconomic History    Marital status:    Tobacco Use    Smoking status: Never    Smokeless tobacco: Never   Substance and Sexual Activity    Alcohol use: Yes     Alcohol/week: 3.0 standard drinks of alcohol     Types: 3 Drinks containing 0.5 oz of alcohol per week     Comment: 2-3 on weekends    Drug use: Never    Sexual activity: Yes     Partners: Female       ROS:  GENERAL: No fever, chills, fatigability or weight loss.  Integument: No rashes, redness, icterus  CHEST: Denies MARIE, cyanosis, wheezing, cough and sputum production.  CARDIOVASCULAR: Denies chest pain, PND, orthopnea or reduced exercise tolerance.  GI: Denies abd pain, dysphagia, nausea, vomiting, no hematemesis   : Denies burning on urination, no hematuria, no bacteriuria  MSK: No deformities, swelling, joint pain swelling  Neurologic: No HAs, seizures, weakness, paresthesias, gait problems    PE:  General appearance healthy  /87 (BP Location: Left arm, Patient Position: Sitting, BP Method: Large (Automatic))   Pulse 75   Wt 122 kg (268 lb 15.4 oz)   BMI 36.48 kg/m²     Sclera/ Skin anicteric  LN none palpable  AT NC EOMI  Neck supple trachea midline   Chest symmetric, nl excursion, no retractions, breathing comfortably  Abdomen  ND soft NT.  no masses, no organomegaly  EXT - no CCE  Neuro:  Mood/ affect nl, alert and oriented x 3, moves all ext's, gait nl      Assessment:  Stage I  colon cancer, rectosigmoid, without evidence of recurrence  Good performance status    Plan:  I reviewed with Jim new recommendations from the NCCN for stage I colon cancer.  Intensive surveillance including blood work and imaging is no longer recommended.  He should undergo screening colonoscopy in 3 years since his last study.

## 2024-03-17 NOTE — PROGRESS NOTES
[facility] Note    Chief Complaint      Chief Complaint   Patient presents with    Follow-up       History of Present Illness      Jim Brown is a 54 y.o.  M with colon Ca, BPH, Gout, HTN, Anxiety, Male ED presents to fu chronic issues. Last visit - 9/25/23      HLD - cholesterol was slightly high. I rec a low cholesterol diet and exercise. Pt can visit the American heart association website at heart.org for further info on a low cholesterol diet.I recommend we repeat  cholesterol in 6 mos. If the cholesterol increases further I would consider starting cholesterol medication such as Atorvastatin at that time.  The 10-year ASCVD risk score (Risk of having a cardiovascular event within 10 yrs) is: 7.3%    Hyperuricemia - uric acid level was slightly high at 8.4 -11/6/23. We discussed allopurinol as a preventive measure and to  uric acid level. He opted not to start it.  Cont. to work on a low purine diet for gout prevention.         Thyroid nodule -   Thyroid U/S - 4/3/23 - 1.7 cm TI-RADS 2 nodule in the right lobe of the thyroid.  No FNA or surveillance is advised. Will repeat U/S in 1 yr (4/2024).       Hepatic cysts - there was a Pancreatic cyst -  seen on prev CT C/A/P - 2/1/23 and not mentioned on recent CT or EUS. Fu with GI, , for further eval. EUS - 5/30/23 - A cyst was found in the subdiaphragmatic area of the liver and measured 17 mm by 10 mm. Will cont to monitor. He saw Dr. Webber 3/13/24    Fat containing inguinal hernias - noted on recent CT -2/29/24.  D/w pt.  Exam wnl.     Anemia -  H/H - 114.8/44.5 - 11/6/23 - resolved. Folic acid was normal and Vitamin B12 was low normal at 439. Iron studies were normal.       Inc PSA - PSA was normal at 2.6-Fu with [facility], Dr. Johnson.    MRI Prostate - 12/29/22 - BPH and peripheral zone heterogeneity.  No focal lesion concerning for prostate cancer.     BPH - Fu by Dr. Alex BARRAZA. Pt on Flomax.  Doing ok.  No dizziness.       Thrombocytopenia - 142- Stable since 2010.      H/o Stage I Colon Ca - Fu by Dr. Webber.  CEA < 1.7  - 1/11/23.  S/p partial colectomy - 1/2022. CT CAP - 2/29/24- Stable right thyroid nodule. Minimal atherosclerosis of the coronary arteries. Multiple bilateral solid pulmonary nodules, stable in size and number compared to prior exam: Stable 1.0 cm subcarinal lymph node. Mildly enlarged measuring 14.6 cm . Small accessory splenule. Prostatomegaly with punctate internal calcifications. Mild multilevel degenerative change. Prominent fat in inguinal canals. Hepatomegaly. Stable 1.5 cm cyst in the left hepatic lobe.  Had fu with Dr. Webber 3/13/24 and reports he was told to repeat C-scope in '25.      HTN - Pt controlled on Losartan 50 mg/d.  Prev had cough on Lisinopril.      Anxiety - Pt on Xanax daily-BID prn. Rarely needs this when stressed and caring for his Mom. Rx when dx with Colon ca. 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 Has been caring for his Mom with dementia.  It gets frustrating. He does not wish to be on Zoloft. He was on it in the past and felt it affected his sex drive and caused anorgasmia. 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 Could try Buspirone in future if needed.      Male ED - Pt on Viagra prn.      HCM - Flu - none - declines;  Tdap - 11/12/14;  PCV 13 - none;  PVX 23 - none;  Shingrix - none - due - defers;  COVID - 19 Vaccine #1 none -declines; Hep C Screen - 11/2/22 - neg.; HIV Screen - none - declines; C-scope 10/11/22 - + polyps - repeat 3 yrs;  PSA - 3.1 - 11/2/22; Prev PCP - Dr. Myers; CRS - Dr. Webber;   - Dr. Johnson; Podiatrist - Dr. Simon; Well visit - 9/25/23    Patient Care Team:  Viviana Bee MD as PCP - General (Internal Medicine)  Joanie Mena LPN (Inactive) as Licensed Practical Nurse (Internal Medicine)  Joanie Mena LPN (Inactive) as Care Coordinator (Internal Medicine)  Jose Juan Simon DPM (Podiatry)      Health Maintenance:  Immunization History   Administered Date(s) Administered    Tdap 11/12/2014      Health Maintenance   Topic Date Due    Shingles Vaccine (1 of 2) Never done    TETANUS VACCINE  11/12/2024    Colorectal Cancer Screening  10/11/2025    Lipid Panel  11/06/2028    Hepatitis C Screening  Completed        Past Medical History:  Past Medical History:   Diagnosis Date    COVID-19     12/2020    Hyperlipidemia 11/14/2014 11/13/2014:     Hypertension        Past Surgical History:   has a past surgical history that includes Colonoscopy (08/10/2015); Tonsillectomy; Colonoscopy (N/A, 11/23/2021); Robot-assisted low anterior resection of colon (01/13/2022); Mobilization of splenic flexure (01/13/2022); Flexible sigmoidoscopy (01/13/2022); Colon surgery; Colonoscopy (N/A, 10/11/2022); and Endoscopic ultrasound of upper gastrointestinal tract (N/A, 5/30/2023).    Family History:  family history includes Cancer in his maternal grandfather and paternal grandmother; Colon cancer in his paternal grandmother; Coronary artery disease in his maternal grandfather; Dementia in his mother; Heart attack (age of onset: 55) in his maternal grandfather.     Social History:  Social History     Tobacco Use    Smoking status: Never    Smokeless tobacco: Never   Substance Use Topics    Alcohol use: Yes     Alcohol/week: 3.0 standard drinks of alcohol     Types: 3 Drinks containing 0.5 oz of alcohol per week     Comment: 2-3 on weekends    Drug use: Never       Review of Systems   Constitutional:  Negative for chills, diaphoresis and fever.   HENT:  Positive for hearing loss, postnasal drip and sore throat. Negative for rhinorrhea.         Background noise can affect things. Does not feel he needs further intervention at this time.    Eyes:  Negative for discharge, itching and visual disturbance.   Respiratory:  Positive for cough. Negative for chest tightness and shortness of breath.         Prod green sputum.     Cardiovascular:  Negative for chest pain.   Gastrointestinal:  Negative for abdominal pain, blood in stool, constipation, diarrhea, nausea and vomiting.   Endocrine: Negative for cold intolerance, heat intolerance and polydipsia.   Genitourinary:  Negative for difficulty urinating, dysuria, frequency and hematuria.        No hesitancy and no nocturia. Does well on flomax     Musculoskeletal:  Negative for arthralgias and myalgias.   Neurological:  Negative for dizziness and headaches.   Psychiatric/Behavioral:  Negative for dysphoric mood. The patient is nervous/anxious.         Medications:    Current Outpatient Medications:     ALPRAZolam (XANAX) 0.25 MG tablet, Take 1 tablet (0.25 mg total) by mouth 3 (three) times daily as needed (anxiety)., Disp: 60 tablet, Rfl: 0    ascorbic acid, vitamin C, (VITAMIN C) 500 MG tablet, Take 1 tablet (500 mg total) by mouth 2 (two) times daily., Disp:  , Rfl:     multivitamin Tab, Take 1 tablet by mouth once daily., Disp: 30 tablet, Rfl: 11    sildenafiL (VIAGRA) 100 MG tablet, Take 1 tablet (100 mg total) by mouth daily as needed., Disp: 30 tablet, Rfl: 11    tamsulosin (FLOMAX) 0.4 mg Cap, Take 1 capsule (0.4 mg total) by mouth once daily., Disp: 30 capsule, Rfl: 12    albuterol (PROVENTIL HFA) 90 mcg/actuation inhaler, Inhale 2 puffs into the lungs every 6 (six) hours as needed for Wheezing. Rescue, Disp: 18 g, Rfl: 1    fluticasone propionate (FLONASE) 50 mcg/actuation nasal spray, 2 sprays (100 mcg total) by Each Nostril route once daily., Disp: 18.2 mL, Rfl: 3    losartan (COZAAR) 50 MG tablet, Take 1 tablet (50 mg total) by mouth once daily., Disp: 90 tablet, Rfl: 1    sod sulf-pot chloride-mag sulf (SUTAB) 1.479-0.188- 0.225 gram tablet, Take 12 tablets by mouth once daily. Take according to package instructions with indicated amount of water. (Patient not taking: Reported on 3/18/2024), Disp: 24 tablet, Rfl: 0  No current facility-administered medications for this  visit.    Facility-Administered Medications Ordered in Other Visits:     gabapentin capsule 300 mg, 300 mg, Oral, TID, Didi Claros NP, 300 mg at 01/18/22 0883     Allergies:  Review of patient's allergies indicates:   Allergen Reactions    Lisinopril Other (See Comments)     cough       Physical Exam      Vital Signs  Temp: 98 °F (36.7 °C)  Temp Source: Oral  Pulse: 73  Resp: 18  SpO2: 98 %  BP: 136/86  BP Location: Right arm  Patient Position: Sitting  Pain Score: 0-No pain  Height and Weight  Height: 6' (182.9 cm)  Weight: 125.3 kg (276 lb 3.8 oz)  BSA (Calculated - sq m): 2.52 sq meters  BMI (Calculated): 37.5  Weight in (lb) to have BMI = 25: 183.9      Patient Position: Sitting      Physical Exam  Vitals reviewed.   Constitutional:       General: He is not in acute distress.     Appearance: Normal appearance. He is not ill-appearing, toxic-appearing or diaphoretic.   HENT:      Head: Normocephalic and atraumatic.      Comments: No sinus TTP     Right Ear: Tympanic membrane normal.      Left Ear: Tympanic membrane normal.      Nose:      Comments: Narrow nasal passages Rt > Left - no obstruction.      Mouth/Throat:      Mouth: Mucous membranes are moist.      Pharynx: No posterior oropharyngeal erythema.   Eyes:      Extraocular Movements: Extraocular movements intact.      Conjunctiva/sclera: Conjunctivae normal.      Pupils: Pupils are equal, round, and reactive to light.   Cardiovascular:      Rate and Rhythm: Regular rhythm.      Pulses: Normal pulses.      Heart sounds: Normal heart sounds.   Pulmonary:      Effort: Pulmonary effort is normal. No respiratory distress.      Breath sounds: Normal breath sounds. No stridor. No wheezing, rhonchi or rales.   Abdominal:      General: There is no distension.      Palpations: Abdomen is soft.      Tenderness: There is no abdominal tenderness.   Genitourinary:     Penis: Normal.       Comments: No palpable inguinal hernias  Skin:     General: Skin is warm.    Neurological:      General: No focal deficit present.      Mental Status: He is alert and oriented to person, place, and time.          Laboratory:  CBC:  Recent Labs   Lab 01/18/22  0513 11/02/22  0912 11/06/23  0841   WBC 5.77 5.59 6.70   RBC 3.59 L 4.38 L 4.59 L   Hemoglobin 11.5 L 13.7 L 14.8   Hematocrit 34.2 L 43.0 44.5   Platelets 150 128 L 142 L   MCV 95 98 97   MCH 32.0 H 31.3 H 32.2 H   MCHC 33.6 31.9 L 33.3       CMP:  Recent Labs   Lab 10/29/21  0946 01/13/22  1351 11/02/22  0912 03/20/23  1115 11/06/23  0841   Glucose 107   < > 102   < > 98   Calcium 9.9   < > 9.3   < > 9.3   Albumin 4.1  --  3.9  --  4.1   Total Protein 7.4  --  6.7  --  7.3   Sodium 138   < > 140   < > 141   Potassium 4.6   < > 4.4   < > 4.4   CO2 30 H   < > 26   < > 24   Chloride 103   < > 105   < > 105   BUN 17   < > 17   < > 23 H   Creatinine 1.0   < > 0.9   < > 1.1   Alkaline Phosphatase 35 L  --  26 L  --  30 L   ALT 21  --  21  --  21   AST 20  --  18  --  20   Total Bilirubin 0.8  --  0.6  --  0.5    < > = values in this interval not displayed.          LIPIDS:  Recent Labs   Lab 10/29/21  0946 11/02/22  0912 11/06/23  0841   TSH  --  1.608 1.493   HDL 59 48 56   Cholesterol 220 H 204 H 218 H   Triglycerides 156 H 119 72   LDL Cholesterol 129.8 132.2 147.6   HDL/Cholesterol Ratio 26.8 23.5 25.7   Non-HDL Cholesterol 161 156 162   Total Cholesterol/HDL Ratio 3.7 4.3 3.9       TSH:  Recent Labs   Lab 11/02/22  0912 11/06/23  0841   TSH 1.608 1.493       A1C:  Recent Labs   Lab 11/02/22  0912 11/06/23  0841   Hemoglobin A1C 5.1 5.1          Other:   Recent Labs   Lab 03/20/23  1115   Vitamin B-12 439   Ferritin 232   Iron 117   Transferrin 238   TIBC 352   Saturated Iron 33     Recent Labs   Lab 11/02/22  0912 11/06/23  0841   PSA, Screen 3.1 2.6   Hepatitis C Ab Non-reactive  --        Assessment/Plan     Jimkelsy Brown is a 54 y.o.male with:    Thyroid nodule  -     US Soft Tissue Head Neck; Future; Expected date:  04/04/2024  - Repeat U/S.     Hypertension, unspecified type  -     losartan (COZAAR) 50 MG tablet; Take 1 tablet (50 mg total) by mouth once daily.  Dispense: 90 tablet; Refill: 1  -     Comprehensive Metabolic Panel; Future; Expected date: 05/06/2024  -     Lipid Panel; Future; Expected date: 05/06/2024  - Controlled.  Cont current.     Hyperlipidemia, unspecified hyperlipidemia type  -     Comprehensive Metabolic Panel; Future; Expected date: 05/06/2024  -     Lipid Panel; Future; Expected date: 05/06/2024  - Stable.  Cont to work on lifestyle modification. Repeat FLP. Consider statin.     Thrombocytopenia  -     CBC Auto Differential; Future; Expected date: 05/06/2024  - Stable.     Anxiety  -Stable. Cont current.     Gout, unspecified cause, unspecified chronicity, unspecified site  -     URIC ACID; Future; Expected date: 05/06/2024  - Cont low ori diet.  Repeat UA level.     Acute rhinitis  -     fluticasone propionate (FLONASE) 50 mcg/actuation nasal spray; 2 sprays (100 mcg total) by Each Nostril route once daily.  Dispense: 18.2 mL; Refill: 3    - Cont to monitor Sx's.  If worsen alert MD and will send Abx (augmentin).  Will start regular claritin not claritin D and add Flonase 2 SEN/d.     Hepatic cyst   - Stable. Monitored with Colon Ca surveillance.     History of colon cancer   - Stable. Monitored by Dr. Webber.     Chronic conditions status updated as per HPI.  Other than changes above, cont current medications and maintain follow up with specialists.  Follow up in about 6 months (around 9/18/2024) for well visit or sooner if needed.      Viviana Bee MD  [facility]

## 2024-03-18 ENCOUNTER — OFFICE VISIT (OUTPATIENT)
Dept: PRIMARY CARE CLINIC | Facility: CLINIC | Age: 54
End: 2024-03-18
Payer: COMMERCIAL

## 2024-03-18 VITALS
DIASTOLIC BLOOD PRESSURE: 86 MMHG | WEIGHT: 276.25 LBS | HEIGHT: 72 IN | BODY MASS INDEX: 37.42 KG/M2 | OXYGEN SATURATION: 98 % | TEMPERATURE: 98 F | RESPIRATION RATE: 18 BRPM | HEART RATE: 73 BPM | SYSTOLIC BLOOD PRESSURE: 136 MMHG

## 2024-03-18 DIAGNOSIS — Z85.038 HISTORY OF COLON CANCER: ICD-10-CM

## 2024-03-18 DIAGNOSIS — M10.9 GOUT, UNSPECIFIED CAUSE, UNSPECIFIED CHRONICITY, UNSPECIFIED SITE: ICD-10-CM

## 2024-03-18 DIAGNOSIS — E04.1 THYROID NODULE: ICD-10-CM

## 2024-03-18 DIAGNOSIS — D69.6 THROMBOCYTOPENIA: ICD-10-CM

## 2024-03-18 DIAGNOSIS — F41.9 ANXIETY: ICD-10-CM

## 2024-03-18 DIAGNOSIS — E78.5 HYPERLIPIDEMIA, UNSPECIFIED HYPERLIPIDEMIA TYPE: ICD-10-CM

## 2024-03-18 DIAGNOSIS — I10 HYPERTENSION, UNSPECIFIED TYPE: Primary | ICD-10-CM

## 2024-03-18 DIAGNOSIS — J00 ACUTE RHINITIS: ICD-10-CM

## 2024-03-18 DIAGNOSIS — K76.89 HEPATIC CYST: ICD-10-CM

## 2024-03-18 PROBLEM — C18.7 MALIGNANT NEOPLASM OF SIGMOID COLON: Status: RESOLVED | Noted: 2021-12-15 | Resolved: 2024-03-18

## 2024-03-18 PROCEDURE — 1159F MED LIST DOCD IN RCRD: CPT | Mod: CPTII,S$GLB,, | Performed by: INTERNAL MEDICINE

## 2024-03-18 PROCEDURE — 4010F ACE/ARB THERAPY RXD/TAKEN: CPT | Mod: CPTII,S$GLB,, | Performed by: INTERNAL MEDICINE

## 2024-03-18 PROCEDURE — 3079F DIAST BP 80-89 MM HG: CPT | Mod: CPTII,S$GLB,, | Performed by: INTERNAL MEDICINE

## 2024-03-18 PROCEDURE — 3008F BODY MASS INDEX DOCD: CPT | Mod: CPTII,S$GLB,, | Performed by: INTERNAL MEDICINE

## 2024-03-18 PROCEDURE — 1160F RVW MEDS BY RX/DR IN RCRD: CPT | Mod: CPTII,S$GLB,, | Performed by: INTERNAL MEDICINE

## 2024-03-18 PROCEDURE — 99999 PR PBB SHADOW E&M-EST. PATIENT-LVL V: CPT | Mod: PBBFAC,,, | Performed by: INTERNAL MEDICINE

## 2024-03-18 PROCEDURE — 3075F SYST BP GE 130 - 139MM HG: CPT | Mod: CPTII,S$GLB,, | Performed by: INTERNAL MEDICINE

## 2024-03-18 PROCEDURE — 99214 OFFICE O/P EST MOD 30 MIN: CPT | Mod: S$GLB,,, | Performed by: INTERNAL MEDICINE

## 2024-03-18 RX ORDER — FLUTICASONE PROPIONATE 50 MCG
2 SPRAY, SUSPENSION (ML) NASAL DAILY
Qty: 18.2 ML | Refills: 3 | Status: SHIPPED | OUTPATIENT
Start: 2024-03-18

## 2024-03-18 RX ORDER — LOSARTAN POTASSIUM 50 MG/1
50 TABLET ORAL DAILY
Qty: 90 TABLET | Refills: 1 | Status: SHIPPED | OUTPATIENT
Start: 2024-03-18

## 2024-04-19 NOTE — TELEPHONE ENCOUNTER
Pt submitted consent before welcome call placed. No contact made.    Smartphone: yes    [facility]: yes    Program consent: yes    Pulse oximeter status: pending p/u according to chart    Verified emergency contacts: yes    Program Overview: Reviewed , no response process, and importance of correct emergency contacts in event that well-being check is warranted. Patient will call [phone] 24/7 to speak with an OnCall nurse, if needed. Pt aware that if Sp02 <94% or if they have any worsening symptoms, they need to go to the emergency department. If they are having a medical emergency, they will call 911. Otherwise, patient will continue to submit data as scheduled. Reviewed importance of wearing mask if self or family members leave the house.     Patient had no further questions.      Reason for Disposition   Caller has cancelled the call before the first contact    Protocols used: NO CONTACT OR DUPLICATE CONTACT CALL-A-      
75

## 2024-05-07 ENCOUNTER — LAB VISIT (OUTPATIENT)
Dept: LAB | Facility: HOSPITAL | Age: 54
End: 2024-05-07
Attending: INTERNAL MEDICINE
Payer: COMMERCIAL

## 2024-05-07 DIAGNOSIS — E78.5 HYPERLIPIDEMIA, UNSPECIFIED HYPERLIPIDEMIA TYPE: ICD-10-CM

## 2024-05-07 DIAGNOSIS — D69.6 THROMBOCYTOPENIA: ICD-10-CM

## 2024-05-07 DIAGNOSIS — M10.9 GOUT, UNSPECIFIED CAUSE, UNSPECIFIED CHRONICITY, UNSPECIFIED SITE: ICD-10-CM

## 2024-05-07 DIAGNOSIS — I10 HYPERTENSION, UNSPECIFIED TYPE: ICD-10-CM

## 2024-05-07 LAB
ALBUMIN SERPL BCP-MCNC: 3.9 G/DL (ref 3.5–5.2)
ALP SERPL-CCNC: 26 U/L (ref 55–135)
ALT SERPL W/O P-5'-P-CCNC: 20 U/L (ref 10–44)
ANION GAP SERPL CALC-SCNC: 8 MMOL/L (ref 8–16)
AST SERPL-CCNC: 18 U/L (ref 10–40)
BASOPHILS # BLD AUTO: 0.02 K/UL (ref 0–0.2)
BASOPHILS NFR BLD: 0.4 % (ref 0–1.9)
BILIRUB SERPL-MCNC: 0.7 MG/DL (ref 0.1–1)
BUN SERPL-MCNC: 19 MG/DL (ref 6–20)
CALCIUM SERPL-MCNC: 9.6 MG/DL (ref 8.7–10.5)
CHLORIDE SERPL-SCNC: 104 MMOL/L (ref 95–110)
CHOLEST SERPL-MCNC: 221 MG/DL (ref 120–199)
CHOLEST/HDLC SERPL: 3.7 {RATIO} (ref 2–5)
CO2 SERPL-SCNC: 27 MMOL/L (ref 23–29)
CREAT SERPL-MCNC: 1 MG/DL (ref 0.5–1.4)
DIFFERENTIAL METHOD BLD: ABNORMAL
EOSINOPHIL # BLD AUTO: 0.3 K/UL (ref 0–0.5)
EOSINOPHIL NFR BLD: 5.3 % (ref 0–8)
ERYTHROCYTE [DISTWIDTH] IN BLOOD BY AUTOMATED COUNT: 12.2 % (ref 11.5–14.5)
EST. GFR  (NO RACE VARIABLE): >60 ML/MIN/1.73 M^2
GLUCOSE SERPL-MCNC: 96 MG/DL (ref 70–110)
HCT VFR BLD AUTO: 43.2 % (ref 40–54)
HDLC SERPL-MCNC: 59 MG/DL (ref 40–75)
HDLC SERPL: 26.7 % (ref 20–50)
HGB BLD-MCNC: 13.9 G/DL (ref 14–18)
IMM GRANULOCYTES # BLD AUTO: 0.02 K/UL (ref 0–0.04)
IMM GRANULOCYTES NFR BLD AUTO: 0.4 % (ref 0–0.5)
LDLC SERPL CALC-MCNC: 127.8 MG/DL (ref 63–159)
LYMPHOCYTES # BLD AUTO: 1.6 K/UL (ref 1–4.8)
LYMPHOCYTES NFR BLD: 32.9 % (ref 18–48)
MCH RBC QN AUTO: 32.1 PG (ref 27–31)
MCHC RBC AUTO-ENTMCNC: 32.2 G/DL (ref 32–36)
MCV RBC AUTO: 100 FL (ref 82–98)
MONOCYTES # BLD AUTO: 0.5 K/UL (ref 0.3–1)
MONOCYTES NFR BLD: 9.8 % (ref 4–15)
NEUTROPHILS # BLD AUTO: 2.5 K/UL (ref 1.8–7.7)
NEUTROPHILS NFR BLD: 51.2 % (ref 38–73)
NONHDLC SERPL-MCNC: 162 MG/DL
NRBC BLD-RTO: 0 /100 WBC
PLATELET # BLD AUTO: 135 K/UL (ref 150–450)
PMV BLD AUTO: 11.6 FL (ref 9.2–12.9)
POTASSIUM SERPL-SCNC: 4.5 MMOL/L (ref 3.5–5.1)
PROT SERPL-MCNC: 6.9 G/DL (ref 6–8.4)
RBC # BLD AUTO: 4.33 M/UL (ref 4.6–6.2)
SODIUM SERPL-SCNC: 139 MMOL/L (ref 136–145)
TRIGL SERPL-MCNC: 171 MG/DL (ref 30–150)
URATE SERPL-MCNC: 8.8 MG/DL (ref 3.4–7)
WBC # BLD AUTO: 4.92 K/UL (ref 3.9–12.7)

## 2024-05-07 PROCEDURE — 84550 ASSAY OF BLOOD/URIC ACID: CPT | Performed by: INTERNAL MEDICINE

## 2024-05-07 PROCEDURE — 85025 COMPLETE CBC W/AUTO DIFF WBC: CPT | Performed by: INTERNAL MEDICINE

## 2024-05-07 PROCEDURE — 36415 COLL VENOUS BLD VENIPUNCTURE: CPT | Mod: PN | Performed by: INTERNAL MEDICINE

## 2024-05-07 PROCEDURE — 80053 COMPREHEN METABOLIC PANEL: CPT | Performed by: INTERNAL MEDICINE

## 2024-05-07 PROCEDURE — 80061 LIPID PANEL: CPT | Performed by: INTERNAL MEDICINE

## 2024-05-08 NOTE — PROGRESS NOTES
I sent pt a my chart message -  I reviewed your labs.  Your uric acid level was high at 8.8. I would again recommend you consider starting a medication like allopurinol to decrease yoour uric acid level. Your Cholesterol looked slightly high.  The 10-year ASCVD risk score (Risk of having a cardiovascular event within 10 yrs) is: 7%.   I recommend a low cholesterol diet and exercise.  You can visit the American heart association website at heart.org for further info on a low cholesterol diet.   I recommend you consider starting a statin.  I would like to start you on Atorvastatin 20 mg daily. There are potential Side effects including muscle pain,  muscle weakness, and hyperglycemia.  Please let me know if you are willing to start this so I can send it to the pharm for you . You should call with any concerns.  Will cont to monitor. I recommend a repeat cholesterol panel in 6 mos.  Your kidney function and liver functions looked good.  You are remain borderline  anemic. Your platelets remain slightly low but stable at 135.  No further recommendations at this time.  Dr. VALE

## 2024-09-23 DIAGNOSIS — F41.9 ANXIETY: ICD-10-CM

## 2024-09-23 NOTE — TELEPHONE ENCOUNTER
No care due was identified.  [facility] Embedded Care Due Messages. Reference number: [id number].   9/23/2024 10:35:13 AM CDT

## 2024-09-24 RX ORDER — ALPRAZOLAM 0.25 MG/1
0.25 TABLET ORAL 3 TIMES DAILY PRN
Qty: 60 TABLET | Refills: 0 | Status: SHIPPED | OUTPATIENT
Start: 2024-09-24 | End: 2024-10-24

## 2024-09-29 NOTE — PROGRESS NOTES
[facility] Note    Chief Complaint      Chief Complaint   Patient presents with    Annual Exam       History of Present Illness      Jim Brown is a 54 y.o.   M with colon Ca, BPH, Gout, HTN, Anxiety, Male ED presents to fu chronic issues. Last visit - 3/18/24     HLD - cholesterol was slightly high. I rec a low cholesterol diet and exercise. Pt can visit the American heart association website at heart.org for further info on a low cholesterol diet.I recommend we repeat  cholesterol in 6 mos. Rec he consider Atorvastatin. He opted for lifestyle modification.  The 10-year ASCVD risk score (Risk of having a cardiovascular event within 10 yrs) is: 7.2%     Hyperuricemia - uric acid level was slightly high at 8.8- 5/7/24. We discussed allopurinol as a preventive measure and to  uric acid level. He opted not to start it.  Cont. to work on a low purine diet for gout prevention.         Thyroid nodule -   Thyroid U/S - 4/3/23 - 1.7 cm TI-RADS 2 nodule in the right lobe of the thyroid.  No FNA or surveillance is advised. Thyroid u/s - needs to reschedule.     Hepatic cysts - there was a Pancreatic cyst -  seen on prev CT C/A/P - 2/1/23 and not mentioned on recent CT or EUS. Fu with GI, , for further eval. EUS - 5/30/23 - A cyst was found in the subdiaphragmatic area of the liver and measured 17 mm by 10 mm. Will cont to monitor. He saw Dr. Webber 3/13/24     Fat containing inguinal hernias - noted on recent CT -2/29/24.  D/w pt.  Exam wnl.      Anemia -  H/H -  13.9/43.2 - 5/7/24 - resolved. Folic acid was normal and Vitamin B12 was low normal at 439. Iron studies were normal.       BPH - Fu by [facility], Dr. Johnson. Pt on Flomax.  Doing ok.  No dizziness.  PSA was normal at 2.6-11/6/23. Fu with Dr. Alex BARRAZA.  MRI Prostate - 12/29/22 - BPH and peripheral zone heterogeneity.  No focal lesion concerning for prostate cancer.      Thrombocytopenia - 135- Stable since 2010.      H/o Stage I Colon Ca -  Fu by Dr. Webber.  CEA < 1.7  - 1/11/23.  S/p partial colectomy - 1/2022. CT CAP - 2/29/24- Stable right thyroid nodule. Minimal atherosclerosis of the coronary arteries. Multiple bilateral solid pulmonary nodules, stable in size and number compared to prior exam: Stable 1.0 cm subcarinal lymph node. Mildly enlarged measuring 14.6 cm . Small accessory splenule. Prostatomegaly with punctate internal calcifications. Mild multilevel degenerative change. Prominent fat in inguinal canals. Hepatomegaly. Stable 1.5 cm cyst in the left hepatic lobe.  Had fu with Dr. Webber 3/13/24 and reports he was told to repeat C-scope in '25.      HTN - Pt controlled on Losartan 50 mg/d.  Prev had cough on Lisinopril.      Anxiety - Pt on Xanax daily-BID prn. Rarely needs this when stressed and caring for his Mom. Rx when dx with Colon ca. 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 Has been caring for his Mom with dementia.  It gets frustrating. He does not wish to be on Zoloft. He was on it in the past and felt it affected his sex drive and caused anorgasmia. 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 Could try Buspirone in future if needed.      Male ED - Pt on Viagra prn. Will check testosterone.     Obesity - BMI - 36.15 - Pt has been dieting and exercising. Frustrated by lack of wt. Will check TSH. Rec consider weight watchers or [employer].  Pt not interested in bariatric medicine.      HCM - Flu - none - declines;  Tdap - admin 10/1/24;  PCV 13 - none;  PVX 23 - none;  Shingrix - none - due - defers;  COVID - 19 Vaccine #1 none -declines; Hep C Screen - 11/2/22 - neg.; HIV Screen - none - declines; C-scope 10/11/22 - + polyps - repeat 3 yrs;  PSA - 2.6- 11/6/23; Prev PCP - Dr. Myers; CRS - Dr. Webber;   - Dr. Johnson; Podiatrist - Dr. Simon; Well visit - 10/1/24    Patient Care Team:  Viviana Bee MD as PCP - General (Internal Medicine)  Joanie Mena LPN (Inactive)  as Licensed Practical Nurse (Internal Medicine)  Joanie Mena LPN (Inactive) as Care Coordinator (Internal Medicine)  Jose Juan Simon DPM (Podiatry)     Health Maintenance:  Immunization History   Administered Date(s) Administered    Tdap 11/12/2014, 10/01/2024      Health Maintenance   Topic Date Due    Shingles Vaccine (1 of 2) Never done    Colorectal Cancer Screening  10/11/2025    Lipid Panel  05/07/2029    TETANUS VACCINE  10/01/2034    Hepatitis C Screening  Completed        Past Medical History:  Past Medical History:   Diagnosis Date    COVID-19     12/2020    Hyperlipidemia 11/14/2014 11/13/2014:     Hypertension        Past Surgical History:   has a past surgical history that includes Colonoscopy (08/10/2015); Tonsillectomy; Colonoscopy (N/A, 11/23/2021); Robot-assisted low anterior resection of colon (01/13/2022); Mobilization of splenic flexure (01/13/2022); Flexible sigmoidoscopy (01/13/2022); Colon surgery; Colonoscopy (N/A, 10/11/2022); and Endoscopic ultrasound of upper gastrointestinal tract (N/A, 5/30/2023).    Family History:  family history includes Cancer in his maternal grandfather and paternal grandmother; Colon cancer in his paternal grandmother; Coronary artery disease in his maternal grandfather; Dementia in his mother; Heart attack (age of onset: 55) in his maternal grandfather.     Social History:  Social History     Tobacco Use    Smoking status: Never    Smokeless tobacco: Never   Substance Use Topics    Alcohol use: Yes     Alcohol/week: 3.0 standard drinks of alcohol     Types: 3 Drinks containing 0.5 oz of alcohol per week     Comment: 2-3 on weekends    Drug use: Never       Review of Systems   Constitutional:  Positive for fatigue. Negative for chills, fever and night sweats.   HENT:  Negative for hearing loss.    Eyes:  Negative for visual disturbance.   Respiratory:  Negative for apnea, chest tightness and shortness of breath.         No known apnea.  Rarely  snores.    Cardiovascular:  Positive for palpitations. Negative for chest pain and leg swelling.        Only when anxious   Gastrointestinal:  Negative for abdominal distention, abdominal pain, blood in stool, constipation, diarrhea, nausea, vomiting and reflux.   Endocrine: Positive for polydipsia. Negative for cold intolerance and heat intolerance.        Drinks 3 L water/d.   Genitourinary:         +Male ED.  Dec libido.   Musculoskeletal:  Negative for arthralgias and myalgias.   Neurological:  Negative for dizziness and headaches.   Psychiatric/Behavioral:  Positive for sleep disturbance.         Has trouble falling asleep and staying asleep. If wakes to urinate can't go back to sleep.         Medications:    Current Outpatient Medications:     ALPRAZolam (XANAX) 0.25 MG tablet, Take 1 tablet (0.25 mg total) by mouth 3 (three) times daily as needed (anxiety)., Disp: 60 tablet, Rfl: 0    ascorbic acid, vitamin C, (VITAMIN C) 500 MG tablet, Take 1 tablet (500 mg total) by mouth 2 (two) times daily., Disp:  , Rfl:     fluticasone propionate (FLONASE) 50 mcg/actuation nasal spray, 2 sprays (100 mcg total) by Each Nostril route once daily., Disp: 18.2 mL, Rfl: 3    multivitamin Tab, Take 1 tablet by mouth once daily., Disp: 30 tablet, Rfl: 11    sildenafiL (VIAGRA) 100 MG tablet, Take 1 tablet (100 mg total) by mouth daily as needed., Disp: 30 tablet, Rfl: 11    sod sulf-pot chloride-mag sulf (SUTAB) 1.479-0.188- 0.225 gram tablet, Take 12 tablets by mouth once daily. Take according to package instructions with indicated amount of water., Disp: 24 tablet, Rfl: 0    tamsulosin (FLOMAX) 0.4 mg Cap, Take 1 capsule (0.4 mg total) by mouth once daily., Disp: 30 capsule, Rfl: 12    albuterol (PROVENTIL HFA) 90 mcg/actuation inhaler, Inhale 2 puffs into the lungs every 6 (six) hours as needed for Wheezing. Rescue, Disp: 18 g, Rfl: 1    losartan (COZAAR) 50 MG tablet, Take 1 tablet (50 mg total) by mouth once daily., Disp:  "90 tablet, Rfl: 1  No current facility-administered medications for this visit.    Facility-Administered Medications Ordered in Other Visits:     gabapentin capsule 300 mg, 300 mg, Oral, TID, Didi Claros NP, 300 mg at 01/18/22 0887     Allergies:  Review of patient's allergies indicates:   Allergen Reactions    Lisinopril Other (See Comments)     cough       Physical Exam      Vital Signs  Temp: 97.7 °F (36.5 °C)  Temp Source: Temporal  Pulse: 81  Resp: 20  SpO2: 99 %  BP: 138/80  BP Location: Right arm  Patient Position: Sitting  Pain Score: 0-No pain  Height and Weight  Height: 6' 2" (188 cm)  Weight: 127.7 kg (281 lb 8.4 oz)  BSA (Calculated - sq m): 2.58 sq meters  BMI (Calculated): 36.1  Weight in (lb) to have BMI = 25: 194.3      Patient Position: Sitting      Physical Exam  Vitals reviewed.   Constitutional:       General: He is not in acute distress.     Appearance: Normal appearance. He is obese. He is not ill-appearing, toxic-appearing or diaphoretic.   HENT:      Head: Normocephalic and atraumatic.      Right Ear: Tympanic membrane normal.      Left Ear: Tympanic membrane normal.   Eyes:      Extraocular Movements: Extraocular movements intact.      Conjunctiva/sclera: Conjunctivae normal.      Pupils: Pupils are equal, round, and reactive to light.   Neck:      Vascular: No carotid bruit.   Cardiovascular:      Rate and Rhythm: Normal rate and regular rhythm.      Pulses: Normal pulses.      Heart sounds: Normal heart sounds. No murmur heard.  Pulmonary:      Effort: No respiratory distress.      Breath sounds: Normal breath sounds. No wheezing.   Abdominal:      General: Bowel sounds are normal. There is no distension.      Palpations: Abdomen is soft.      Tenderness: There is no abdominal tenderness. There is no guarding or rebound.   Musculoskeletal:         General: Normal range of motion.      Cervical back: Neck supple.   Skin:     General: Skin is warm.   Neurological:      General: No " focal deficit present.      Mental Status: He is alert and oriented to person, place, and time.   Psychiatric:         Mood and Affect: Mood normal.         Behavior: Behavior normal.          Laboratory:  CBC:  Recent Labs   Lab 11/02/22  0912 11/06/23  0841 05/07/24  0939   WBC 5.59 6.70 4.92   RBC 4.38 L 4.59 L 4.33 L   Hemoglobin 13.7 L 14.8 13.9 L   Hematocrit 43.0 44.5 43.2   Platelets 128 L 142 L 135 L   MCV 98 97 100 H   MCH 31.3 H 32.2 H 32.1 H   MCHC 31.9 L 33.3 32.2       CMP:  Recent Labs   Lab 11/02/22  0912 03/20/23  1115 11/06/23  0841 05/07/24  0939   Glucose 102   < > 98 96   Calcium 9.3   < > 9.3 9.6   Albumin 3.9  --  4.1 3.9   Total Protein 6.7  --  7.3 6.9   Sodium 140   < > 141 139   Potassium 4.4   < > 4.4 4.5   CO2 26   < > 24 27   Chloride 105   < > 105 104   BUN 17   < > 23 H 19   Creatinine 0.9   < > 1.1 1.0   Alkaline Phosphatase 26 L  --  30 L 26 L   ALT 21  --  21 20   AST 18  --  20 18   Total Bilirubin 0.6  --  0.5 0.7    < > = values in this interval not displayed.       LIPIDS:  Recent Labs   Lab 11/02/22  0912 11/06/23  0841 05/07/24  0939   TSH 1.608 1.493  --    HDL 48 56 59   Cholesterol 204 H 218 H 221 H   Triglycerides 119 72 171 H   LDL Cholesterol 132.2 147.6 127.8   HDL/Cholesterol Ratio 23.5 25.7 26.7   Non-HDL Cholesterol 156 162 162   Total Cholesterol/HDL Ratio 4.3 3.9 3.7       TSH:  Recent Labs   Lab 11/02/22  0912 11/06/23  0841   TSH 1.608 1.493       A1C:  Recent Labs   Lab 11/02/22  0912 11/06/23  0841   Hemoglobin A1C 5.1 5.1       Other:   Recent Labs   Lab 03/20/23  1115   Vitamin B-12 439   Ferritin 232   Iron 117   Transferrin 238   TIBC 352   Saturated Iron 33     Recent Labs   Lab 11/02/22  0912 11/06/23  0841   PSA, Screen 3.1 2.6   Hepatitis C Ab Non-reactive  --        Assessment/Plan     Jim Brown is a 54 y.o.male with:    Normal physical exam, routine  -     Comprehensive Metabolic Panel; Future; Expected date: 11/07/2024  -     CBC Auto  Differential; Future; Expected date: 11/07/2024  -     TSH; Future; Expected date: 11/07/2024  -     Hemoglobin A1C; Future; Expected date: 11/07/2024  - Performed today.  Will check Basic labs.  RTC in 1 yr for fu or sooner if needed    Hypertension, unspecified type  -     losartan (COZAAR) 50 MG tablet; Take 1 tablet (50 mg total) by mouth once daily.  Dispense: 90 tablet; Refill: 1  - Controlled.  Cont current.     Hyperlipidemia, unspecified hyperlipidemia type  -     Lipid Panel; Future; Expected date: 11/07/2024  - Rec low chol diet and exercise. Pt eats healthily.  Repeat FLP. He declines statin for now.     Thrombocytopenia  - Stable.  Cont to monitor.     Thyroid nodule  -Repeat Thyroid U/S.     Gout, unspecified cause, unspecified chronicity, unspecified site  -     URIC ACID; Future; Expected date: 11/07/2024  - Stable. Repeat UA level.     Male erectile disorder  -     TESTOSTERONE; Future; Expected date: 11/07/2024  - Check Testosterone level at 8 am. Cont Sildenafil prn.     H/O wheezing  -     albuterol (PROVENTIL HFA) 90 mcg/actuation inhaler; Inhale 2 puffs into the lungs every 6 (six) hours as needed for Wheezing. Rescue  Dispense: 18 g; Refill: 1  - Stable.  Cont current regimen.    Macrocytosis  -     Vitamin B12; Future; Expected date: 11/07/2024  -Repeat B12 level.     Prostate cancer screening  -     PSA, Screening; Future; Expected date: 11/07/2024    Need for diphtheria-tetanus-pertussis (Tdap) vaccine   -     DIPH,PERTUSS(ACEL),TET VAC(PF)(ADULT)(ADACEL)(TDaP)  - Admin today    Chronic conditions status updated as per HPI.  Other than changes above, cont current medications and maintain follow up with specialists.    Follow up in about 6 months (around 4/1/2025).      Viviana Bee MD  [facility]

## 2024-10-01 ENCOUNTER — OFFICE VISIT (OUTPATIENT)
Dept: PRIMARY CARE CLINIC | Facility: CLINIC | Age: 54
End: 2024-10-01
Payer: COMMERCIAL

## 2024-10-01 VITALS
DIASTOLIC BLOOD PRESSURE: 80 MMHG | HEIGHT: 74 IN | TEMPERATURE: 98 F | BODY MASS INDEX: 36.13 KG/M2 | OXYGEN SATURATION: 99 % | RESPIRATION RATE: 20 BRPM | HEART RATE: 81 BPM | SYSTOLIC BLOOD PRESSURE: 138 MMHG | WEIGHT: 281.5 LBS

## 2024-10-01 DIAGNOSIS — Z12.5 PROSTATE CANCER SCREENING: ICD-10-CM

## 2024-10-01 DIAGNOSIS — E04.1 THYROID NODULE: ICD-10-CM

## 2024-10-01 DIAGNOSIS — Z87.898 H/O WHEEZING: ICD-10-CM

## 2024-10-01 DIAGNOSIS — D69.6 THROMBOCYTOPENIA: ICD-10-CM

## 2024-10-01 DIAGNOSIS — E78.5 HYPERLIPIDEMIA, UNSPECIFIED HYPERLIPIDEMIA TYPE: ICD-10-CM

## 2024-10-01 DIAGNOSIS — Z00.00 NORMAL PHYSICAL EXAM, ROUTINE: Primary | ICD-10-CM

## 2024-10-01 DIAGNOSIS — M10.9 GOUT, UNSPECIFIED CAUSE, UNSPECIFIED CHRONICITY, UNSPECIFIED SITE: ICD-10-CM

## 2024-10-01 DIAGNOSIS — Z23 NEED FOR DIPHTHERIA-TETANUS-PERTUSSIS (TDAP) VACCINE: ICD-10-CM

## 2024-10-01 DIAGNOSIS — N52.9 MALE ERECTILE DISORDER: ICD-10-CM

## 2024-10-01 DIAGNOSIS — D75.89 MACROCYTOSIS: ICD-10-CM

## 2024-10-01 DIAGNOSIS — I10 HYPERTENSION, UNSPECIFIED TYPE: ICD-10-CM

## 2024-10-01 PROCEDURE — 3008F BODY MASS INDEX DOCD: CPT | Mod: CPTII,S$GLB,, | Performed by: INTERNAL MEDICINE

## 2024-10-01 PROCEDURE — 1160F RVW MEDS BY RX/DR IN RCRD: CPT | Mod: CPTII,S$GLB,, | Performed by: INTERNAL MEDICINE

## 2024-10-01 PROCEDURE — 3075F SYST BP GE 130 - 139MM HG: CPT | Mod: CPTII,S$GLB,, | Performed by: INTERNAL MEDICINE

## 2024-10-01 PROCEDURE — 90715 TDAP VACCINE 7 YRS/> IM: CPT | Mod: S$GLB,,, | Performed by: INTERNAL MEDICINE

## 2024-10-01 PROCEDURE — 90471 IMMUNIZATION ADMIN: CPT | Mod: S$GLB,,, | Performed by: INTERNAL MEDICINE

## 2024-10-01 PROCEDURE — 3079F DIAST BP 80-89 MM HG: CPT | Mod: CPTII,S$GLB,, | Performed by: INTERNAL MEDICINE

## 2024-10-01 PROCEDURE — 4010F ACE/ARB THERAPY RXD/TAKEN: CPT | Mod: CPTII,S$GLB,, | Performed by: INTERNAL MEDICINE

## 2024-10-01 PROCEDURE — 1159F MED LIST DOCD IN RCRD: CPT | Mod: CPTII,S$GLB,, | Performed by: INTERNAL MEDICINE

## 2024-10-01 PROCEDURE — 99396 PREV VISIT EST AGE 40-64: CPT | Mod: 25,S$GLB,, | Performed by: INTERNAL MEDICINE

## 2024-10-01 PROCEDURE — 99999 PR PBB SHADOW E&M-EST. PATIENT-LVL IV: CPT | Mod: PBBFAC,,, | Performed by: INTERNAL MEDICINE

## 2024-10-01 RX ORDER — ALBUTEROL SULFATE 90 UG/1
2 INHALANT RESPIRATORY (INHALATION) EVERY 6 HOURS PRN
Qty: 18 G | Refills: 1 | Status: SHIPPED | OUTPATIENT
Start: 2024-10-01 | End: 2025-10-01

## 2024-10-01 RX ORDER — LOSARTAN POTASSIUM 50 MG/1
50 TABLET ORAL DAILY
Qty: 90 TABLET | Refills: 1 | Status: SHIPPED | OUTPATIENT
Start: 2024-10-01

## 2024-11-12 ENCOUNTER — LAB VISIT (OUTPATIENT)
Dept: LAB | Facility: HOSPITAL | Age: 54
End: 2024-11-12
Attending: INTERNAL MEDICINE
Payer: COMMERCIAL

## 2024-11-12 DIAGNOSIS — Z12.5 PROSTATE CANCER SCREENING: ICD-10-CM

## 2024-11-12 DIAGNOSIS — Z00.00 NORMAL PHYSICAL EXAM, ROUTINE: ICD-10-CM

## 2024-11-12 DIAGNOSIS — D75.89 MACROCYTOSIS: ICD-10-CM

## 2024-11-12 DIAGNOSIS — N52.9 MALE ERECTILE DISORDER: ICD-10-CM

## 2024-11-12 DIAGNOSIS — E78.5 HYPERLIPIDEMIA, UNSPECIFIED HYPERLIPIDEMIA TYPE: ICD-10-CM

## 2024-11-12 DIAGNOSIS — M10.9 GOUT, UNSPECIFIED CAUSE, UNSPECIFIED CHRONICITY, UNSPECIFIED SITE: ICD-10-CM

## 2024-11-12 LAB
ALBUMIN SERPL BCP-MCNC: 4.1 G/DL (ref 3.5–5.2)
ALP SERPL-CCNC: 28 U/L (ref 40–150)
ALT SERPL W/O P-5'-P-CCNC: 22 U/L (ref 10–44)
ANION GAP SERPL CALC-SCNC: 8 MMOL/L (ref 8–16)
AST SERPL-CCNC: 22 U/L (ref 10–40)
BASOPHILS # BLD AUTO: 0.03 K/UL (ref 0–0.2)
BASOPHILS NFR BLD: 0.5 % (ref 0–1.9)
BILIRUB SERPL-MCNC: 0.6 MG/DL (ref 0.1–1)
BUN SERPL-MCNC: 19 MG/DL (ref 6–20)
CALCIUM SERPL-MCNC: 9.7 MG/DL (ref 8.7–10.5)
CHLORIDE SERPL-SCNC: 105 MMOL/L (ref 95–110)
CHOLEST SERPL-MCNC: 213 MG/DL (ref 120–199)
CHOLEST/HDLC SERPL: 3.9 {RATIO} (ref 2–5)
CO2 SERPL-SCNC: 28 MMOL/L (ref 23–29)
COMPLEXED PSA SERPL-MCNC: 5 NG/ML (ref 0–4)
CREAT SERPL-MCNC: 0.9 MG/DL (ref 0.5–1.4)
DIFFERENTIAL METHOD BLD: ABNORMAL
EOSINOPHIL # BLD AUTO: 0.6 K/UL (ref 0–0.5)
EOSINOPHIL NFR BLD: 10.3 % (ref 0–8)
ERYTHROCYTE [DISTWIDTH] IN BLOOD BY AUTOMATED COUNT: 11.9 % (ref 11.5–14.5)
EST. GFR  (NO RACE VARIABLE): >60 ML/MIN/1.73 M^2
ESTIMATED AVG GLUCOSE: 94 MG/DL (ref 68–131)
GLUCOSE SERPL-MCNC: 94 MG/DL (ref 70–110)
HBA1C MFR BLD: 4.9 % (ref 4–5.6)
HCT VFR BLD AUTO: 42.7 % (ref 40–54)
HDLC SERPL-MCNC: 55 MG/DL (ref 40–75)
HDLC SERPL: 25.8 % (ref 20–50)
HGB BLD-MCNC: 13.9 G/DL (ref 14–18)
IMM GRANULOCYTES # BLD AUTO: 0.02 K/UL (ref 0–0.04)
IMM GRANULOCYTES NFR BLD AUTO: 0.3 % (ref 0–0.5)
LDLC SERPL CALC-MCNC: 131 MG/DL (ref 63–159)
LYMPHOCYTES # BLD AUTO: 2.1 K/UL (ref 1–4.8)
LYMPHOCYTES NFR BLD: 33.6 % (ref 18–48)
MCH RBC QN AUTO: 32.4 PG (ref 27–31)
MCHC RBC AUTO-ENTMCNC: 32.6 G/DL (ref 32–36)
MCV RBC AUTO: 100 FL (ref 82–98)
MONOCYTES # BLD AUTO: 0.6 K/UL (ref 0.3–1)
MONOCYTES NFR BLD: 9.7 % (ref 4–15)
NEUTROPHILS # BLD AUTO: 2.8 K/UL (ref 1.8–7.7)
NEUTROPHILS NFR BLD: 45.6 % (ref 38–73)
NONHDLC SERPL-MCNC: 158 MG/DL
NRBC BLD-RTO: 0 /100 WBC
PLATELET # BLD AUTO: 123 K/UL (ref 150–450)
PMV BLD AUTO: 11.6 FL (ref 9.2–12.9)
POTASSIUM SERPL-SCNC: 4.6 MMOL/L (ref 3.5–5.1)
PROT SERPL-MCNC: 7 G/DL (ref 6–8.4)
RBC # BLD AUTO: 4.29 M/UL (ref 4.6–6.2)
SODIUM SERPL-SCNC: 141 MMOL/L (ref 136–145)
TESTOST SERPL-MCNC: 509 NG/DL (ref 304–1227)
TRIGL SERPL-MCNC: 135 MG/DL (ref 30–150)
TSH SERPL DL<=0.005 MIU/L-ACNC: 2.58 UIU/ML (ref 0.4–4)
URATE SERPL-MCNC: 9.1 MG/DL (ref 3.4–7)
VIT B12 SERPL-MCNC: 399 PG/ML (ref 210–950)
WBC # BLD AUTO: 6.19 K/UL (ref 3.9–12.7)

## 2024-11-12 PROCEDURE — 82607 VITAMIN B-12: CPT | Performed by: INTERNAL MEDICINE

## 2024-11-12 PROCEDURE — 80061 LIPID PANEL: CPT | Performed by: INTERNAL MEDICINE

## 2024-11-12 PROCEDURE — 36415 COLL VENOUS BLD VENIPUNCTURE: CPT | Mod: PN | Performed by: INTERNAL MEDICINE

## 2024-11-12 PROCEDURE — 84403 ASSAY OF TOTAL TESTOSTERONE: CPT | Performed by: INTERNAL MEDICINE

## 2024-11-12 PROCEDURE — 85025 COMPLETE CBC W/AUTO DIFF WBC: CPT | Performed by: INTERNAL MEDICINE

## 2024-11-12 PROCEDURE — 84550 ASSAY OF BLOOD/URIC ACID: CPT | Performed by: INTERNAL MEDICINE

## 2024-11-12 PROCEDURE — 84153 ASSAY OF PSA TOTAL: CPT | Performed by: INTERNAL MEDICINE

## 2024-11-12 PROCEDURE — 84443 ASSAY THYROID STIM HORMONE: CPT | Performed by: INTERNAL MEDICINE

## 2024-11-12 PROCEDURE — 80053 COMPREHEN METABOLIC PANEL: CPT | Performed by: INTERNAL MEDICINE

## 2024-11-12 PROCEDURE — 83036 HEMOGLOBIN GLYCOSYLATED A1C: CPT | Performed by: INTERNAL MEDICINE

## 2024-11-13 NOTE — PROGRESS NOTES
I sent pt a my chart message -  I reviewed your labs.  Your Prostate specific antigen was elevated at 5. I recommend you set up a follow up with your Urologist,  Dr. Johnson,  to further evaluate this. You remain borderline anemic and your platelet remain slightly low but this is stable for you. Your cholesterol was borderline high. The 10-year ASCVD risk score (Risk of having a cardiovascular event within 10 yrs) is: 7.4%.  I recommend a low cholesterol diet and exercise.  You can visit the American heart association website at heart.org for further info on a low cholesterol diet.   I recommend you consider starting a statin such as, Atorvastatin 20 mg daily. There are potential Side effects including muscle pain,  muscle weakness, and hyperglycemia.  Please let me know if you are willing to start this so I can send it to the pharm for you. You should call with any concerns.  Will cont to monitor. I recommend a repeat cholesterol panel in 6 mos.   Your uric acid remains elevated at 9.1. Let me know if you have changed your mind about starting allopurinol. Your kidney and liver function looked good.  Your testosterone was normal. Your Vitamin B12 was low at 399.  I recommend you start OTC Vitamin B12 1000 mcg daily. Your Thyroid function was normal.  Your Ha1c was normal at 4.9.  Dr. VALE

## 2024-11-14 ENCOUNTER — PATIENT MESSAGE (OUTPATIENT)
Dept: UROLOGY | Facility: CLINIC | Age: 54
End: 2024-11-14
Payer: COMMERCIAL

## 2024-11-15 ENCOUNTER — TELEPHONE (OUTPATIENT)
Dept: UROLOGY | Facility: CLINIC | Age: 54
End: 2024-11-15
Payer: COMMERCIAL

## 2024-11-20 ENCOUNTER — OFFICE VISIT (OUTPATIENT)
Dept: UROLOGY | Facility: CLINIC | Age: 54
End: 2024-11-20
Payer: COMMERCIAL

## 2024-11-20 ENCOUNTER — PATIENT MESSAGE (OUTPATIENT)
Dept: UROLOGY | Facility: CLINIC | Age: 54
End: 2024-11-20

## 2024-11-20 VITALS
HEIGHT: 74 IN | BODY MASS INDEX: 35.94 KG/M2 | WEIGHT: 280 LBS | SYSTOLIC BLOOD PRESSURE: 152 MMHG | DIASTOLIC BLOOD PRESSURE: 93 MMHG | HEART RATE: 93 BPM

## 2024-11-20 DIAGNOSIS — R97.20 ELEVATED PSA: Primary | ICD-10-CM

## 2024-11-20 PROCEDURE — 99999 PR PBB SHADOW E&M-EST. PATIENT-LVL III: CPT | Mod: PBBFAC,,, | Performed by: STUDENT IN AN ORGANIZED HEALTH CARE EDUCATION/TRAINING PROGRAM

## 2024-11-20 RX ORDER — MELOXICAM 15 MG/1
15 TABLET ORAL DAILY PRN
COMMUNITY
Start: 2024-07-23

## 2024-11-20 RX ORDER — SILDENAFIL 100 MG/1
100 TABLET, FILM COATED ORAL DAILY PRN
Qty: 30 TABLET | Refills: 11 | Status: SHIPPED | OUTPATIENT
Start: 2024-11-20 | End: 2024-11-20

## 2024-11-20 RX ORDER — SILDENAFIL 100 MG/1
100 TABLET, FILM COATED ORAL DAILY PRN
Qty: 30 TABLET | Refills: 11 | Status: SHIPPED | OUTPATIENT
Start: 2024-11-20 | End: 2025-11-20

## 2024-11-20 RX ORDER — TAMSULOSIN HYDROCHLORIDE 0.4 MG/1
0.4 CAPSULE ORAL DAILY
Qty: 30 CAPSULE | Refills: 12 | Status: SHIPPED | OUTPATIENT
Start: 2024-11-20

## 2024-11-20 NOTE — PROGRESS NOTES
Jim Brown is a pleasant 54 y.o. male presenting for evaluation of elevated PSA, BPH and erectile dysfunction.     HPI:   It was great to meet Jim today.  He is a former patient of Dr. Johnson.  His last visit was on 02/15/2024.  His PSA has been monitored for many years.  Most recently has fluctuated between 2.5 and 3.1, until it increased to 5.0 on 11/12/2024.  He has a prostate MRI from December of 2022 that demonstrated a PI-RADS 2 lesion in a 57 cc gland.  He denies any family history of prostate cancer, but does note that his father had renal cancer.    He denies concerns with daytime urination and is taking his Flomax. He denies hematuria, dysuria, fevers or renal colic.  He does report nocturia once per night.  He occasionally drinks alcohol in the evenings.  He has not drink caffeine after 1:00 p.m..  He does cut back on fluids before bed.  He reports that he had been referred to sleep medicine previously for an BEBA evaluation and was planned to undergo a sleep study, however he never followed up for this.    He reports that Viagra is working quite well for his erectile dysfunction.    His surgical history is notable for a LAR for rectal cancer.    ROS: as per HPI    Family History   Problem Relation Name Age of Onset    Dementia Mother      Cancer Maternal Grandfather      Coronary artery disease Maternal Grandfather      Heart attack Maternal Grandfather  55    Colon cancer Paternal Grandmother      Cancer Paternal Grandmother          colon cancer    Esophageal cancer Neg Hx       Social History     Tobacco Use   Smoking Status Never   Smokeless Tobacco Never            Physical Exam    General: No acute distress. Nontoxic appearing.  HENT: Normocephalic. Atraumatic.  Respiratory: Normal respiratory effort. No conversational dyspnea. No audible wheezing.  Abdomen: No obvious distension.  Skin: No visible abnormalities.  Extremities: No edema upper extremities. No edema lower  extremities.  Neurological: Alert and oriented x3. Normal speech.  Psychiatric: Normal mood. Normal affect. No evidence of SI.     Data review  Prior internal/external notes have been reviewed: Yes  Care Everywhere reviewed for external records:  Yes    Pertinent labs and imaging independently reviewed include:   PSA as per HPI   Testosterone 509   A1c 4.9   Creatinine 0.9, EGFR greater than 60    Problems addressed during today's encounter  Elevated PSA (Complexity: Moderate; Chronic; Unstable/Progressing)  BPH/LUTS (Complexity: Mild; Chronic; Unstable/Progressing)   Erectile dysfunction (Complexity: Mild; Chronic; Stable)   Obesity    Plan for problems listed above includes:   We will plan to repeat a PSA in 6 weeks.  Pending the results of that, we may consider repeating an MRI of the prostate.  Continue Flomax for his BPH/LUTS.  We reviewed lifestyle modifications to combat nocturia.  I encouraged him to follow up with his sleep medicine team and complete his sleep study.  Continue Viagra for erectile dysfunction   He has been working on weight loss.  He was congratulated 39 is accomplished and encouraged to continue given his BMI of 36.  A referral to either a dietitian or weight management clinic was offered today, but he declined at this time.      Risk for nontreatment of mentioned condition(s) includes, but is not limited to:   Continuation or worsening of the current condition(s)  Possible missed diagnosis of malignancy  Impaired sexual function    Patient risk factors for management (e.g., age, history, comorbidities) include:   Age    Treatment requiring monitoring for toxicity includes:   Oral or topical medication    Further evaluation ordered today:   Lab work    Dictation is typically used for these notes, such that spelling/grammatical errors may be related to interpretation of spoken word.    Jose Maria Bourgeois MD

## 2024-12-24 DIAGNOSIS — I10 HYPERTENSION, UNSPECIFIED TYPE: ICD-10-CM

## 2024-12-24 RX ORDER — LOSARTAN POTASSIUM 50 MG/1
50 TABLET ORAL
Qty: 90 TABLET | Refills: 3 | OUTPATIENT
Start: 2024-12-24

## 2024-12-24 NOTE — TELEPHONE ENCOUNTER
No care due was identified.  Health [facility] Embedded Care Due Messages. Reference number: [id number].   12/24/2024 3:21:26 AM CST

## 2024-12-24 NOTE — TELEPHONE ENCOUNTER
Refill Routing Note   Medication(s) are not appropriate for processing by [facility] for the following reason(s):        Required vitals abnormal    [facility] action(s):  Defer               Appointments  past 12m or future 3m with PCP    Date Provider   Last Visit   10/1/2024 Viviana Bee MD   Next Visit   Visit date not found Viviana Bee MD   ED visits in past 90 days: 0        Note composed:9:24 AM 12/24/2024

## 2025-01-06 ENCOUNTER — LAB VISIT (OUTPATIENT)
Dept: LAB | Facility: HOSPITAL | Age: 55
End: 2025-01-06
Attending: STUDENT IN AN ORGANIZED HEALTH CARE EDUCATION/TRAINING PROGRAM
Payer: COMMERCIAL

## 2025-01-06 DIAGNOSIS — R97.20 ELEVATED PSA: ICD-10-CM

## 2025-01-06 LAB — COMPLEXED PSA SERPL-MCNC: 4.2 NG/ML (ref 0–4)

## 2025-01-06 PROCEDURE — 84153 ASSAY OF PSA TOTAL: CPT | Performed by: STUDENT IN AN ORGANIZED HEALTH CARE EDUCATION/TRAINING PROGRAM

## 2025-01-06 PROCEDURE — 36415 COLL VENOUS BLD VENIPUNCTURE: CPT | Mod: PN | Performed by: STUDENT IN AN ORGANIZED HEALTH CARE EDUCATION/TRAINING PROGRAM

## 2025-01-07 ENCOUNTER — PATIENT MESSAGE (OUTPATIENT)
Dept: UROLOGY | Facility: CLINIC | Age: 55
End: 2025-01-07
Payer: COMMERCIAL

## 2025-01-07 DIAGNOSIS — R97.20 ELEVATED PSA: Primary | ICD-10-CM

## 2025-01-14 ENCOUNTER — PATIENT MESSAGE (OUTPATIENT)
Dept: UROLOGY | Facility: CLINIC | Age: 55
End: 2025-01-14
Payer: COMMERCIAL

## 2025-01-28 DIAGNOSIS — F41.9 ANXIETY: ICD-10-CM

## 2025-01-28 RX ORDER — ALPRAZOLAM 0.25 MG/1
0.25 TABLET ORAL 3 TIMES DAILY PRN
Qty: 60 TABLET | Refills: 0 | Status: SHIPPED | OUTPATIENT
Start: 2025-01-28 | End: 2025-02-27

## 2025-01-28 NOTE — TELEPHONE ENCOUNTER
No care due was identified.  Health [facility] Embedded Care Due Messages. Reference number: [id number].   1/28/2025 9:46:32 AM CST

## 2025-01-29 ENCOUNTER — HOSPITAL ENCOUNTER (OUTPATIENT)
Dept: RADIOLOGY | Facility: HOSPITAL | Age: 55
Discharge: HOME | End: 2025-01-29
Attending: STUDENT IN AN ORGANIZED HEALTH CARE EDUCATION/TRAINING PROGRAM
Payer: COMMERCIAL

## 2025-01-29 ENCOUNTER — PATIENT MESSAGE (OUTPATIENT)
Dept: UROLOGY | Facility: CLINIC | Age: 55
End: 2025-01-29
Payer: COMMERCIAL

## 2025-01-29 DIAGNOSIS — R97.20 ELEVATED PSA: ICD-10-CM

## 2025-01-29 PROCEDURE — 25500020 PHARM REV CODE 255: Performed by: STUDENT IN AN ORGANIZED HEALTH CARE EDUCATION/TRAINING PROGRAM

## 2025-01-29 PROCEDURE — 72197 MRI PELVIS W/O & W/DYE: CPT | Mod: TC

## 2025-01-29 PROCEDURE — 72197 MRI PELVIS W/O & W/DYE: CPT | Mod: 26,,, | Performed by: STUDENT IN AN ORGANIZED HEALTH CARE EDUCATION/TRAINING PROGRAM

## 2025-01-29 PROCEDURE — A9585 GADOBUTROL INJECTION: HCPCS | Performed by: STUDENT IN AN ORGANIZED HEALTH CARE EDUCATION/TRAINING PROGRAM

## 2025-01-29 RX ORDER — GADOBUTROL 604.72 MG/ML
10 INJECTION INTRAVENOUS
Status: COMPLETED | OUTPATIENT
Start: 2025-01-29 | End: 2025-01-29

## 2025-01-29 RX ADMIN — GADOBUTROL 10 ML: 604.72 INJECTION INTRAVENOUS at 08:01

## 2025-02-03 ENCOUNTER — OFFICE VISIT (OUTPATIENT)
Dept: UROLOGY | Facility: CLINIC | Age: 55
End: 2025-02-03
Payer: COMMERCIAL

## 2025-02-03 VITALS
SYSTOLIC BLOOD PRESSURE: 149 MMHG | HEIGHT: 74 IN | WEIGHT: 273.13 LBS | DIASTOLIC BLOOD PRESSURE: 88 MMHG | BODY MASS INDEX: 35.05 KG/M2 | HEART RATE: 89 BPM

## 2025-02-03 DIAGNOSIS — R97.20 ELEVATED PSA: Primary | ICD-10-CM

## 2025-02-03 DIAGNOSIS — C61 PROSTATE CANCER: Primary | ICD-10-CM

## 2025-02-03 PROCEDURE — 3077F SYST BP >= 140 MM HG: CPT | Mod: CPTII,S$GLB,, | Performed by: STUDENT IN AN ORGANIZED HEALTH CARE EDUCATION/TRAINING PROGRAM

## 2025-02-03 PROCEDURE — 3008F BODY MASS INDEX DOCD: CPT | Mod: CPTII,S$GLB,, | Performed by: STUDENT IN AN ORGANIZED HEALTH CARE EDUCATION/TRAINING PROGRAM

## 2025-02-03 PROCEDURE — 99999 PR PBB SHADOW E&M-EST. PATIENT-LVL III: CPT | Mod: PBBFAC,,, | Performed by: STUDENT IN AN ORGANIZED HEALTH CARE EDUCATION/TRAINING PROGRAM

## 2025-02-03 PROCEDURE — 3079F DIAST BP 80-89 MM HG: CPT | Mod: CPTII,S$GLB,, | Performed by: STUDENT IN AN ORGANIZED HEALTH CARE EDUCATION/TRAINING PROGRAM

## 2025-02-03 PROCEDURE — 1159F MED LIST DOCD IN RCRD: CPT | Mod: CPTII,S$GLB,, | Performed by: STUDENT IN AN ORGANIZED HEALTH CARE EDUCATION/TRAINING PROGRAM

## 2025-02-03 PROCEDURE — 99214 OFFICE O/P EST MOD 30 MIN: CPT | Mod: S$GLB,,, | Performed by: STUDENT IN AN ORGANIZED HEALTH CARE EDUCATION/TRAINING PROGRAM

## 2025-02-03 RX ORDER — ENEMA 19; 7 G/133ML; G/133ML
1 ENEMA RECTAL ONCE
Qty: 1 ENEMA | Refills: 0 | Status: SHIPPED | OUTPATIENT
Start: 2025-02-03 | End: 2025-02-03

## 2025-02-03 NOTE — PROGRESS NOTES
[facility]  Urologic Oncology Clinic Note        Date of Service: 2/3/2025        Chief Complaint/Reason for Consultation: Elevated PSA    Requesting Provider:   No referring provider defined for this encounter.      History of Present Illness:   Patient 54 y.o. male presents with elevated PSA and abnormal prostate MRI. He was last seen by Dr. Bourgeois for elevated PSA of 5.0 on 11/12/2024. Repeat PSA 4.2 1/6/2025. MRI prostate showed PIRADS 3 lesion right apex anterior TZ.    Had colon cancer and had lap-sigmoidectomy and now on surveillance.      Blood thinners:  None    No Hx of TIA, MI, and CVA   Abdominal surgeries: Colon resection       Urologic History:     11/12/2024 -- PSA 5.0  1/6/2025 -- PSA 4.2  1/29/2025 -- MRI Prostate -- PV 52 cc, PSA 5, PSAD 0.1 -- DONA 1 1.1 cm PIRADS 3 right apex anterior TZ; no SVI, NVI, EPE    Patient Active Problem List    Diagnosis Date Noted    Acute rhinitis 03/18/2024    Hepatic cyst 03/18/2024    Class 2 obesity due to excess calories with body mass index (BMI) of 36.0 to 36.9 in adult 09/25/2023    Gout 09/25/2023    Normocytic anemia 03/20/2023    Pancreas cyst 03/20/2023    Thyroid nodule 03/20/2023    Anxiety 03/20/2023    Erectile dysfunction 03/20/2023    Benign prostatic hyperplasia 09/19/2022    Pneumonia due to COVID-19 virus 12/07/2020    BMI 34.0-34.9,adult 07/26/2018    Screening for colon cancer 08/10/2015    Hyperlipidemia 11/14/2014    Hypertension 11/12/2014    Foot pain: Dr. Simon 11/12/2014          Review of patient's allergies indicates:   Allergen Reactions    Lisinopril Other (See Comments)     cough        Past Medical History:   Diagnosis Date    COVID-19     12/2020    Hyperlipidemia 11/14/2014 11/13/2014:     Hypertension       Past Surgical History:   Procedure Laterality Date    COLON SURGERY      COLONOSCOPY  08/10/2015    COLONOSCOPY N/A 11/23/2021    Procedure: COLONOSCOPY;  Surgeon: GM Webber MD;  Location: [facility]  ENDO (4TH FLR);  Service: Endoscopy;  Laterality: N/A;  Covid test 11/23 Casey, instructions emailed to [email or web address]    COLONOSCOPY N/A 10/11/2022    Procedure: COLONOSCOPY;  Surgeon: GM Webber MD;  Location: [facility] ENDO (4TH FLR);  Service: Endoscopy;  Laterality: N/A;  Rapid, instr. via mail & portal, clears 4 hrs prior, am prep -PC    ENDOSCOPIC ULTRASOUND OF UPPER GASTROINTESTINAL TRACT N/A 5/30/2023    Procedure: ULTRASOUND, UPPER GI TRACT, ENDOSCOPIC;  Surgeon: Jet Garcia MD;  Location: [facility] ENDO (2ND FLR);  Service: Endoscopy;  Laterality: N/A;  Saw Dr. Bustamante in clinic, refused [place] and did not want to wait until after Dr. Bustamante's vacation to schedule [facility] him-DS  5/22/23-Instructions via portal-DS    FLEXIBLE SIGMOIDOSCOPY  01/13/2022    Procedure: SIGMOIDOSCOPY, FLEXIBLE;  Surgeon: GM Webber MD;  [facility]: [facility] OR 2ND FLR;  Service: Colon and Rectal;;    MOBILIZATION OF SPLENIC FLEXURE  01/13/2022    Procedure: MOBILIZATION, SPLENIC FLEXURE;  Surgeon: GM Webber MD;  [facility] 2ND FLR;  Service: Colon and Rectal;;    ROBOT-ASSISTED LOW ANTERIOR RESECTION OF COLON  01/13/2022    Procedure: ROBOTIC RESECTION, COLON, LOW ANTERIOR;  Surgeon: GM Webber MD;  [facility] 2ND FLR;  Service: Colon and Rectal;;    TONSILLECTOMY        Family History   Problem Relation Name Age of Onset    Dementia Mother      Cancer Maternal Grandfather      Coronary artery disease Maternal Grandfather      Heart attack Maternal Grandfather  55    Colon cancer Paternal Grandmother      Cancer Paternal Grandmother          colon cancer    Esophageal cancer Neg Hx        Social History     Tobacco Use    Smoking status: Never    Smokeless tobacco: Never   Substance Use Topics    Alcohol use: Yes     Alcohol/week: 3.0 standard drinks of alcohol     Types: 3 Drinks containing 0.5 oz of alcohol per week     Comment: 2-3 on weekends        Review of Systems   Constitutional:   "Negative for activity change.   HENT:  Negative for congestion.    Respiratory:  Negative for cough.    Cardiovascular:  Negative for chest pain.   Genitourinary:  Negative for hematuria.             OBJECTIVE:     Vitals:    02/03/25 1403   BP: (!) 149/88   BP Location: Right arm   Patient Position: Sitting   Pulse: 89   Weight: 123.9 kg (273 lb 2.4 oz)   Height: 6' 2" (1.88 m)          General Appearance: Alert, cooperative, no distress  Head: Normocephalic  Eyes: Clear conjunctiva  Neck: No obvious LND or JVD  Lungs: Normal chest excursion, no accessory muscle use  Chest: Regular rate rhythm by palpation, no pedal edema  Abdomen: Soft, non-tender, non-distended  Male Genitalia: Deferred   Extremities: Atraumatic   Lymph Nodes: No appreciable lymph adenopathy  Neurologic: No gross gait, motor or sensory deficits            LAB:      All laboratory values listed below was/were independently reviewed with patient at this clinic visit.    Lab Results   Component Value Date    PSA 5.0 (H) 11/12/2024    PSA 2.6 11/06/2023    PSA 3.1 11/02/2022    PSA 2.5 10/29/2021    PSA 1.8 11/12/2014    PSADIAG 4.2 (H) 01/06/2025         IMAGING:      All imaging listed below was/were independently reviewed with patient at this clinic visit.    1/29/2025  MRI Prostate    CLINICAL HISTORY:  Prostate cancer suspected;  Elevated prostate specific antigen (PSA)     TECHNIQUE:  Multiparametric MRI of the prostate/pelvis performed on a 3T scanner with phase pelvic coil. Multiplanar, multisequence images including high resolution, small field-of-view T2-WI; axial diffusion weighted images with multiple B-values and creation of ADC-maps; and dynamic contrast enhanced T1-weighted images through the prostate were obtained before, during, and after the administration of 10 cc intravenous gadolinium.     COMPARISON:  MRI 12/29/2022     FINDINGS:  Previous biopsy: None     PSA: 5.0 ng/mL 11/12/2024     Prior therapy: None     Prostate: 5.4 x 5.2 " x 4.4 cm corresponding to a computed volume of 52.15 cc.     Prostate density: 0.9     Peripheral zone: No focal abnormalities with imaging features concerning for prostate cancer, score 1.     Transitional zone:     Lesion (DONA) #T-1     Location: Side:right; Region: apex; Zone: anterior     Greatest dimension: 1.1 cm     T2-WI: Heterogeneous signal intensity withobscured margins, score 3.     DWI/ADC: Focal (discrete and different from the background) hypointense on ADC and/or focal hyperintense on high b-value DWI; may be markedly hypointense on ADC or markedly hyperintense on high b-value DWI, but not both, score 3.     DCE: Negative     Extraprostatic extension: Negative     PI-RADS assessment category: 3     Neurovascular bundle: Normal appearance.     Seminal vesicles: Normal appearance.     Adjacent Organ Involvement: No evidence for urinary bladder or rectal invasion.     Lymphadenopathy: None.     Other Findings: None.     Impression:     PI-RADS 3 lesion in the transitional zone at the apex.     Overall Assessment: PI-RADS 3 - Intermediate (the presence of clinically significant cancer is equivocal)     Number of targets created for potential MR/US fusion biopsy     Peripheral zone: 0     Transition zone: 1     Electronically signed by resident: Tina Swanson  Date:                                            01/29/2025  Time:                                           11:26     Electronically signed by:Tyler Knight  Date:                                            01/29/2025  Time:                                           13:43        ASSESSMENT/PLAN:     54 y.o.M with elevated PSA and PIRADS 3 lesion on prostate MRI.    Plan:    Elevated PSA  We discussed the nature of an elevated PSA or abnormal digital rectal examination when used for prostate cancer detection.  We discussed the pros and cons to screening along with the AUA guidelines and recommendations of other groups such as the [place]  Preventative Services Task Force and the [employer].  The various points of view were discussed.  I told him the only reliable way to determine if he has a occult prostate malignancy is to undergo a prostate needle biopsy.  He should consider having a biopsy only if he wishes to know if he may have a tumor and if he would want to pursue treatment.  The risks, benefits, possible complications and alternatives to prostate biopsy were discussed and his questions were answered.  The possible complications include but are not limited to bleeding (hematuria, per rectum, hematospermia, pelvic, etc), possibly requiring blood transfusion, infection/sepsis/death, hospitalization, reaction to local anesthetic, pain, urinary retention, diminished erections, failure to make a diagnosis or the need for further therapy, among others. I told him that a prostate biopsy is an elective procedure, and that he must be willing to undertake the risks of biopsy when he considers his life expectancy and competing medical conditions along with his goals of prostate cancer detection/treatment. He also understands that a prostate biopsy is most commonly performed in an outpatient office setting using local anesthesia but can be performed with heavy sedation or under anesthesia.      Schedule transperineal biopsy 2/21/2025      Tai Schwab MD  Urology PGY-3  [facility]        Attending Note:  I have personally seen and examined the patient and agree with the documentation and plan above as described by the resident.       Aftab Munoz MD  Urologic Oncology  P: [phone]

## 2025-02-03 NOTE — H&P (VIEW-ONLY)
[facility]  Urologic Oncology Clinic Note        Date of Service: 2/3/2025        Chief Complaint/Reason for Consultation: Elevated PSA    Requesting Provider:   No referring provider defined for this encounter.      History of Present Illness:   Patient 54 y.o. male presents with elevated PSA and abnormal prostate MRI. He was last seen by Dr. Bourgeois for elevated PSA of 5.0 on 11/12/2024. Repeat PSA 4.2 1/6/2025. MRI prostate showed PIRADS 3 lesion right apex anterior TZ.    Had colon cancer and had lap-sigmoidectomy and now on surveillance.      Blood thinners:  None    No Hx of TIA, MI, and CVA   Abdominal surgeries: Colon resection       Urologic History:     11/12/2024 -- PSA 5.0  1/6/2025 -- PSA 4.2  1/29/2025 -- MRI Prostate -- PV 52 cc, PSA 5, PSAD 0.1 -- DONA 1 1.1 cm PIRADS 3 right apex anterior TZ; no SVI, NVI, EPE    Patient Active Problem List    Diagnosis Date Noted    Acute rhinitis 03/18/2024    Hepatic cyst 03/18/2024    Class 2 obesity due to excess calories with body mass index (BMI) of 36.0 to 36.9 in adult 09/25/2023    Gout 09/25/2023    Normocytic anemia 03/20/2023    Pancreas cyst 03/20/2023    Thyroid nodule 03/20/2023    Anxiety 03/20/2023    Erectile dysfunction 03/20/2023    Benign prostatic hyperplasia 09/19/2022    Pneumonia due to COVID-19 virus 12/07/2020    BMI 34.0-34.9,adult 07/26/2018    Screening for colon cancer 08/10/2015    Hyperlipidemia 11/14/2014    Hypertension 11/12/2014    Foot pain: Dr. Simon 11/12/2014          Review of patient's allergies indicates:   Allergen Reactions    Lisinopril Other (See Comments)     cough        Past Medical History:   Diagnosis Date    COVID-19     12/2020    Hyperlipidemia 11/14/2014 11/13/2014:     Hypertension       Past Surgical History:   Procedure Laterality Date    COLON SURGERY      COLONOSCOPY  08/10/2015    COLONOSCOPY N/A 11/23/2021    Procedure: COLONOSCOPY;  Surgeon: GM Webber MD;  Location: [facility]  ENDO (4TH FLR);  Service: Endoscopy;  Laterality: N/A;  Covid test 11/23 Casey, instructions emailed to [email or web address]    COLONOSCOPY N/A 10/11/2022    Procedure: COLONOSCOPY;  Surgeon: GM Webber MD;  Location: [facility] ENDO (4TH FLR);  Service: Endoscopy;  Laterality: N/A;  Rapid, instr. via mail & portal, clears 4 hrs prior, am prep -PC    ENDOSCOPIC ULTRASOUND OF UPPER GASTROINTESTINAL TRACT N/A 5/30/2023    Procedure: ULTRASOUND, UPPER GI TRACT, ENDOSCOPIC;  Surgeon: Jet Garcia MD;  Location: [facility] ENDO (2ND FLR);  Service: Endoscopy;  Laterality: N/A;  Saw Dr. Bustamante in clinic, refused [place] and did not want to wait until after Dr. Bustamante's vacation to schedule [facility] him-DS  5/22/23-Instructions via portal-DS    FLEXIBLE SIGMOIDOSCOPY  01/13/2022    Procedure: SIGMOIDOSCOPY, FLEXIBLE;  Surgeon: GM Webber MD;  [facility] 2[facility] F[facility];  Service: Colon and Rectal;;    MOBILIZATION OF SPLENIC FLEXURE  01/13/2022    Procedure: MOBILIZATION, SPLENIC FLEXURE;  Surgeon: MG Webber MD;  [facility] F[facility];  Service: Colon and Rectal;;    ROBOT-ASSISTED LOW ANTERIOR RESECTION OF COLON  01/13/2022    Procedure: ROBOTIC RESECTION, COLON, LOW ANTERIOR;  Surgeon: GM Webber MD;  [facility] F[facility];  Service: Colon and Rectal;;    TONSILLECTOMY        Family History   Problem Relation Name Age of Onset    Dementia Mother      Cancer Maternal Grandfather      Coronary artery disease Maternal Grandfather      Heart attack Maternal Grandfather  55    Colon cancer Paternal Grandmother      Cancer Paternal Grandmother          colon cancer    Esophageal cancer Neg Hx        Social History     Tobacco Use    Smoking status: Never    Smokeless tobacco: Never   Substance Use Topics    Alcohol use: Yes     Alcohol/week: 3.0 standard drinks of alcohol     Types: 3 Drinks containing 0.5 oz of alcohol per week     Comment: 2-3 on weekends        Review of Systems   Constitutional:   "Negative for activity change.   HENT:  Negative for congestion.    Respiratory:  Negative for cough.    Cardiovascular:  Negative for chest pain.   Genitourinary:  Negative for hematuria.             OBJECTIVE:     Vitals:    02/03/25 1403   BP: (!) 149/88   BP Location: Right arm   Patient Position: Sitting   Pulse: 89   Weight: 123.9 kg (273 lb 2.4 oz)   Height: 6' 2" (1.88 m)          General Appearance: Alert, cooperative, no distress  Head: Normocephalic  Eyes: Clear conjunctiva  Neck: No obvious LND or JVD  Lungs: Normal chest excursion, no accessory muscle use  Chest: Regular rate rhythm by palpation, no pedal edema  Abdomen: Soft, non-tender, non-distended  Male Genitalia: Deferred   Extremities: Atraumatic   Lymph Nodes: No appreciable lymph adenopathy  Neurologic: No gross gait, motor or sensory deficits            LAB:      All laboratory values listed below was/were independently reviewed with patient at this clinic visit.    Lab Results   Component Value Date    PSA 5.0 (H) 11/12/2024    PSA 2.6 11/06/2023    PSA 3.1 11/02/2022    PSA 2.5 10/29/2021    PSA 1.8 11/12/2014    PSADIAG 4.2 (H) 01/06/2025         IMAGING:      All imaging listed below was/were independently reviewed with patient at this clinic visit.    1/29/2025  MRI Prostate    CLINICAL HISTORY:  Prostate cancer suspected;  Elevated prostate specific antigen (PSA)     TECHNIQUE:  Multiparametric MRI of the prostate/pelvis performed on a 3T scanner with phase pelvic coil. Multiplanar, multisequence images including high resolution, small field-of-view T2-WI; axial diffusion weighted images with multiple B-values and creation of ADC-maps; and dynamic contrast enhanced T1-weighted images through the prostate were obtained before, during, and after the administration of 10 cc intravenous gadolinium.     COMPARISON:  MRI 12/29/2022     FINDINGS:  Previous biopsy: None     PSA: 5.0 ng/mL 11/12/2024     Prior therapy: None     Prostate: 5.4 x 5.2 " x 4.4 cm corresponding to a computed volume of 52.15 cc.     Prostate density: 0.9     Peripheral zone: No focal abnormalities with imaging features concerning for prostate cancer, score 1.     Transitional zone:     Lesion (DONA) #T-1     Location: Side:right; Region: apex; Zone: anterior     Greatest dimension: 1.1 cm     T2-WI: Heterogeneous signal intensity withobscured margins, score 3.     DWI/ADC: Focal (discrete and different from the background) hypointense on ADC and/or focal hyperintense on high b-value DWI; may be markedly hypointense on ADC or markedly hyperintense on high b-value DWI, but not both, score 3.     DCE: Negative     Extraprostatic extension: Negative     PI-RADS assessment category: 3     Neurovascular bundle: Normal appearance.     Seminal vesicles: Normal appearance.     Adjacent Organ Involvement: No evidence for urinary bladder or rectal invasion.     Lymphadenopathy: None.     Other Findings: None.     Impression:     PI-RADS 3 lesion in the transitional zone at the apex.     Overall Assessment: PI-RADS 3 - Intermediate (the presence of clinically significant cancer is equivocal)     Number of targets created for potential MR/US fusion biopsy     Peripheral zone: 0     Transition zone: 1     Electronically signed by resident: Tina Swanson  Date:                                            01/29/2025  Time:                                           11:26     Electronically signed by:Tyler Knight  Date:                                            01/29/2025  Time:                                           13:43        ASSESSMENT/PLAN:     54 y.o.M with elevated PSA and PIRADS 3 lesion on prostate MRI.    Plan:    Elevated PSA  We discussed the nature of an elevated PSA or abnormal digital rectal examination when used for prostate cancer detection.  We discussed the pros and cons to screening along with the AUA guidelines and recommendations of other groups such as the [place]  Preventative Services Task Force and the American [employer]icians.  The various points of view were discussed.  I told him the only reliable way to determine if he has a occult prostate malignancy is to undergo a prostate needle biopsy.  He should consider having a biopsy only if he wishes to know if he may have a tumor and if he would want to pursue treatment.  The risks, benefits, possible complications and alternatives to prostate biopsy were discussed and his questions were answered.  The possible complications include but are not limited to bleeding (hematuria, per rectum, hematospermia, pelvic, etc), possibly requiring blood transfusion, infection/sepsis/death, hospitalization, reaction to local anesthetic, pain, urinary retention, diminished erections, failure to make a diagnosis or the need for further therapy, among others. I told him that a prostate biopsy is an elective procedure, and that he must be willing to undertake the risks of biopsy when he considers his life expectancy and competing medical conditions along with his goals of prostate cancer detection/treatment. He also understands that a prostate biopsy is most commonly performed in an outpatient office setting using local anesthesia but can be performed with heavy sedation or under anesthesia.      Schedule transperineal biopsy 2/21/2025      Tai Schwab MD  Urology PGY-3  [facility]        Attending Note:  I have personally seen and examined the patient and agree with the documentation and plan above as described by the resident.       Aftab Munoz MD  Urologic Oncology  P: [phone]

## 2025-02-04 ENCOUNTER — TELEPHONE (OUTPATIENT)
Dept: UROLOGY | Facility: CLINIC | Age: 55
End: 2025-02-04
Payer: COMMERCIAL

## 2025-02-04 ENCOUNTER — TELEPHONE (OUTPATIENT)
Dept: PREADMISSION TESTING | Facility: HOSPITAL | Age: 55
End: 2025-02-04
Payer: COMMERCIAL

## 2025-02-04 NOTE — ANESTHESIA PAT ROS NOTE
02/04/2025  Jim Brown is a 54 y.o., male.      Pre-op Assessment          Review of Systems  Anesthesia Hx:  No problems with previous Anesthesia   History of prior surgery of interest to airway management or planning:  Previous anesthesia: General 05/30/2023 Ultrasound Upper GI Tract Endoscopic (Abdomen) with general anesthesia.  Procedure performed at an [facility].       Denies Family Hx of Anesthesia complications.    Denies Personal Hx of Anesthesia complications.                    Social:  Non-Smoker, Social Alcohol Use       Hematology/Oncology:       -- Denies Anemia:                  Current/Recent Cancer.  Other (see Oncology comments)          Oncology Comments: Prostate CA     Colon CA     EENT/Dental:  EENT/Dental Normal           Cardiovascular:     Hypertension   Denies MI.        Denies Angina.     hyperlipidemia       Functional Capacity good / => 4 METS                         Pulmonary:       Denies Shortness of breath.   Denies Sleep Apnea.                Renal/:    BPH    Neoplasm/Tumor, Prostate Cancer.           Musculoskeletal:  Musculoskeletal Normal       Joint Disease:  Gout          Neurological:    Denies CVA.    Denies Seizures.                                Endocrine:    Thyroid Disease       Thyroid nodule     Obesity / BMI > 30  Dermatological:  Skin Normal    Psych:   anxiety                      Anesthesia Assessment: Preoperative EQUATION    Planned Procedure: Procedure(s) (LRB):  BIOPSY, PROSTATE, RECTAL APPROACH, WITH US GUIDANCE (N/A)  Requested Anesthesia Type:Monitor Anesthesia Care  Surgeon: Aftab Munoz MD  Service: Urology  Known or anticipated Date of Surgery:2/21/2025    Surgeon notes: reviewed    Electronic QUestionnaire Assessment completed via nurse interview with patient.        Triage considerations:     The patient has no apparent active  cardiac condition (No unstable coronary Syndrome such as severe unstable angina or recent [<1 month] myocardial infarction, decompensated CHF, severe valvular   disease or significant arrhythmia)    Previous anesthesia records:GETA and No problems ASA 2  05/30/2023 Ultrasound Upper GI Tract Endoscopic (Abdomen)  Airway:  Mallampati: III / II  Mouth Opening: Normal  TM Distance: Normal  Tongue: Normal  Neck ROM: Normal ROM    Last PCP note: 3-6 months ago , within [facility]   Subspecialty notes: Gastroenterology, Urology    Other important co-morbidities: HLD, HTN, Obesity, and h/o Colon Ca, BPH, Gout, Anxiety, ED , thyroid nodule,        Tests already available:  Available tests,  3-6 months ago , within [facility] .  11/12/2024 CMP, CBC, TSH, A1C              Instructions given. (See in Nurse's note)    Optimization:  Anesthesia Preop Clinic Assessment  Indicated phone screen     Medical Opinion Indicated       Sub-specialist consult indicated:   TBD       Plan:    Testing:  BMP, EKG, and Hematology Profile       Navigation: Tests Scheduled.                       Results will be tracked by Preop Clinic.

## 2025-02-12 ENCOUNTER — PATIENT MESSAGE (OUTPATIENT)
Dept: UROLOGY | Facility: CLINIC | Age: 55
End: 2025-02-12
Payer: COMMERCIAL

## 2025-02-13 ENCOUNTER — HOSPITAL ENCOUNTER (OUTPATIENT)
Dept: CARDIOLOGY | Facility: CLINIC | Age: 55
Discharge: HOME | End: 2025-02-13
Payer: COMMERCIAL

## 2025-02-13 ENCOUNTER — LAB VISIT (OUTPATIENT)
Dept: LAB | Facility: HOSPITAL | Age: 55
End: 2025-02-13
Attending: ANESTHESIOLOGY
Payer: COMMERCIAL

## 2025-02-13 DIAGNOSIS — Z01.818 PREOPERATIVE TESTING: ICD-10-CM

## 2025-02-13 LAB
ANION GAP SERPL CALC-SCNC: 7 MMOL/L (ref 8–16)
BUN SERPL-MCNC: 20 MG/DL (ref 6–20)
CALCIUM SERPL-MCNC: 9.6 MG/DL (ref 8.7–10.5)
CHLORIDE SERPL-SCNC: 105 MMOL/L (ref 95–110)
CO2 SERPL-SCNC: 28 MMOL/L (ref 23–29)
CREAT SERPL-MCNC: 1 MG/DL (ref 0.5–1.4)
ERYTHROCYTE [DISTWIDTH] IN BLOOD BY AUTOMATED COUNT: 11.6 % (ref 11.5–14.5)
EST. GFR  (NO RACE VARIABLE): >60 ML/MIN/1.73 M^2
GLUCOSE SERPL-MCNC: 105 MG/DL (ref 70–110)
HCT VFR BLD AUTO: 43.1 % (ref 40–54)
HGB BLD-MCNC: 14.3 G/DL (ref 14–18)
MCH RBC QN AUTO: 33.2 PG (ref 27–31)
MCHC RBC AUTO-ENTMCNC: 33.2 G/DL (ref 32–36)
MCV RBC AUTO: 100 FL (ref 82–98)
OHS QRS DURATION: 86 MS
OHS QTC CALCULATION: 407 MS
PLATELET # BLD AUTO: 130 K/UL (ref 150–450)
PMV BLD AUTO: 10.9 FL (ref 9.2–12.9)
POTASSIUM SERPL-SCNC: 4.4 MMOL/L (ref 3.5–5.1)
RBC # BLD AUTO: 4.31 M/UL (ref 4.6–6.2)
SODIUM SERPL-SCNC: 140 MMOL/L (ref 136–145)
WBC # BLD AUTO: 5.28 K/UL (ref 3.9–12.7)

## 2025-02-13 PROCEDURE — 85027 COMPLETE CBC AUTOMATED: CPT | Performed by: ANESTHESIOLOGY

## 2025-02-13 PROCEDURE — 80048 BASIC METABOLIC PNL TOTAL CA: CPT | Performed by: ANESTHESIOLOGY

## 2025-02-13 PROCEDURE — 93010 ELECTROCARDIOGRAM REPORT: CPT | Mod: S$GLB,,, | Performed by: INTERNAL MEDICINE

## 2025-02-13 PROCEDURE — 36415 COLL VENOUS BLD VENIPUNCTURE: CPT | Performed by: ANESTHESIOLOGY

## 2025-02-13 PROCEDURE — 93005 ELECTROCARDIOGRAM TRACING: CPT | Mod: S$GLB,,, | Performed by: ANESTHESIOLOGY

## 2025-02-19 ENCOUNTER — TELEPHONE (OUTPATIENT)
Dept: UROLOGY | Facility: CLINIC | Age: 55
End: 2025-02-19
Payer: COMMERCIAL

## 2025-02-20 ENCOUNTER — ANESTHESIA EVENT (OUTPATIENT)
Dept: SURGERY | Facility: HOSPITAL | Age: 55
End: 2025-02-20
Payer: COMMERCIAL

## 2025-02-21 ENCOUNTER — HOSPITAL ENCOUNTER (OUTPATIENT)
Facility: HOSPITAL | Age: 55
Discharge: HOME | End: 2025-02-21
Attending: STUDENT IN AN ORGANIZED HEALTH CARE EDUCATION/TRAINING PROGRAM | Admitting: STUDENT IN AN ORGANIZED HEALTH CARE EDUCATION/TRAINING PROGRAM
Payer: COMMERCIAL

## 2025-02-21 ENCOUNTER — ANESTHESIA (OUTPATIENT)
Dept: SURGERY | Facility: HOSPITAL | Age: 55
End: 2025-02-21
Payer: COMMERCIAL

## 2025-02-21 VITALS
DIASTOLIC BLOOD PRESSURE: 89 MMHG | HEIGHT: 74 IN | WEIGHT: 265 LBS | BODY MASS INDEX: 34.01 KG/M2 | HEART RATE: 62 BPM | SYSTOLIC BLOOD PRESSURE: 155 MMHG | OXYGEN SATURATION: 99 % | RESPIRATION RATE: 17 BRPM | TEMPERATURE: 98 F

## 2025-02-21 DIAGNOSIS — C61 PROSTATE CANCER: Primary | ICD-10-CM

## 2025-02-21 DIAGNOSIS — Z01.818 PREOPERATIVE TESTING: ICD-10-CM

## 2025-02-21 PROCEDURE — 25000003 PHARM REV CODE 250: Performed by: STUDENT IN AN ORGANIZED HEALTH CARE EDUCATION/TRAINING PROGRAM

## 2025-02-21 PROCEDURE — 55700 PR BIOPSY OF PROSTATE,NEEDLE/PUNCH: CPT | Mod: ,,, | Performed by: STUDENT IN AN ORGANIZED HEALTH CARE EDUCATION/TRAINING PROGRAM

## 2025-02-21 PROCEDURE — 88305 TISSUE EXAM BY PATHOLOGIST: CPT | Mod: 59 | Performed by: PATHOLOGY

## 2025-02-21 PROCEDURE — 71000016 HC POSTOP RECOV ADDL HR: Performed by: STUDENT IN AN ORGANIZED HEALTH CARE EDUCATION/TRAINING PROGRAM

## 2025-02-21 PROCEDURE — G0416 PROSTATE BIOPSY, ANY MTHD: HCPCS | Mod: 26,,, | Performed by: PATHOLOGY

## 2025-02-21 PROCEDURE — 63600175 PHARM REV CODE 636 W HCPCS: Performed by: STUDENT IN AN ORGANIZED HEALTH CARE EDUCATION/TRAINING PROGRAM

## 2025-02-21 PROCEDURE — 71000015 HC POSTOP RECOV 1ST HR: Performed by: STUDENT IN AN ORGANIZED HEALTH CARE EDUCATION/TRAINING PROGRAM

## 2025-02-21 PROCEDURE — 37000008 HC ANESTHESIA 1ST 15 MINUTES: Performed by: STUDENT IN AN ORGANIZED HEALTH CARE EDUCATION/TRAINING PROGRAM

## 2025-02-21 PROCEDURE — 76872 US TRANSRECTAL: CPT | Mod: 26,,, | Performed by: STUDENT IN AN ORGANIZED HEALTH CARE EDUCATION/TRAINING PROGRAM

## 2025-02-21 PROCEDURE — 37000009 HC ANESTHESIA EA ADD 15 MINS: Performed by: STUDENT IN AN ORGANIZED HEALTH CARE EDUCATION/TRAINING PROGRAM

## 2025-02-21 PROCEDURE — 88344 IMHCHEM/IMCYTCHM EA MLT ANTB: CPT | Mod: 26,,, | Performed by: PATHOLOGY

## 2025-02-21 PROCEDURE — 71000044 HC DOSC ROUTINE RECOVERY FIRST HOUR: Performed by: STUDENT IN AN ORGANIZED HEALTH CARE EDUCATION/TRAINING PROGRAM

## 2025-02-21 PROCEDURE — 88344 IMHCHEM/IMCYTCHM EA MLT ANTB: CPT | Performed by: PATHOLOGY

## 2025-02-21 PROCEDURE — 63600175 PHARM REV CODE 636 W HCPCS: Mod: JZ,TB | Performed by: STUDENT IN AN ORGANIZED HEALTH CARE EDUCATION/TRAINING PROGRAM

## 2025-02-21 PROCEDURE — 63600175 PHARM REV CODE 636 W HCPCS

## 2025-02-21 PROCEDURE — 25000003 PHARM REV CODE 250

## 2025-02-21 PROCEDURE — 25000003 PHARM REV CODE 250: Performed by: NURSE ANESTHETIST, CERTIFIED REGISTERED

## 2025-02-21 PROCEDURE — D9220A PRA ANESTHESIA: Mod: CRNA,,, | Performed by: NURSE ANESTHETIST, CERTIFIED REGISTERED

## 2025-02-21 PROCEDURE — 63600175 PHARM REV CODE 636 W HCPCS: Performed by: NURSE ANESTHETIST, CERTIFIED REGISTERED

## 2025-02-21 PROCEDURE — 36000705 HC OR TIME LEV I EA ADD 15 MIN: Performed by: STUDENT IN AN ORGANIZED HEALTH CARE EDUCATION/TRAINING PROGRAM

## 2025-02-21 PROCEDURE — D9220A PRA ANESTHESIA: Mod: ANES,,, | Performed by: STUDENT IN AN ORGANIZED HEALTH CARE EDUCATION/TRAINING PROGRAM

## 2025-02-21 PROCEDURE — 36000704 HC OR TIME LEV I 1ST 15 MIN: Performed by: STUDENT IN AN ORGANIZED HEALTH CARE EDUCATION/TRAINING PROGRAM

## 2025-02-21 RX ORDER — ONDANSETRON HYDROCHLORIDE 2 MG/ML
4 INJECTION, SOLUTION INTRAVENOUS DAILY PRN
Status: DISCONTINUED | OUTPATIENT
Start: 2025-02-21 | End: 2025-02-21 | Stop reason: HOSPADM

## 2025-02-21 RX ORDER — FENTANYL CITRATE 50 UG/ML
INJECTION, SOLUTION INTRAMUSCULAR; INTRAVENOUS
Status: DISCONTINUED | OUTPATIENT
Start: 2025-02-21 | End: 2025-02-21

## 2025-02-21 RX ORDER — TAMSULOSIN HYDROCHLORIDE 0.4 MG/1
0.4 CAPSULE ORAL ONCE
Status: COMPLETED | OUTPATIENT
Start: 2025-02-21 | End: 2025-02-21

## 2025-02-21 RX ORDER — MIDAZOLAM HYDROCHLORIDE 1 MG/ML
INJECTION, SOLUTION INTRAMUSCULAR; INTRAVENOUS
Status: DISCONTINUED | OUTPATIENT
Start: 2025-02-21 | End: 2025-02-21

## 2025-02-21 RX ORDER — CEFAZOLIN 2 G/1
2 INJECTION, POWDER, FOR SOLUTION INTRAMUSCULAR; INTRAVENOUS
Status: COMPLETED | OUTPATIENT
Start: 2025-02-21 | End: 2025-02-21

## 2025-02-21 RX ORDER — HYDROMORPHONE HYDROCHLORIDE 1 MG/ML
0.2 INJECTION, SOLUTION INTRAMUSCULAR; INTRAVENOUS; SUBCUTANEOUS EVERY 5 MIN PRN
Status: DISCONTINUED | OUTPATIENT
Start: 2025-02-21 | End: 2025-02-21 | Stop reason: HOSPADM

## 2025-02-21 RX ORDER — LIDOCAINE HYDROCHLORIDE 20 MG/ML
JELLY TOPICAL
Status: DISCONTINUED | OUTPATIENT
Start: 2025-02-21 | End: 2025-02-21 | Stop reason: HOSPADM

## 2025-02-21 RX ORDER — GLUCAGON 1 MG
1 KIT INJECTION
Status: DISCONTINUED | OUTPATIENT
Start: 2025-02-21 | End: 2025-02-21 | Stop reason: HOSPADM

## 2025-02-21 RX ORDER — LIDOCAINE HYDROCHLORIDE 20 MG/ML
INJECTION INTRAVENOUS
Status: DISCONTINUED | OUTPATIENT
Start: 2025-02-21 | End: 2025-02-21

## 2025-02-21 RX ORDER — PROPOFOL 10 MG/ML
VIAL (ML) INTRAVENOUS
Status: DISCONTINUED | OUTPATIENT
Start: 2025-02-21 | End: 2025-02-21

## 2025-02-21 RX ORDER — LIDOCAINE HYDROCHLORIDE 10 MG/ML
INJECTION, SOLUTION INFILTRATION; PERINEURAL
Status: DISCONTINUED | OUTPATIENT
Start: 2025-02-21 | End: 2025-02-21 | Stop reason: HOSPADM

## 2025-02-21 RX ADMIN — HYDROMORPHONE HYDROCHLORIDE 0.2 MG: 1 INJECTION, SOLUTION INTRAMUSCULAR; INTRAVENOUS; SUBCUTANEOUS at 10:02

## 2025-02-21 RX ADMIN — FENTANYL CITRATE 75 MCG: 50 INJECTION, SOLUTION INTRAMUSCULAR; INTRAVENOUS at 09:02

## 2025-02-21 RX ADMIN — LIDOCAINE HYDROCHLORIDE 60 MG: 20 INJECTION INTRAVENOUS at 08:02

## 2025-02-21 RX ADMIN — FENTANYL CITRATE 50 MCG: 50 INJECTION, SOLUTION INTRAMUSCULAR; INTRAVENOUS at 09:02

## 2025-02-21 RX ADMIN — TAMSULOSIN HYDROCHLORIDE 0.4 MG: 0.4 CAPSULE ORAL at 10:02

## 2025-02-21 RX ADMIN — CEFAZOLIN 2 G: 2 INJECTION, POWDER, FOR SOLUTION INTRAMUSCULAR; INTRAVENOUS at 08:02

## 2025-02-21 RX ADMIN — MIDAZOLAM HYDROCHLORIDE 2 MG: 2 INJECTION, SOLUTION INTRAMUSCULAR; INTRAVENOUS at 08:02

## 2025-02-21 RX ADMIN — FENTANYL CITRATE 25 MCG: 50 INJECTION, SOLUTION INTRAMUSCULAR; INTRAVENOUS at 08:02

## 2025-02-21 RX ADMIN — PROPOFOL 80 MG: 10 INJECTION, EMULSION INTRAVENOUS at 08:02

## 2025-02-21 RX ADMIN — SODIUM CHLORIDE: 0.9 INJECTION, SOLUTION INTRAVENOUS at 08:02

## 2025-02-21 RX ADMIN — PROPOFOL 150 MCG/KG/MIN: 10 INJECTION, EMULSION INTRAVENOUS at 08:02

## 2025-02-21 NOTE — PLAN OF CARE
Pt tolerated procedure well.  Discharge paperwork printed and reviewed with pt and family-copies of paperwork sent home with pt.  No complaints of pain or nausea.  Pt tolerating PO liquids well.  VSS.  Pt voided prior to discharge.   ml, reported to Dr. Adriane Nelson with urology.  Per MD sanders for discharge.  IV removed.  Safety maintained throughout care.

## 2025-02-21 NOTE — ANESTHESIA PREPROCEDURE EVALUATION
[facility] ANESTHESIOLOGY  Pre-operative Anesthetic Evaluation    Jim Brown is a 54 y.o. male here for Procedure(s) (LRB):  BIOPSY, PROSTATE, RECTAL APPROACH, WITH US GUIDANCE (N/A)    Htn  Hld  Obesity  Eleavted PSA  Preserved BiV fxn  Notable meds- xanax, arb  Prev anes uncomplicated  Prev airway benign  Appr NPO    METs> 4      PMHx:  Patient Active Problem List   Diagnosis    Hypertension    Foot pain: Dr. Simon    Hyperlipidemia    Screening for colon cancer    BMI 34.0-34.9,adult    Pneumonia due to COVID-19 virus    Benign prostatic hyperplasia    Normocytic anemia    Pancreas cyst    Thyroid nodule    Anxiety    Erectile dysfunction    Class 2 obesity due to excess calories with body mass index (BMI) of 36.0 to 36.9 in adult    Gout    Acute rhinitis    Hepatic cyst       Echocardiogram (if on file):  Results for orders placed during the hospital encounter of 02/04/21    Echo Color Flow Doppler? Yes    Interpretation Summary  · The left ventricle is normal in size with normal systolic function. The estimated ejection fraction is 63%  · Normal left ventricular diastolic function.  · Mild left atrial enlargement.  · Normal right ventricular size with normal right ventricular systolic function.  · Moderate right atrial enlargement.  · Normal central venous pressure (3 mmHg).  · The estimated PA systolic pressure is 30 mmHg.  · The ascending aorta is mildly dilated.      Allergies:  Review of patient's allergies indicates:   Allergen Reactions    Lisinopril Other (See Comments)     cough       Home Medications:  Current Outpatient Medications   Medication Instructions    albuterol (PROVENTIL HFA) 90 mcg/actuation inhaler 2 puffs, Inhalation, Every 6 hours PRN, Rescue    ALPRAZolam (XANAX) 0.25 mg, Oral, 3 times daily PRN    ascorbic acid (vitamin C) (VITAMIN C) 500 mg, Oral, 2 times daily    fluticasone propionate (FLONASE) 100 mcg, Each Nostril, Daily    losartan (COZAAR) 50 mg, Oral, Daily    meloxicam  (MOBIC) 15 mg, Daily PRN    multivitamin Tab 1 tablet, Oral, Daily    sildenafiL (VIAGRA) 100 mg, Oral, Daily PRN    sod sulf-pot chloride-mag sulf (SUTAB) 1.479-0.188- 0.225 gram tablet 12 tablets, Oral, Daily, Take according to package instructions with indicated amount of water.    tamsulosin (FLOMAX) 0.4 mg, Oral, Daily       Surgical Hx:  Past Surgical History:   Procedure Laterality Date    COLON SURGERY      COLONOSCOPY  08/10/2015    COLONOSCOPY N/A 11/23/2021    Procedure: COLONOSCOPY;  Surgeon: GM Webber MD;  Location: [facility] ENDO (4TH FLR);  Service: Endoscopy;  Laterality: N/A;  Covid test 11/23 Casey, yulisa emailed to [email or web address]    COLONOSCOPY N/A 10/11/2022    Procedure: COLONOSCOPY;  Surgeon: GM Webber MD;  Location: [facility] ENDO (4TH FLR);  Service: Endoscopy;  Laterality: N/A;  Rapid, instr. via mail & portal, clears 4 hrs prior, am prep -PC    ENDOSCOPIC ULTRASOUND OF UPPER GASTROINTESTINAL TRACT N/A 5/30/2023    Procedure: ULTRASOUND, UPPER GI TRACT, ENDOSCOPIC;  Surgeon: Jet Garcia MD;  Location: [facility] ENDO (2ND FLR);  Service: Endoscopy;  Laterality: N/A;  Saw Dr. Bustamante in clinic, refused Moon and did not want to wait until after Dr. Bustamante's vacation to schedule [facility] him-DS  5/22/23-Instructions via portal-DS    FLEXIBLE SIGMOIDOSCOPY  01/13/2022    Procedure: SIGMOIDOSCOPY, FLEXIBLE;  Surgeon: GM Webber MD;  Location: [facility] OR 2ND FLR;  Service: Colon and Rectal;;    MOBILIZATION OF SPLENIC FLEXURE  01/13/2022    Procedure: MOBILIZATION, SPLENIC FLEXURE;  Surgeon: GM Webber MD;  Location: [facility] OR 2ND FLR;  Service: Colon and Rectal;;    ROBOT-ASSISTED LOW ANTERIOR RESECTION OF COLON  01/13/2022    Procedure: ROBOTIC RESECTION, COLON, LOW ANTERIOR;  Surgeon: GM Webber MD;  Location: [facility] OR 2ND FLR;  Service: Colon and Rectal;;    TONSILLECTOMY         Tobacco/EtOH:  Social History     Tobacco Use    Smoking status: Never     "Smokeless tobacco: Never   Substance Use Topics    Alcohol use: Yes     Alcohol/week: 3.0 standard drinks of alcohol     Types: 3 Drinks containing 0.5 oz of alcohol per week     Comment: 2-3 on weekends       Labs:  CBC: No results for input(s): "WBC", "RBC", "HGB", "HCT", "PLT", "MCV", "MCH", "MCHC" in the last 72 hours.    CMP: No results for input(s): "NA", "K", "CL", "CO2", "BUN", "CREATININE", "GLU", "MG", "PHOS", "CALCIUM", "ALBUMIN", "PROT", "ALKPHOS", "ALT", "AST", "BILITOT" in the last 72 hours.    Coags: No results for input(s): "PT", "INR", "PROTIME", "APTT" in the last 72 hours.    EKG:  Results for orders placed or performed during the hospital encounter of 02/13/25   EKG 12-lead    Collection Time: 02/13/25  9:36 AM   Result Value Ref Range    QRS Duration 86 ms    [facility] QTC Calculation 407 ms    Narrative    Test Reason : Z01.818,    Vent. Rate :  67 BPM     Atrial Rate :  67 BPM     P-R Int : 122 ms          QRS Dur :  86 ms      QT Int : 386 ms       P-R-T Axes :  28  53  31 degrees    QTcB Int : 407 ms    Normal sinus rhythm  Normal ECG  When compared with ECG of 07-Dec-2020 12:10,  Vent. rate has decreased by  36 bpm    T wave inversion no longer evident in Inferior leads  Nonspecific T wave abnormality no longer evident in Lateral leads  Confirmed by Nikolai Porter ([id number]) on 2/13/2025 9:51:04 AM    Referred By: RHONDA LEOPOLD           Confirmed By: Nikolai Porter       Diagnostic Studies (pertinent only):        Pre-op Assessment    I have reviewed the Patient Summary Reports.     I have reviewed the Nursing Notes. I have reviewed the NPO Status.   I have reviewed the Medications.     Review of Systems  Anesthesia Hx:  No problems with previous Anesthesia   History of prior surgery of interest to airway management or planning:  Previous anesthesia: General 05/30/2023 Ultrasound Upper GI Tract Endoscopic (Abdomen) with general anesthesia.  Procedure performed at an [facility].       Denies " Family Hx of Anesthesia complications.    Denies Personal Hx of Anesthesia complications.                    Social:  Non-Smoker, Social Alcohol Use       Hematology/Oncology:       -- Denies Anemia:                  Current/Recent Cancer.  Other (see Oncology comments)          Oncology Comments: Prostate CA     Colon CA     EENT/Dental:  EENT/Dental Normal           Cardiovascular:     Hypertension   Denies MI.        Denies Angina.     hyperlipidemia       Functional Capacity good / => 4 METS                         Pulmonary:       Denies Shortness of breath.   Denies Sleep Apnea.                Renal/:    BPH    Neoplasm/Tumor, Prostate Cancer.           Musculoskeletal:  Musculoskeletal Normal       Joint Disease:  Gout          Neurological:    Denies CVA.    Denies Seizures.                                Endocrine:    Thyroid Disease       Thyroid nodule     Obesity / BMI > 30  Dermatological:  Skin Normal    Psych:   anxiety                 Physical Exam  General: Well nourished, Cooperative, Alert and Oriented    Airway:  Mallampati: II / II  Mouth Opening: Normal  TM Distance: Normal  Tongue: Normal  Neck ROM: Normal ROM    Dental:  Intact, Caps / Implants    Chest/Lungs:  Normal Respiratory Rate    Heart:  Rate: Normal        Anesthesia Plan  Type of Anesthesia, risks & benefits discussed:    Anesthesia Type: Gen ETT, Gen Supraglottic Airway, Gen Natural Airway, MAC  Intra-op Monitoring Plan: Standard ASA Monitors  Post Op Pain Control Plan: multimodal analgesia and IV/PO Opioids PRN  Induction:  IV  Airway Plan: Video  Informed Consent: Informed consent signed with the Patient and all parties understand the risks and agree with anesthesia plan.  All questions answered.   ASA Score: 2  Day of Surgery Review of History & Physical: H&P Update referred to the surgeon/provider.    Ready For Surgery From Anesthesia Perspective.     .

## 2025-02-21 NOTE — BRIEF OP NOTE
[facility] - [facility] (1st Fl)  Brief Operative Note    Surgery Date: 2/21/2025     Surgeons and Role:     * Aftab Munoz MD - Primary     * Adriane Nelson MD - Resident - Assisting        Pre-op Diagnosis:  Prostate cancer [C61]    Post-op Diagnosis:  Post-Op Diagnosis Codes:     * Prostate cancer [C61]    Procedure(s) (LRB):  BIOPSY, PROSTATE, RECTAL APPROACH, WITH US GUIDANCE (N/A)    Anesthesia: General/MAC    Operative Findings: procedures above performed without complication     Estimated Blood Loss: min          Specimens:   Specimen (24h ago, onward)       Start     Ordered    02/21/25 0919  Specimen to Pathology, Surgery Urology  Once        Comments: Pre-op Diagnosis: Prostate cancer [C61]Procedure(s):BIOPSY, PROSTATE, RECTAL APPROACH, WITH US GUIDANCE Number of specimens: 13Name of specimens: Transperineal Prostate Biopsies:1. Right Paramedian Apex2. Right Paramedian Base3. Right Posterior Apex4. Right Posterior Base5. Right Anterior Horn6. Right Transition Zone7. Left Paramedian Apex8. Left Paramedian Base9. Left Posterior Apex10. Left Posterior Base11. Left Anterior Horn12. Left Transition Zone 13. Right Anterior     References:    Click here for ordering Quick Tip   Question Answer Comment   Procedure Type: Urology    Release to patient Immediate        02/21/25 0919                      Discharge Note    OUTCOME: Patient tolerated treatment/procedure well without complication and is now ready for discharge.    DISPOSITION: Home or Self Care    FINAL DIAGNOSIS:  elevated PSA    FOLLOWUP: In clinic    DISCHARGE INSTRUCTIONS:    Discharge Procedure Orders   Basic Metabolic Panel   Standing Status: Future Number of Occurrences: 1 Standing Exp. Date: 05/05/26     Order Specific Question Answer Comments   Send normal result to authorizing provider's In Basket if patient is active on MyChart: Yes      CBC Without Differential   Standing Status: Future Number of Occurrences: 1 Standing Exp. Date: 05/05/26     Order  Specific Question Answer Comments   Send normal result to authorizing provider's In Basket if patient is active on MyChart: Yes      EKG 12-lead   Standing Status: Future Number of Occurrences: 1 Standing Exp. Date: 02/04/26

## 2025-02-21 NOTE — DISCHARGE INSTRUCTIONS
What to Expect After a Prostate Biopsy      You may have mild bleeding from the rectum or urine for about 1 week to 1 month, or in your ejaculate for several months. This bleeding is normal and expected, and it will stop. You may have mild discomfort in your rectal or urethral area for 24-48 hours.     You cannot do any strenuous lifting, straining, or exercising for 24 hours. You may return to full activity the day after the biopsy.     You may continue to take all your regular medications after the procedure except for the blood thinners for 72 hours.     You may resume all blood-thinning medications once you no longer see any bleeding or whenever your physician prescribing the medication says it is all right to do so. You may take Tylenol if you have a fever and your temperature is less than 100° F or if you have some discomfort.     You will receive a call from the Urology Department at [facility] with the results of your prostate biopsy typically within two weeks.     Signs and Symptoms to Report     Call your [facility] urologist at [phone] if you develop any of the following:  Temperature greater than 101°  F  Inability to urinate  A large amount of bleeding from the rectum or in the urine  Persistent or severe pain     After hours or on weekends, you may reach a urology resident on call at this number: [phone].

## 2025-02-21 NOTE — TRANSFER OF CARE
"Anesthesia Transfer of Care Note    Patient: Jim Brown    Procedure(s) Performed: Procedure(s) (LRB):  BIOPSY, PROSTATE, RECTAL APPROACH, WITH US GUIDANCE (N/A)    Patient location: PACU    Anesthesia Type: general    Transport from OR: Transported from OR on 6-10 L/min O2 by face mask with adequate spontaneous ventilation    Post pain: adequate analgesia    Post assessment: no apparent anesthetic complications    Post vital signs: stable    Level of consciousness: awake and alert    Nausea/Vomiting: no nausea/vomiting    Complications: none    Transfer of care protocol was followed      Last vitals: Visit Vitals  BP (!) 180/87 (BP Location: Left arm, Patient Position: Lying)   Pulse 64   Temp 36.7 °C (98.1 °F) (Oral)   Resp 18   Ht 6' 2" (1.88 m)   Wt 120.2 kg (265 lb)   SpO2 100%   BMI 34.02 kg/m²     "

## 2025-02-23 NOTE — ANESTHESIA POSTPROCEDURE EVALUATION
Anesthesia Post Evaluation    Patient: Jim Brown    Procedure(s) Performed: Procedure(s) (LRB):  BIOPSY, PROSTATE, RECTAL APPROACH, WITH US GUIDANCE (N/A)    Final Anesthesia Type: general      Patient location during evaluation: [facility]  Patient participation: Yes- Able to Participate  Level of consciousness: awake and alert and oriented  Post-procedure vital signs: reviewed and stable  Pain management: adequate  Airway patency: patent  BEBA mitigation strategies: Multimodal analgesia  PONV status at discharge: No PONV  Anesthetic complications: no      Cardiovascular status: blood pressure returned to baseline and hemodynamically stable  Respiratory status: unassisted, spontaneous ventilation and room air  Hydration status: euvolemic  Follow-up not needed.              Vitals Value Taken Time   /89 02/21/25 11:15   Temp 36.9 °C (98.4 °F) 02/21/25 09:40   Pulse 61 02/21/25 11:16   Resp 17 02/21/25 10:50   SpO2 100 % 02/21/25 11:16   Vitals shown include unfiled device data.      No case tracking events are documented in the log.      Pain/Traci Score: No data recorded

## 2025-02-26 LAB
FINAL PATHOLOGIC DIAGNOSIS: NORMAL
GROSS: NORMAL
Lab: NORMAL

## 2025-03-07 ENCOUNTER — OFFICE VISIT (OUTPATIENT)
Dept: UROLOGY | Facility: CLINIC | Age: 55
End: 2025-03-07
Payer: COMMERCIAL

## 2025-03-07 VITALS
HEART RATE: 74 BPM | WEIGHT: 271.63 LBS | HEIGHT: 74 IN | DIASTOLIC BLOOD PRESSURE: 78 MMHG | SYSTOLIC BLOOD PRESSURE: 131 MMHG | BODY MASS INDEX: 34.86 KG/M2

## 2025-03-07 DIAGNOSIS — C61 PROSTATE CANCER: Primary | ICD-10-CM

## 2025-03-07 PROCEDURE — 3075F SYST BP GE 130 - 139MM HG: CPT | Mod: CPTII,S$GLB,, | Performed by: STUDENT IN AN ORGANIZED HEALTH CARE EDUCATION/TRAINING PROGRAM

## 2025-03-07 PROCEDURE — 99215 OFFICE O/P EST HI 40 MIN: CPT | Mod: S$GLB,,, | Performed by: STUDENT IN AN ORGANIZED HEALTH CARE EDUCATION/TRAINING PROGRAM

## 2025-03-07 PROCEDURE — 3078F DIAST BP <80 MM HG: CPT | Mod: CPTII,S$GLB,, | Performed by: STUDENT IN AN ORGANIZED HEALTH CARE EDUCATION/TRAINING PROGRAM

## 2025-03-07 PROCEDURE — 1159F MED LIST DOCD IN RCRD: CPT | Mod: CPTII,S$GLB,, | Performed by: STUDENT IN AN ORGANIZED HEALTH CARE EDUCATION/TRAINING PROGRAM

## 2025-03-07 PROCEDURE — 3008F BODY MASS INDEX DOCD: CPT | Mod: CPTII,S$GLB,, | Performed by: STUDENT IN AN ORGANIZED HEALTH CARE EDUCATION/TRAINING PROGRAM

## 2025-03-07 PROCEDURE — 99999 PR PBB SHADOW E&M-EST. PATIENT-LVL III: CPT | Mod: PBBFAC,,, | Performed by: STUDENT IN AN ORGANIZED HEALTH CARE EDUCATION/TRAINING PROGRAM

## 2025-03-07 NOTE — PROGRESS NOTES
[facility]  Urologic Oncology Clinic Note        Date of Service: 3/7/2025        Chief Complaint/Reason for Consultation: Elevated PSA    Requesting Provider:   No referring provider defined for this encounter.      History of Present Illness:   Patient 55 y.o. male presents with elevated PSA and abnormal prostate MRI. He was last seen by Dr. Bourgeois for elevated PSA of 5.0 on 11/12/2024. Repeat PSA 4.2 1/6/2025. MRI prostate showed PIRADS 3 lesion right apex anterior TZ.    Had colon cancer and had lap-sigmoidectomy and now on surveillance.      Blood thinners:  None    No Hx of TIA, MI, and CVA   Abdominal surgeries: Colon resection       Urologic History:     11/12/2024 -- PSA 5.0  1/6/2025 -- PSA 4.2  1/29/2025 -- MRI Prostate -- PV 52 cc, PSA 5, PSAD 0.1 -- DONA 1 1.1 cm PIRADS 3 right apex anterior TZ; no SVI, NVI, EPE  2/21/2025 -- TP Prostate bx -- R paramedian apex 3+3, all other cores benign, target benign    Patient Active Problem List    Diagnosis Date Noted    Acute rhinitis 03/18/2024    Hepatic cyst 03/18/2024    Class 2 obesity due to excess calories with body mass index (BMI) of 36.0 to 36.9 in adult 09/25/2023    Gout 09/25/2023    Normocytic anemia 03/20/2023    Pancreas cyst 03/20/2023    Thyroid nodule 03/20/2023    Anxiety 03/20/2023    Erectile dysfunction 03/20/2023    Benign prostatic hyperplasia 09/19/2022    Pneumonia due to COVID-19 virus 12/07/2020    BMI 34.0-34.9,adult 07/26/2018    Screening for colon cancer 08/10/2015    Hyperlipidemia 11/14/2014    Hypertension 11/12/2014    Foot pain: Dr. Simon 11/12/2014          Review of patient's allergies indicates:   Allergen Reactions    Lisinopril Other (See Comments)     cough        Past Medical History:   Diagnosis Date    COVID-19     12/2020    Hyperlipidemia 11/14/2014 11/13/2014:     Hypertension       Past Surgical History:   Procedure Laterality Date    COLON SURGERY      COLONOSCOPY  08/10/2015     COLONOSCOPY N/A 11/23/2021    Procedure: COLONOSCOPY;  Surgeon: GM Webber MD;  Location: [facility] ENDO (4TH FLR);  Service: Endoscopy;  Laterality: N/A;  Covid test 11/23 Casey, yulisa emailed to [email or web address]    COLONOSCOPY N/A 10/11/2022    Procedure: COLONOSCOPY;  Surgeon: GM Webber MD;  Location: [facility] ENDO (4TH FLR);  Service: Endoscopy;  Laterality: N/A;  Rapid, instr. via mail & portal, clears 4 hrs prior, am prep -PC    ENDOSCOPIC ULTRASOUND OF UPPER GASTROINTESTINAL TRACT N/A 5/30/2023    Procedure: ULTRASOUND, UPPER GI TRACT, ENDOSCOPIC;  Surgeon: Jet Garcia MD;  Location: [facility] ENDO (2ND FLR);  Service: Endoscopy;  Laterality: N/A;  Saw Dr. Bustamante in clinic, refused Moon and did not want to wait until after Dr. Bustamante's vacation to schedule wt him-DS  5/22/23-Instructions via portal-DS    FLEXIBLE SIGMOIDOSCOPY  01/13/2022    Procedure: SIGMOIDOSCOPY, FLEXIBLE;  Surgeon: GM Webber MD;  Location: [facility] OR 2ND FLR;  Service: Colon and Rectal;;    MOBILIZATION OF SPLENIC FLEXURE  01/13/2022    Procedure: MOBILIZATION, SPLENIC FLEXURE;  Surgeon: GM Webber MD;  Location: [facility] OR 2ND FLR;  Service: Colon and Rectal;;    ROBOT-ASSISTED LOW ANTERIOR RESECTION OF COLON  01/13/2022    Procedure: ROBOTIC RESECTION, COLON, LOW ANTERIOR;  Surgeon: GM Webber MD;  Location: [facility] OR 2ND FLR;  Service: Colon and Rectal;;    TONSILLECTOMY      TRANSRECTAL BIOPSY OF PROSTATE WITH ULTRASOUND GUIDANCE N/A 2/21/2025    Procedure: BIOPSY, PROSTATE, RECTAL APPROACH, WITH US GUIDANCE;  Surgeon: Aftab Munoz MD;  Location: [facility] OR 1ST FLR;  Service: Urology;  Laterality: N/A;  transperineal      Family History   Problem Relation Name Age of Onset    Dementia Mother      Cancer Maternal Grandfather      Coronary artery disease Maternal Grandfather      Heart attack Maternal Grandfather  55    Colon cancer Paternal Grandmother      Cancer Paternal Grandmother           "colon cancer    Esophageal cancer Neg Hx        Social History     Tobacco Use    Smoking status: Never    Smokeless tobacco: Never   Substance Use Topics    Alcohol use: Yes     Alcohol/week: 3.0 standard drinks of alcohol     Types: 3 Drinks containing 0.5 oz of alcohol per week     Comment: 2-3 on weekends        Review of Systems   Constitutional:  Negative for activity change.   HENT:  Negative for congestion.    Respiratory:  Negative for cough.    Cardiovascular:  Negative for chest pain.   Genitourinary:  Negative for hematuria.             OBJECTIVE:     Vitals:    03/07/25 1115   BP: 131/78   BP Location: Right arm   Patient Position: Sitting   Pulse: 74   Weight: 123.2 kg (271 lb 9.7 oz)   Height: 6' 2" (1.88 m)          General Appearance: Alert, cooperative, no distress  Head: Normocephalic  Eyes: Clear conjunctiva  Neck: No obvious LND or JVD  Lungs: Normal chest excursion, no accessory muscle use  Chest: Regular rate rhythm by palpation, no pedal edema  Abdomen: Soft, non-tender, non-distended  Male Genitalia: Deferred   Extremities: Atraumatic   Lymph Nodes: No appreciable lymph adenopathy  Neurologic: No gross gait, motor or sensory deficits            LAB:      All laboratory values listed below was/were independently reviewed with patient at this clinic visit.    Lab Results   Component Value Date    PSA 5.0 (H) 11/12/2024    PSA 2.6 11/06/2023    PSA 3.1 11/02/2022    PSA 2.5 10/29/2021    PSA 1.8 11/12/2014    PSADIAG 4.2 (H) 01/06/2025         IMAGING:      All imaging listed below was/were independently reviewed with patient at this clinic visit.    1/29/2025  MRI Prostate    CLINICAL HISTORY:  Prostate cancer suspected;  Elevated prostate specific antigen (PSA)     TECHNIQUE:  Multiparametric MRI of the prostate/pelvis performed on a 3T scanner with phase pelvic coil. Multiplanar, multisequence images including high resolution, small field-of-view T2-WI; axial diffusion weighted images with " multiple B-values and creation of ADC-maps; and dynamic contrast enhanced T1-weighted images through the prostate were obtained before, during, and after the administration of 10 cc intravenous gadolinium.     COMPARISON:  MRI 12/29/2022     FINDINGS:  Previous biopsy: None     PSA: 5.0 ng/mL 11/12/2024     Prior therapy: None     Prostate: 5.4 x 5.2 x 4.4 cm corresponding to a computed volume of 52.15 cc.     Prostate density: 0.9     Peripheral zone: No focal abnormalities with imaging features concerning for prostate cancer, score 1.     Transitional zone:     Lesion (DONA) #T-1     Location: Side:right; Region: apex; Zone: anterior     Greatest dimension: 1.1 cm     T2-WI: Heterogeneous signal intensity withobscured margins, score 3.     DWI/ADC: Focal (discrete and different from the background) hypointense on ADC and/or focal hyperintense on high b-value DWI; may be markedly hypointense on ADC or markedly hyperintense on high b-value DWI, but not both, score 3.     DCE: Negative     Extraprostatic extension: Negative     PI-RADS assessment category: 3     Neurovascular bundle: Normal appearance.     Seminal vesicles: Normal appearance.     Adjacent Organ Involvement: No evidence for urinary bladder or rectal invasion.     Lymphadenopathy: None.     Other Findings: None.     Impression:     PI-RADS 3 lesion in the transitional zone at the apex.     Overall Assessment: PI-RADS 3 - Intermediate (the presence of clinically significant cancer is equivocal)     Number of targets created for potential MR/US fusion biopsy     Peripheral zone: 0     Transition zone: 1     Electronically signed by resident: Tina Swanson  Date:                                            01/29/2025  Time:                                           11:26     Electronically signed by:Tyler Knight  Date:                                            01/29/2025  Time:                                           13:43        ASSESSMENT/PLAN:      54 y.o.M with elevated PSA and PIRADS 3 lesion on prostate MRI.    Plan:    Prostate cancer, very low risk  Today we reviewed the incidence, risk factors, and natural history of prostate cancer and Holden 3+3 disease which the patient was diagnosed with previously.     After we reviewed the treatment options for prostate cancer which include but are not limited to surgery, radiation, cryo, HIFU, hormones and minimally invasive options [laser, focal therapy etc], we spoke of active surveillance (AS) as a management strategy for his NCCN very low risk prostate cancer. I pointed out the differences between AS and watchful waiting (WW) whereby the former approach utilizes periodic reassessment with the possibility of later entering active treatment when cure is still possible compared to the latter which does not assess for cancer progression but will initiate palliative therapy if the patient becomes symptomatic.  We reviewed some of the symptoms of advanced disease including weight loss, bone pain, worsened LUTS etc.   I told the patient that by electing AS or no treatment, and because this is prostate cancer, there is always the chance for cancer progression and loss of the window of curability. This is a risk that the patient must be willing to accept.  Active surveillance has not been prospectively validated in clinical trials in intermediate-risk disease, but it may be considered in favorable intermediate-risk disease.  However, for men with intermediate risk disease, active surveillance may come with a higher risk of developing metastases compared to definitive treatment. Other drawbacks of AS include the need for continual monitoring with PSA, clinic visits, occasional prostate biopsies and their attendant risks/costs and possibly imaging studies.  For some patients on a surveillance strategy, there may be a psychological burden to obviating active treatment of prostate cancer.     We outlined various  methods to monitor patients on AS, which at a minimum includes every 6-12 month PSAs, DREs and visits.  Initially I recommend a confirmatory test within 6 months of diagnosis with repeat biopsy or mpMRI, though I favor biopsy. If our confirmatory test reveals the same grade and volume of cancer then we will continue on with AS with surveillance using a low or high-intensity plan. Then we talk about some reasons to transition to active treatment such as (1) a greater volume of cancer detected at biopsy, (2) higher grade cancer at biopsy, (3) patient concern or preference.      Finally I told the patient that even though we might think the patient is a candidate for AS, understaging and undergrading does occur, and knowledge of whether the patient is actually a good candidate is incomplete.  I could not make any guarantee that the patient was making the right decision or that his cancer may later be cured if the patient eventually opted for treatment. The patient expressed understanding of these issues.     Plan to see back in 6 months w PSA  Will determine timing of confirmatory biopsy then.  Discussed with patient that recent data suggests that confirmatory biopsy may not be mandatory or moved to deferred date in the event that no PIRADS 4,5 lesions are seen on MRI and no visible disease is biopsy proven as cancer.     RTC 6 months w/ PSA.  Will refer to genetics for evaluation due to strong family hx of multiple cancers and history of colon and now prostate cancer.        Aftab Munoz MD  Urologic Oncology  P: [phone]

## 2025-03-12 ENCOUNTER — PATIENT MESSAGE (OUTPATIENT)
Dept: UROLOGY | Facility: CLINIC | Age: 55
End: 2025-03-12
Payer: COMMERCIAL

## 2025-03-31 ENCOUNTER — PATIENT MESSAGE (OUTPATIENT)
Dept: UROLOGY | Facility: CLINIC | Age: 55
End: 2025-03-31
Payer: COMMERCIAL

## 2025-03-31 DIAGNOSIS — C61 PROSTATE CANCER: Primary | ICD-10-CM

## 2025-04-11 ENCOUNTER — PATIENT MESSAGE (OUTPATIENT)
Dept: HEMATOLOGY/ONCOLOGY | Facility: CLINIC | Age: 55
End: 2025-04-11
Payer: COMMERCIAL

## 2025-04-15 ENCOUNTER — TELEPHONE (OUTPATIENT)
Dept: HEMATOLOGY/ONCOLOGY | Facility: CLINIC | Age: 55
End: 2025-04-15
Payer: COMMERCIAL

## 2025-04-15 DIAGNOSIS — I10 HYPERTENSION, UNSPECIFIED TYPE: ICD-10-CM

## 2025-04-15 RX ORDER — LOSARTAN POTASSIUM 50 MG/1
50 TABLET ORAL DAILY
Qty: 90 TABLET | Refills: 1 | Status: SHIPPED | OUTPATIENT
Start: 2025-04-15

## 2025-04-15 NOTE — TELEPHONE ENCOUNTER
Called and spoke to patient to confirm the in office genetic visit tomorrow he wanted to reschedule then changed his mind and told to complete the genetic questionnaire.

## 2025-04-15 NOTE — PROGRESS NOTES
"Cancer Genetics  Department of Hematology and Oncology  [facility]    Date of Service:  25  Visit Provider:  Rajesh Agarwal DNP  Collaborating Physician:  Antonietta Han MD    Patient ID  Name: Jim Brown    : 1970    MRN: [id number]      Referring Provider  Aftab Munoz MD  [address] [place] [address]    Visit Information  Approximately 19 minutes were spent face-to-face with the patient.    Total time:  Approximately 30 minutes.  This includes face to face time and non-face to face time preparing to see the patient (eg, review of tests), obtaining and/or reviewing separately obtained history, documenting clinical information in the electronic or other health record, independently interpreting results and communicating results to the patient/family/caregiver, or care coordinator.      SUBJECTIVE      Chief Complaint: Genetic Evaluation    History of Present Illness (HPI):  Jim Brown ("Jim"), 55 y.o., assigned male sex at birth, is new to the [facility] and to me.  He was referred by Dr. Aftab Munoz for cancer-genetic risk assessment given his diagnosis of prostate cancer.    Oncology Pedigree  If this pedigree appears small/illegible on your screen, expand this note window horizontally.    Other than noted, no known family history of cancer, pancreatitis, benign tumor/mass/lesion, or colon polyps.    Personal Cancer-Related History  Colon cancer, dx.age 51  2021 age 51 colonoscopy with a >50-mm polyp removed in piecemeal fashion from sigmoid colon, with pathology indicating adenocarcinoma arising in a tubulovillous adenoma with high-grade dysplasia  2022 low anterior resection of sigmoid colon and rectum, proximal donut resection, and distal donut resection  No other treatment  Prostatic acinar adenocarcinoma, dx.age [age]  2025 biopsy with pathology " "indicating Sadie 3+3  No treatment thus far  Colon polyp:  Yes    08/10/2015 age 45 colonoscopy:  (1) pedunculated 15-mm polyp removed from rectum, with pathology indicating nonneoplastic polyp with surface ulceration and hyperplastic change  11/23/2021 age 51 colonoscopy:  (1) tubulovillous adenoma (led to colorectal cancer diagnosis)  10/11/2022 age 52 colonoscopy:  (2) semi-sessile 2- to 4-mm polyps removed from distal transverse colon, with pathology indicating "Fragments of tubular adenoma(s)"  Other tumor or pertinent mass/lesion:  Yes  "Small right thyroid lobe nodule, unchanged from CT 12/10/2021" reported on 02/01/2023 CT CAP - TI-RADS 2 nodule per 04/03/2023 US report  Pancreatitis:  No    Genealogy  Ashkenazi [place] ancestry:  No  Consanguinity:  No  Personal history of genetic testing:  No  Family history of genetic testing:  No    Other Pertinent Medical History  Blood disorder:  No    Focused Surgical History  Colonoscopies:    08/10/2015 age 45  11/23/2021 age 51  10/11/2022 age 52    Focused Social History  Never smoker    Review of Systems  See HPI.    Patient's Distress Score today was 5/10 (scale of 0-10 on which 10=worst).  Patient attributes this to his cancer diagnosis and mother in home and patient helping to get her affairs in order.  Patient denies experiencing thoughts of harming self or others.  Offered patient a referral to Behavioral Health, and patient declined.      OBJECTIVE     Patient Active Problem List    Diagnosis Date Noted    Acute rhinitis 03/18/2024    Hepatic cyst 03/18/2024    Class 2 obesity due to excess calories with body mass index (BMI) of 36.0 to 36.9 in adult 09/25/2023    Gout 09/25/2023    Normocytic anemia 03/20/2023    Pancreas cyst (appears this may have been hepatic cyst) 03/20/2023    Thyroid nodule 03/20/2023    Anxiety 03/20/2023    Erectile dysfunction 03/20/2023    Benign prostatic hyperplasia 09/19/2022    Pneumonia due to COVID-19 virus 12/07/2020    " "BMI 34.0-34.9,adult 07/26/2018    Screening for colon cancer 08/10/2015    Hyperlipidemia 11/14/2014    Hypertension 11/12/2014    Foot pain: Dr. Simon 11/12/2014     Past Medical History:   Diagnosis Date    Colon polyps     COVID-19     12/2020    Hyperlipidemia 11/14/2014 11/13/2014:     Hypertension     Prostatic adenocarcinoma 02/2025     Physical Exam  Very pleasant patient.  Unaccompanied.  Vitals signs:  Reviewed:  Vitals:    04/16/25 1036   PainSc: 0-No pain     (Pain is rated on scale of 0-10 on which 10=worst.)  Constitutional      Appearance:  Appears well developed and well nourished. No distress.   Pulmonary     Effort:  Normal.  Neurological     Mental Status:  Alert and oriented.     Coordination:  Normal.   Psychiatric         Mood and Affect:  Normal.     Thought Content:  Normal.     Speech:  Normal.     Behavior:  Normal.     Judgment:  Normal.    ASSESSMENT / PLAN          ICD-10-CM ICD-9-CM   1. Encounter for nonprocreative genetic counseling  Z71.83 V26.33   2. Prostate cancer  C61 185   3. History of colon cancer  Z85.038 V10.05   4. Family history of colon cancer  Z80.0 V16.0   5. Family history of kidney cancer  Z80.51 V16.51   6. Thyroid nodule  E04.1 241.0     1. Encounter for nonprocreative genetic counseling  Jim Brown ("Jim"), 55 y.o., assigned male sex at birth, is new to the [facility] and to me.  He was referred by Dr. Aftab Munoz for cancer-genetic risk assessment given his diagnosis of prostate cancer.    From a clinical standpoint, based on and related to Jim's history of two primary cancers (as well as his paternal family history of cancer), I recommend germline cancer-genetic testing for him.    Cancer Genetics  Germline cancer-genetic testing is the testing of genes associated with cancer, known as cancer susceptibility genes.  Just as these genes are inherited from the parents--one copy of each gene from each " parent--mutations in these genes can be inherited, as well.  A mutation in a cancer susceptibility gene adversely affects the gene's ability to prevent cancer; therefore, carriers of cancer susceptibility gene mutations may be at increased risk for certain cancers.     Causes of Cancer  Only approximately 5%-10% of cancers are caused by an inherited cancer susceptibility gene mutation; rather, the majority of cancers are sporadic.  Causes of sporadic cancer may include environmental risk factors, lifestyle risk factors, and non-modifiable risk factors.  Family members often share not only genetics but also other risk factors, including environmental and lifestyle risk factors, so cancers can be familial.     Potential Results of Genetic Testing, and Their Implications  Potential results of genetic testing include positive, negative, and variant of unknown significance (VUS).    Positive:  A positive result indicates the presence of at least one clinically significant gene mutation, and the individual's associated cancer risks vary depending upon the cancer susceptibility gene(s) in which there is/are a mutation(s).  With a positive result, depending upon the specific result and the individual's clinical history, modified risk management may be recommended, including measures for risk reduction and/or surveillance; however, there are not always effective strategies for risk management.  There may be reproductive implications of a positive genetic test result, and there may be cancer-treatment implications of a positive genetic test result.  Negative:  A negative result indicates that no clinically significant mutations were identified in the gene(s) tested.  A negative result does not indicate that that individual cannot develop cancer, and, in fact, that individual may even be at increased risk for cancer based on shared risk factors with affected relatives.  The ability to interpret the meaning of a negative  genetic testing result is limited in an individual unaffected with cancer whose affected relatives have not first undergone such testing.  The most informative family member to undergo testing for hereditary cancer syndromes first are those who have personally had cancer.    VUS:  A VUS is a genetic change for which there is not presently enough data for the laboratory to make a determination as to whether the genetic variant is clinically significant.  VUSs are not typically acted upon clinically.       Genetic Mutation Inheritance  When an individual tests positive for a gene mutation, their first-degree relatives each have a 50% chance of carrying the same mutation, and other, more distant blood relatives may also be at risk of carrying the same mutation.      Genetic Discrimination  Genetic discrimination occurs when an individual is discriminated against on the basis of their genetic information.  The Genetic Information Nondiscrimination Act of 2008 (CECILIA) is [place] federal legislation that provides some protections against the use of an individual's genetic information by their health insurer and by their employer.  Title I of CECILIA prohibits most health insurers from utilizing an individual's genetic information to make decisions regarding insurance eligibility or premium charges; this protection does not apply to health insurance obtained through a job with the [employer], and it may not apply to health insurance obtained through the Federal Employees Health Benefits Plan.  Title II of CECILIA prohibits covered entities, in many cases, from requesting or requiring the genetic information of employees and applicants and from using said information to make employment decisions; this protection does not apply to employers with fewer than 15 employees or to the .  CECILIA does not protect individuals from genetic discrimination by any other type of policy or entity, including but not limited to life insurance,  disability insurance, long-term care insurance,  benefits, and [facility] benefits.    Genetic Testing Logistics  An outside laboratory would perform the testing after a blood sample is collected at [facility].  The test is expected to take approximately three weeks to result.  The patient may incur out-of-pocket costs related to genetic testing.  Post-test genetic counseling can be conducted once the genetic testing results are available.     Genetic Testing Plan  Offered Jim options of proceeding with hereditary cancer susceptibility gene testing at this time versus delaying/declining such.  Jim elects to defer genetic testing for hereditary cancer syndromes at this time and was advised to notify me if he desires to proceed in the future; he has my contact information.     2. Prostate cancer  - Ambulatory referral/consult to Genetics:  Completed  - See #1  - Jim is to continue surveillance and management as recommended by his prostate oncologist(s)    3. History of colon cancer  - See #1  - Jim is to continue surveillance and management as recommended by his colorectal oncologist(s)    4. Family history of colon cancer  - See #1    5. Family history of kidney cancer  - See #1         Questions were encouraged and answered to the patient's satisfaction, and he verbalized understanding of the information and agreement with the plan.  Advised Jim that pertinent information will be accessible in this visit note for his review.    INFORMATION TO FOLLOW YOUR CANCER-GENETICS VISIT    Health Maintenance / Cancer Risks and Risk Management    Only a small percentage (approximately 5%-10%) of cancers are hereditary, meaning caused by an inherited gene mutation; rather, most cancers are sporadic.  Environmental factors, lifestyle factors, and even factors beyond our control play a significant role in the development of many cancers.  Even if an individual has negative genetic testing, they can still  "be at increased risk for certain cancers, as they may independently have risk factors for those cancers and/or may share risk factors with family members affected by cancer.  It is strongly recommended that you ensure that your healthcare providers are aware of and up-to-date as to your personal and family history so that your cancer screenings can be based in part off of this information.      With regard to cancer risk reduction in general, it is recommended to avoid tobacco use; be physically active; maintain a healthy weight; eat a diet rich in fruits, vegetables, and whole grains and low in saturated/trans fat, red meat, and processed meat; limit alcohol consumption (zero is best); protect against sexually transmitted infections; protect against the sun, and avoid tanning beds; and get regular screenings for cancers as recommended by your healthcare team.    The below is general guidance regarding cancer screenings and, as such, does not incorporate factors specific to you and is not to be considered exhaustive.  The terms "female" and "male" below refer to assigned sex at birth.  If you believe you need to see any of the below specialists, notify your cancer-genetics specialist promptly.     Cancer in general:  Attend an annual visit with a primary care provider (PCP) for history review, physical exam, and age-appropriate testing.  Additionally, beyond those cancer screenings listed herein, undergo screenings/surveillance as recommended by your healthcare providers.      Skin cancer:  Undergo total-body skin examination (TBSE) with a dermatology provider at least annually.    Colorectal cancer:  Ask your PCP, gastroenterology (GI) provider, or cancer-genetics specialist whether you are at average risk versus increased risk of colorectal cancer.  Based on your risk category, corresponding guideline recommendations, and, if applicable, results of your prior colorectal cancer screening, undergo screenings (which " "may consist of colonoscopy or other test) for colorectal cancer as recommended by your healthcare provider.  With regard to family history, advise your GI provider or cancer-genetics specialist if any of the following applies, as such could impact colorectal cancer screening recommendations for you:  (a) Any blood relative of yours has been diagnosed with colorectal cancer; (b) a 1st-degree relative of yours has a colorectal polyp history including any of the following characteristics:  adenoma with high-grade dysplasia, adenoma or sessile serrated polyp/adenoma 1 cm or larger in size, villous or tubulovillous adenoma, traditional serrated adenoma (TSA), sessile serrated lesion/polyp/adenoma with any dysplasia, cumulatively 10 or more polyps.    Female breast cancer:  If you have never had breast cancer, there are tools that can be used to "calculate" your risk of breast cancer; ask your gynecology provider, breast healthcare specialist, or cancer-genetic specialist to calculate your breast cancer risks at least annually if you are age 25 or older.  If you are at average risk of breast cancer and age 25 or older, undergo breast cancer risk-reduction counseling at least annually, undergo clinical breast examination (CBE) annually, and practice breast awareness, meaning being familiar with your breasts and promptly reporting any changes/concerns to your healthcare provider; starting at age 40, annual 3-D screening mammogram is recommended (shared decision-making is encouraged based on individuals' values and preferences).  If you are at intermediate or increased/high risk of breast cancer, underwent radiation therapy with exposure to your breast tissue, and/or have heterogeneously or extremely dense breast tissue on your most recent mammogram, be under the care of a breast healthcare specialist for age- and risk-appropriate breast cancer risk-reduction counseling, CBE, and screening imaging, including mammography and " potentially other modalities.  If you have a personal diagnosis/history of breast cancer, continue breast cancer surveillance as recommended by your breast oncologist.      Gynecologic cancers (females):  Attend an annual visit with your gynecology provider, which may include pelvic exam and/or Pap smear/HPV testing.    Prostate cancer (males):  Ask your PCP, urology provider, or cancer-genetics specialist whether you are at average risk versus high risk of prostate cancer.  Begin prostate cancer screenings at age 45 if you are at average risk or at age 40 if you are at high risk.  Continue prostate cancer screenings as recommended by your healthcare provider based on the results of your prior prostate cancer screening.  Prostate cancer screening includes prostate-specific antigen (PSA) testing with or without digital rectal exam (YOVANY).  If you are age 75 or older, talk to your healthcare provider about when you should stop undergoing prostate cancer screenings.    Pancreatic cancer:  If you have at least two blood relatives (on the same side of the family) who have had pancreatic cancer, you may be a candidate for pancreatic cancer screening, which should be performed under the care of a pancreas specialist; notify your cancer-genetics specialist if this family history applies to you.    Lung cancer:  If you have a 20-pack-year smoking history or greater and are at least 50 years of age, shared patient/provider decision-making regarding low-dose CT scan (LDCT) for lung cancer screening is recommended; if you have at least a 20-pack-year smoking history or are unsure, discuss with your healthcare provider whether lung cancer screening is right for you.    Regarding Your Relatives    Your relatives also may be at increased risk for certain cancers, depending upon their own personal and family history; therefore, it is important that they, too, ensure that their own healthcare providers are aware of and up-to-date as  to their personal and family history so that their medical management, including cancer screenings, can be based in part off of this information.      Additionally, it is possible for your relatives to have hereditary cancer susceptibility gene mutations in addition to and/or other than those identified in you (or if/when you have negative genetic testing).    Your relatives should speak with a healthcare provider about their cancer risks and whether genetic counseling and potentially testing may be indicated for them.  Anyone interested in pursuing cancer genetic counseling/testing may contact the [facility] at [phone] to schedule an appointment or may visit [email or web address] to locate a cancer-genetic specialist near them.    Follow-up    Please continue to follow up with all healthcare providers as directed.    Moving forward, please update me with any changes to--or new information learned about--your personal or family history and with any relative's genetic testing results.  If at any point you wish to pursue hereditary cancer genetic testing, please reach out so I can facilitate that for you; otherwise, please follow up with me in 1 year to revisit the matter.  In the meantime, please don't hesitate to reach out with any questions or concerns, or follow up anytime sooner than planned as you wish.      Rajesh Soria, DNP, APRN, FNP-BC, AOCNP, CGRA  Nurse Practitioner, Cancer Genetics  Department of Hematology and Oncology  [facility]        CC:  Dr. Aftab Munoz         Routed to Cancer Genetics Staff:  - Please place recall for 1-year follow-up with me.        Portions of the record may have been created with voice-recognition software.  Occasional wrong-word or sound-a-like substitutions may have occurred due to the inherent limitations of voice-recognition software.  Read the chart carefully, and  recognize, using context, where substitutions have occurred.

## 2025-04-15 NOTE — TELEPHONE ENCOUNTER
No care due was identified.  [facility] Embedded Care Due Messages. Reference number: [id number].   4/15/2025 1:53:13 PM CDT

## 2025-04-16 ENCOUNTER — OFFICE VISIT (OUTPATIENT)
Dept: HEMATOLOGY/ONCOLOGY | Facility: CLINIC | Age: 55
End: 2025-04-16
Payer: COMMERCIAL

## 2025-04-16 DIAGNOSIS — Z80.51 FAMILY HISTORY OF KIDNEY CANCER: ICD-10-CM

## 2025-04-16 DIAGNOSIS — Z71.83 ENCOUNTER FOR NONPROCREATIVE GENETIC COUNSELING: Primary | ICD-10-CM

## 2025-04-16 DIAGNOSIS — Z80.0 FAMILY HISTORY OF COLON CANCER: ICD-10-CM

## 2025-04-16 DIAGNOSIS — C61 PROSTATE CANCER: ICD-10-CM

## 2025-04-16 DIAGNOSIS — Z85.038 HISTORY OF COLON CANCER: ICD-10-CM

## 2025-04-16 PROCEDURE — 99999 PR PBB SHADOW E&M-EST. PATIENT-LVL III: CPT | Mod: PBBFAC,,, | Performed by: NURSE PRACTITIONER

## 2025-06-10 DIAGNOSIS — F41.9 ANXIETY: ICD-10-CM

## 2025-06-10 RX ORDER — ALPRAZOLAM 0.25 MG/1
0.25 TABLET ORAL 3 TIMES DAILY PRN
Qty: 60 TABLET | Refills: 0 | Status: SHIPPED | OUTPATIENT
Start: 2025-06-10 | End: 2025-07-10

## 2025-06-10 NOTE — TELEPHONE ENCOUNTER
No care due was identified.  [facility] Embedded Care Due Messages. Reference number: [id number].   6/10/2025 1:41:03 PM CDT

## 2025-07-15 ENCOUNTER — PATIENT MESSAGE (OUTPATIENT)
Dept: SURGERY | Facility: CLINIC | Age: 55
End: 2025-07-15
Payer: COMMERCIAL

## 2025-07-15 ENCOUNTER — PATIENT MESSAGE (OUTPATIENT)
Dept: PRIMARY CARE CLINIC | Facility: CLINIC | Age: 55
End: 2025-07-15
Payer: COMMERCIAL

## 2025-07-15 DIAGNOSIS — E04.1 THYROID NODULE: Primary | ICD-10-CM

## 2025-07-15 NOTE — TELEPHONE ENCOUNTER
I sent pt a message - (Please help him sched his thyroid U/S)   I'm so sorry to hear that.  I hope you are doing well. Yes, I will place the order  Dr. VALE

## 2025-07-25 ENCOUNTER — PATIENT MESSAGE (OUTPATIENT)
Dept: SURGERY | Facility: CLINIC | Age: 55
End: 2025-07-25
Payer: COMMERCIAL

## 2025-08-12 ENCOUNTER — TELEPHONE (OUTPATIENT)
Dept: UROLOGY | Facility: CLINIC | Age: 55
End: 2025-08-12
Payer: COMMERCIAL

## 2025-08-12 ENCOUNTER — PATIENT MESSAGE (OUTPATIENT)
Dept: UROLOGY | Facility: CLINIC | Age: 55
End: 2025-08-12
Payer: COMMERCIAL

## 2025-08-27 ENCOUNTER — TELEPHONE (OUTPATIENT)
Dept: SURGERY | Facility: CLINIC | Age: 55
End: 2025-08-27
Payer: COMMERCIAL

## 2025-08-28 PROBLEM — Z12.11 COLON CANCER SCREENING: Status: ACTIVE | Noted: 2025-08-28

## (undated) DEVICE — CLIPPER BLADE MOD 4406 (CAREF)

## (undated) DEVICE — STAPLER ECHELON PWR CIR 29MM

## (undated) DEVICE — SUT ETHIBOND EXCEL 0 MO6 18

## (undated) DEVICE — CLIP HEMO-LOK MLX LARGE LF

## (undated) DEVICE — DRAPE SCOPE PILLOW WARMER

## (undated) DEVICE — PORT AIRSEAL 12/120MM LPI

## (undated) DEVICE — SUT MCRYL PLUS 4-0 PS2 27IN

## (undated) DEVICE — TIP YANKAUERS BULB NO VENT

## (undated) DEVICE — SOL ELECTROLUBE ANTI-STIC

## (undated) DEVICE — SET TRI-LUMEN FILTERED TUBE

## (undated) DEVICE — ELECTRODE REM PLYHSV RETURN 9

## (undated) DEVICE — SOL CLEARIFY VISUALIZATION LAP

## (undated) DEVICE — IRRIGATOR ENDOSCOPY DISP.

## (undated) DEVICE — LEGGINGS 48X31 INCH

## (undated) DEVICE — RELOAD ECHELON FLEX GRN 60MM

## (undated) DEVICE — STAPLER ECHELON FLEX 60MM 44CM

## (undated) DEVICE — PAD PINK TRENDELENBURG POS XL

## (undated) DEVICE — ADHESIVE DERMABOND ADVANCED

## (undated) DEVICE — DRAPE ARM DAVINCI XI

## (undated) DEVICE — OBTURATOR BLADELESS 8MM XI

## (undated) DEVICE — SEALER VESSEL EXTEND

## (undated) DEVICE — SEAL UNIVERSAL 5MM-8MM XI

## (undated) DEVICE — TROCAR ENDOPATH XCEL 5MM 15CM

## (undated) DEVICE — DRAPE ABDOMINAL TIBURON 14X11

## (undated) DEVICE — Device

## (undated) DEVICE — NDL MAXCORE BIOPSY 18G 25CM

## (undated) DEVICE — BLADE SURG CARBON STEEL SZ11

## (undated) DEVICE — KIT GELPORT LAPAROSCOPIC ABD

## (undated) DEVICE — SEE MEDLINE ITEM 156902

## (undated) DEVICE — SEE MEDLINE ITEM 157181

## (undated) DEVICE — NDL 20GX1-1/2IN IB

## (undated) DEVICE — COVER LIGHT HANDLE

## (undated) DEVICE — NDL INSUF ULTRA VERESS 120MM

## (undated) DEVICE — TROCAR ENDOPATH XCEL 11MM 10CM

## (undated) DEVICE — CONTAINER FORMALIN PREFILLED

## (undated) DEVICE — STAPLER CONTOUR 40MM GREEN

## (undated) DEVICE — COVER TRANSDUCER LATEX N/STERI

## (undated) DEVICE — SYR 10CC LUER LOCK

## (undated) DEVICE — TROCAR ENDOPATH XCEL 5MM 7.5CM

## (undated) DEVICE — TRAY FOLEY 16FR INFECTION CONT

## (undated) DEVICE — TROCAR ENDOPATH XCEL 12MM 10CM

## (undated) DEVICE — SEALER LIGASURE MARYLAND 37CM

## (undated) DEVICE — SYR 30CC LUER LOCK

## (undated) DEVICE — SUT 0 54IN COATED VICRYL U

## (undated) DEVICE — SYR SALINE FLSH PRFL STRL 10ML

## (undated) DEVICE — CANNULA ENDOPATH XCEL 5X100MM

## (undated) DEVICE — DRAPE COLUMN DAVINCI XI